# Patient Record
Sex: FEMALE | Race: WHITE | Employment: OTHER | URBAN - NONMETROPOLITAN AREA
[De-identification: names, ages, dates, MRNs, and addresses within clinical notes are randomized per-mention and may not be internally consistent; named-entity substitution may affect disease eponyms.]

---

## 2019-12-17 ENCOUNTER — OFFICE VISIT (OUTPATIENT)
Dept: FAMILY MEDICINE CLINIC | Age: 69
End: 2019-12-17
Payer: MEDICARE

## 2019-12-17 VITALS
SYSTOLIC BLOOD PRESSURE: 110 MMHG | DIASTOLIC BLOOD PRESSURE: 66 MMHG | HEART RATE: 62 BPM | WEIGHT: 159 LBS | RESPIRATION RATE: 12 BRPM | HEIGHT: 64 IN | TEMPERATURE: 98 F | BODY MASS INDEX: 27.14 KG/M2

## 2019-12-17 DIAGNOSIS — Z12.11 COLON CANCER SCREENING: ICD-10-CM

## 2019-12-17 DIAGNOSIS — E78.00 PURE HYPERCHOLESTEROLEMIA: ICD-10-CM

## 2019-12-17 DIAGNOSIS — Z12.31 ENCOUNTER FOR SCREENING MAMMOGRAM FOR MALIGNANT NEOPLASM OF BREAST: ICD-10-CM

## 2019-12-17 DIAGNOSIS — I69.30 LATE EFFECT OF CEREBROVASCULAR ACCIDENT (CVA): Primary | ICD-10-CM

## 2019-12-17 PROCEDURE — 99202 OFFICE O/P NEW SF 15 MIN: CPT | Performed by: FAMILY MEDICINE

## 2019-12-17 RX ORDER — VITAMIN B COMPLEX
100 TABLET ORAL DAILY
COMMUNITY

## 2019-12-17 RX ORDER — ECHINACEA 400 MG
1000 CAPSULE ORAL DAILY
COMMUNITY

## 2019-12-17 RX ORDER — NEOMYCIN/POLYMYXIN B/PRAMOXINE 3.5-10K-1
500 CREAM (GRAM) TOPICAL DAILY
COMMUNITY
End: 2020-02-19

## 2019-12-17 ASSESSMENT — PATIENT HEALTH QUESTIONNAIRE - PHQ9
2. FEELING DOWN, DEPRESSED OR HOPELESS: 0
SUM OF ALL RESPONSES TO PHQ QUESTIONS 1-9: 0
SUM OF ALL RESPONSES TO PHQ QUESTIONS 1-9: 0
SUM OF ALL RESPONSES TO PHQ9 QUESTIONS 1 & 2: 0
1. LITTLE INTEREST OR PLEASURE IN DOING THINGS: 0

## 2020-02-17 ENCOUNTER — TELEPHONE (OUTPATIENT)
Dept: FAMILY MEDICINE CLINIC | Age: 70
End: 2020-02-17

## 2020-02-19 ENCOUNTER — APPOINTMENT (OUTPATIENT)
Dept: CT IMAGING | Age: 70
DRG: 065 | End: 2020-02-19
Payer: MEDICARE

## 2020-02-19 ENCOUNTER — HOSPITAL ENCOUNTER (INPATIENT)
Age: 70
LOS: 5 days | Discharge: HOME HEALTH | DRG: 065 | End: 2020-02-24
Attending: EMERGENCY MEDICINE | Admitting: INTERNAL MEDICINE
Payer: MEDICARE

## 2020-02-19 ENCOUNTER — APPOINTMENT (OUTPATIENT)
Dept: GENERAL RADIOLOGY | Age: 70
DRG: 065 | End: 2020-02-19
Payer: MEDICARE

## 2020-02-19 ENCOUNTER — APPOINTMENT (OUTPATIENT)
Dept: MRI IMAGING | Age: 70
DRG: 065 | End: 2020-02-19
Payer: MEDICARE

## 2020-02-19 PROBLEM — I10 ESSENTIAL HYPERTENSION: Status: ACTIVE | Noted: 2020-02-19

## 2020-02-19 PROBLEM — R47.01 APHASIA: Status: ACTIVE | Noted: 2020-02-19

## 2020-02-19 LAB
ALBUMIN SERPL-MCNC: 4.2 G/DL (ref 3.5–5.1)
ALP BLD-CCNC: 72 U/L (ref 38–126)
ALT SERPL-CCNC: 8 U/L (ref 11–66)
ANION GAP SERPL CALCULATED.3IONS-SCNC: 16 MEQ/L (ref 8–16)
APTT: 38.7 SECONDS (ref 22–38)
AST SERPL-CCNC: 14 U/L (ref 5–40)
AVERAGE GLUCOSE: 90 MG/DL (ref 70–126)
BASOPHILS # BLD: 0.7 %
BASOPHILS ABSOLUTE: 0 THOU/MM3 (ref 0–0.1)
BILIRUB SERPL-MCNC: 0.5 MG/DL (ref 0.3–1.2)
BILIRUBIN URINE: NEGATIVE
BLOOD, URINE: NEGATIVE
BUN BLDV-MCNC: 11 MG/DL (ref 7–22)
CALCIUM SERPL-MCNC: 9.1 MG/DL (ref 8.5–10.5)
CHARACTER, URINE: CLEAR
CHLORIDE BLD-SCNC: 105 MEQ/L (ref 98–111)
CHOLESTEROL, TOTAL: 179 MG/DL (ref 100–199)
CO2: 23 MEQ/L (ref 23–33)
COLOR: YELLOW
CREAT SERPL-MCNC: 0.9 MG/DL (ref 0.4–1.2)
EKG ATRIAL RATE: 51 BPM
EKG P AXIS: 74 DEGREES
EKG P-R INTERVAL: 172 MS
EKG Q-T INTERVAL: 432 MS
EKG QRS DURATION: 94 MS
EKG QTC CALCULATION (BAZETT): 398 MS
EKG R AXIS: -16 DEGREES
EKG T AXIS: -5 DEGREES
EKG VENTRICULAR RATE: 51 BPM
EOSINOPHIL # BLD: 0.5 %
EOSINOPHILS ABSOLUTE: 0 THOU/MM3 (ref 0–0.4)
ERYTHROCYTE [DISTWIDTH] IN BLOOD BY AUTOMATED COUNT: 14.8 % (ref 11.5–14.5)
ERYTHROCYTE [DISTWIDTH] IN BLOOD BY AUTOMATED COUNT: 44.8 FL (ref 35–45)
ETHYL ALCOHOL, SERUM: < 0.01 %
FOLATE: 13.9 NG/ML (ref 4.8–24.2)
GFR SERPL CREATININE-BSD FRML MDRD: 62 ML/MIN/1.73M2
GLUCOSE BLD-MCNC: 100 MG/DL (ref 70–108)
GLUCOSE BLD-MCNC: 88 MG/DL (ref 70–108)
GLUCOSE, URINE: NEGATIVE MG/DL
HBA1C MFR BLD: 5 % (ref 4.4–6.4)
HCT VFR BLD CALC: 38.8 % (ref 37–47)
HDLC SERPL-MCNC: 44 MG/DL
HEMOGLOBIN: 12.6 GM/DL (ref 12–16)
IMMATURE GRANS (ABS): 0.01 THOU/MM3 (ref 0–0.07)
IMMATURE GRANULOCYTES: 0.2 %
INR BLD: 1.09 (ref 0.85–1.13)
KETONES, URINE: NEGATIVE
LDL CHOLESTEROL CALCULATED: 105 MG/DL
LEUKOCYTES, UA: ABNORMAL
LYMPHOCYTES # BLD: 21.9 %
LYMPHOCYTES ABSOLUTE: 1 THOU/MM3 (ref 1–4.8)
MCH RBC QN AUTO: 27.3 PG (ref 26–33)
MCHC RBC AUTO-ENTMCNC: 32.5 GM/DL (ref 32.2–35.5)
MCV RBC AUTO: 84.2 FL (ref 81–99)
MONOCYTES # BLD: 7.2 %
MONOCYTES ABSOLUTE: 0.3 THOU/MM3 (ref 0.4–1.3)
NITRATE, UA: NEGATIVE
NUCLEATED RED BLOOD CELLS: 0 /100 WBC
OSMOLALITY CALCULATION: 286.3 MOSMOL/KG (ref 275–300)
PH UA: 5 (ref 5–9)
PLATELET # BLD: 137 THOU/MM3 (ref 130–400)
PMV BLD AUTO: 11.4 FL (ref 9.4–12.4)
POTASSIUM REFLEX MAGNESIUM: 4.1 MEQ/L (ref 3.5–5.2)
PROTEIN UA: NEGATIVE MG/DL
RBC # BLD: 4.61 MILL/MM3 (ref 4.2–5.4)
REFLEX TO URINE C & S: ABNORMAL
SEG NEUTROPHILS: 69.5 %
SEGMENTED NEUTROPHILS ABSOLUTE COUNT: 3.1 THOU/MM3 (ref 1.8–7.7)
SODIUM BLD-SCNC: 144 MEQ/L (ref 135–145)
SPECIFIC GRAVITY UA: 1.01 (ref 1–1.03)
T4 FREE: 1.11 NG/DL (ref 0.93–1.76)
TOTAL PROTEIN: 6.9 G/DL (ref 6.1–8)
TRIGL SERPL-MCNC: 149 MG/DL (ref 0–199)
TROPONIN T: < 0.01 NG/ML
TSH SERPL DL<=0.05 MIU/L-ACNC: 6.66 UIU/ML (ref 0.4–4.2)
UROBILINOGEN, URINE: 0.2 EU/DL (ref 0–1)
VITAMIN B-12: 180 PG/ML (ref 211–911)
WBC # BLD: 4.4 THOU/MM3 (ref 4.8–10.8)

## 2020-02-19 PROCEDURE — 83036 HEMOGLOBIN GLYCOSYLATED A1C: CPT

## 2020-02-19 PROCEDURE — 99223 1ST HOSP IP/OBS HIGH 75: CPT | Performed by: INTERNAL MEDICINE

## 2020-02-19 PROCEDURE — 2580000003 HC RX 258: Performed by: INTERNAL MEDICINE

## 2020-02-19 PROCEDURE — 99205 OFFICE O/P NEW HI 60 MIN: CPT

## 2020-02-19 PROCEDURE — 70553 MRI BRAIN STEM W/O & W/DYE: CPT

## 2020-02-19 PROCEDURE — 84439 ASSAY OF FREE THYROXINE: CPT

## 2020-02-19 PROCEDURE — A9579 GAD-BASE MR CONTRAST NOS,1ML: HCPCS | Performed by: INTERNAL MEDICINE

## 2020-02-19 PROCEDURE — 70450 CT HEAD/BRAIN W/O DYE: CPT

## 2020-02-19 PROCEDURE — 84443 ASSAY THYROID STIM HORMONE: CPT

## 2020-02-19 PROCEDURE — 81003 URINALYSIS AUTO W/O SCOPE: CPT

## 2020-02-19 PROCEDURE — 36415 COLL VENOUS BLD VENIPUNCTURE: CPT

## 2020-02-19 PROCEDURE — 99284 EMERGENCY DEPT VISIT MOD MDM: CPT

## 2020-02-19 PROCEDURE — 80053 COMPREHEN METABOLIC PANEL: CPT

## 2020-02-19 PROCEDURE — 82746 ASSAY OF FOLIC ACID SERUM: CPT

## 2020-02-19 PROCEDURE — G0480 DRUG TEST DEF 1-7 CLASSES: HCPCS

## 2020-02-19 PROCEDURE — 2709999900 HC NON-CHARGEABLE SUPPLY

## 2020-02-19 PROCEDURE — 85610 PROTHROMBIN TIME: CPT

## 2020-02-19 PROCEDURE — 82948 REAGENT STRIP/BLOOD GLUCOSE: CPT

## 2020-02-19 PROCEDURE — 84484 ASSAY OF TROPONIN QUANT: CPT

## 2020-02-19 PROCEDURE — 82607 VITAMIN B-12: CPT

## 2020-02-19 PROCEDURE — 99222 1ST HOSP IP/OBS MODERATE 55: CPT | Performed by: PSYCHIATRY & NEUROLOGY

## 2020-02-19 PROCEDURE — 6370000000 HC RX 637 (ALT 250 FOR IP): Performed by: INTERNAL MEDICINE

## 2020-02-19 PROCEDURE — 2580000003 HC RX 258: Performed by: EMERGENCY MEDICINE

## 2020-02-19 PROCEDURE — 2060000000 HC ICU INTERMEDIATE R&B

## 2020-02-19 PROCEDURE — 85025 COMPLETE CBC W/AUTO DIFF WBC: CPT

## 2020-02-19 PROCEDURE — 6370000000 HC RX 637 (ALT 250 FOR IP): Performed by: EMERGENCY MEDICINE

## 2020-02-19 PROCEDURE — 85730 THROMBOPLASTIN TIME PARTIAL: CPT

## 2020-02-19 PROCEDURE — 80061 LIPID PANEL: CPT

## 2020-02-19 PROCEDURE — 6360000004 HC RX CONTRAST MEDICATION: Performed by: INTERNAL MEDICINE

## 2020-02-19 PROCEDURE — 93005 ELECTROCARDIOGRAM TRACING: CPT | Performed by: EMERGENCY MEDICINE

## 2020-02-19 PROCEDURE — 70496 CT ANGIOGRAPHY HEAD: CPT

## 2020-02-19 PROCEDURE — 70498 CT ANGIOGRAPHY NECK: CPT

## 2020-02-19 PROCEDURE — 71045 X-RAY EXAM CHEST 1 VIEW: CPT

## 2020-02-19 RX ORDER — ACETAMINOPHEN 325 MG/1
650 TABLET ORAL EVERY 6 HOURS PRN
Status: DISCONTINUED | OUTPATIENT
Start: 2020-02-19 | End: 2020-02-24 | Stop reason: HOSPADM

## 2020-02-19 RX ORDER — CLOPIDOGREL BISULFATE 75 MG/1
75 TABLET ORAL DAILY
Status: DISCONTINUED | OUTPATIENT
Start: 2020-02-19 | End: 2020-02-24 | Stop reason: HOSPADM

## 2020-02-19 RX ORDER — ASPIRIN 81 MG/1
81 TABLET, CHEWABLE ORAL DAILY
Status: DISCONTINUED | OUTPATIENT
Start: 2020-02-19 | End: 2020-02-24 | Stop reason: HOSPADM

## 2020-02-19 RX ORDER — ATORVASTATIN CALCIUM 40 MG/1
40 TABLET, FILM COATED ORAL NIGHTLY
Status: DISCONTINUED | OUTPATIENT
Start: 2020-02-19 | End: 2020-02-24 | Stop reason: HOSPADM

## 2020-02-19 RX ORDER — ASPIRIN 81 MG/1
324 TABLET, CHEWABLE ORAL ONCE
Status: COMPLETED | OUTPATIENT
Start: 2020-02-19 | End: 2020-02-19

## 2020-02-19 RX ORDER — ACETAMINOPHEN 650 MG/1
650 SUPPOSITORY RECTAL EVERY 6 HOURS PRN
Status: DISCONTINUED | OUTPATIENT
Start: 2020-02-19 | End: 2020-02-24 | Stop reason: HOSPADM

## 2020-02-19 RX ORDER — SODIUM CHLORIDE 0.9 % (FLUSH) 0.9 %
10 SYRINGE (ML) INJECTION PRN
Status: DISCONTINUED | OUTPATIENT
Start: 2020-02-19 | End: 2020-02-24 | Stop reason: HOSPADM

## 2020-02-19 RX ORDER — DEXTROSE AND SODIUM CHLORIDE 5; .9 G/100ML; G/100ML
INJECTION, SOLUTION INTRAVENOUS CONTINUOUS
Status: DISCONTINUED | OUTPATIENT
Start: 2020-02-19 | End: 2020-02-19

## 2020-02-19 RX ORDER — SODIUM CHLORIDE 9 MG/ML
1000 INJECTION, SOLUTION INTRAVENOUS CONTINUOUS
Status: DISCONTINUED | OUTPATIENT
Start: 2020-02-19 | End: 2020-02-19

## 2020-02-19 RX ORDER — AMLODIPINE BESYLATE 5 MG/1
5 TABLET ORAL DAILY
Status: DISCONTINUED | OUTPATIENT
Start: 2020-02-19 | End: 2020-02-21

## 2020-02-19 RX ORDER — SODIUM CHLORIDE 0.9 % (FLUSH) 0.9 %
10 SYRINGE (ML) INJECTION EVERY 12 HOURS SCHEDULED
Status: DISCONTINUED | OUTPATIENT
Start: 2020-02-19 | End: 2020-02-24 | Stop reason: HOSPADM

## 2020-02-19 RX ORDER — ONDANSETRON 2 MG/ML
4 INJECTION INTRAMUSCULAR; INTRAVENOUS EVERY 6 HOURS PRN
Status: DISCONTINUED | OUTPATIENT
Start: 2020-02-19 | End: 2020-02-24 | Stop reason: HOSPADM

## 2020-02-19 RX ORDER — POLYETHYLENE GLYCOL 3350 17 G/17G
17 POWDER, FOR SOLUTION ORAL DAILY PRN
Status: DISCONTINUED | OUTPATIENT
Start: 2020-02-19 | End: 2020-02-24 | Stop reason: HOSPADM

## 2020-02-19 RX ORDER — PROMETHAZINE HYDROCHLORIDE 25 MG/1
12.5 TABLET ORAL EVERY 6 HOURS PRN
Status: DISCONTINUED | OUTPATIENT
Start: 2020-02-19 | End: 2020-02-24 | Stop reason: HOSPADM

## 2020-02-19 RX ADMIN — IOPAMIDOL 80 ML: 755 INJECTION, SOLUTION INTRAVENOUS at 17:33

## 2020-02-19 RX ADMIN — AMLODIPINE BESYLATE 5 MG: 5 TABLET ORAL at 23:18

## 2020-02-19 RX ADMIN — SODIUM CHLORIDE 1000 ML: 9 INJECTION, SOLUTION INTRAVENOUS at 14:38

## 2020-02-19 RX ADMIN — ASPIRIN 81 MG 324 MG: 81 TABLET ORAL at 14:39

## 2020-02-19 RX ADMIN — GADOTERIDOL 10 ML: 279.3 INJECTION, SOLUTION INTRAVENOUS at 21:42

## 2020-02-19 RX ADMIN — CLOPIDOGREL 75 MG: 75 TABLET, FILM COATED ORAL at 20:19

## 2020-02-19 RX ADMIN — Medication 10 ML: at 20:17

## 2020-02-19 RX ADMIN — ATORVASTATIN CALCIUM 40 MG: 40 TABLET, FILM COATED ORAL at 23:18

## 2020-02-19 RX ADMIN — ASPIRIN 81 MG 81 MG: 81 TABLET ORAL at 20:19

## 2020-02-19 ASSESSMENT — ENCOUNTER SYMPTOMS
STRIDOR: 0
BACK PAIN: 0
NAUSEA: 0
DIARRHEA: 0
ALLERGIC/IMMUNOLOGIC NEGATIVE: 1
EYE PAIN: 0
VOMITING: 0
COLOR CHANGE: 0
EYE DISCHARGE: 0
ABDOMINAL PAIN: 0
PHOTOPHOBIA: 0
WHEEZING: 0
BLOOD IN STOOL: 0
SINUS PRESSURE: 0
VOICE CHANGE: 0
EYE ITCHING: 0
SHORTNESS OF BREATH: 0
RHINORRHEA: 0
SORE THROAT: 0
CONSTIPATION: 0
CHEST TIGHTNESS: 0
COUGH: 0
EYE REDNESS: 0
TROUBLE SWALLOWING: 0

## 2020-02-19 ASSESSMENT — PAIN SCALES - GENERAL: PAINLEVEL_OUTOF10: 0

## 2020-02-19 NOTE — ED TRIAGE NOTES
Pt presents to [facility] via EMS from urgent care c/o altered mental status and expressive aphasia since Monday. Apparently on Monday pt was going a short distance from her house and got lost which was concerning for family. Pt noted to answer some questions but has trouble with dates. Per EMS .  at bedside. EKG completed and pt placed on monitor. Dr. Kusum Puckett updated on pt situation.

## 2020-02-19 NOTE — ED NOTES
Patient resting quietly in cot showing no signs of distress at this time. Denies any pain. Family at bedside. Updated on POC. Will continue to monitor.      Guillermina Castaneda RN  02/19/20 1770

## 2020-02-19 NOTE — ED PROVIDER NOTES
midline. No pharyngeal swelling, oropharyngeal exudate, posterior oropharyngeal erythema or uvula swelling. Tonsils: No tonsillar exudate or tonsillar abscesses. Swellin+ on the right. 2+ on the left. Eyes:      General: Lids are normal. Gaze aligned appropriately. Right eye: No discharge. Left eye: No discharge. Extraocular Movements: Extraocular movements intact. Right eye: No nystagmus. Left eye: No nystagmus. Conjunctiva/sclera: Conjunctivae normal.      Right eye: Right conjunctiva is not injected. Left eye: Left conjunctiva is not injected. Pupils: Pupils are equal, round, and reactive to light. Pupils are equal.      Right eye: Pupil is round, reactive and not sluggish. Left eye: Pupil is round, reactive and not sluggish. Neck:      Musculoskeletal: Normal range of motion. Cardiovascular:      Rate and Rhythm: Normal rate and regular rhythm. Pulses: Normal pulses. Radial pulses are 2+ on the right side and 2+ on the left side. Pulmonary:      Effort: Pulmonary effort is normal. No respiratory distress. Breath sounds: Normal breath sounds and air entry. No stridor, decreased air movement or transmitted upper airway sounds. No decreased breath sounds, wheezing, rhonchi or rales. Musculoskeletal:      Right knee: She exhibits normal range of motion. Left knee: She exhibits normal range of motion. Skin:     General: Skin is warm and dry. Capillary Refill: Capillary refill takes less than 2 seconds. Coloration: Skin is not pale. Findings: No bruising or rash. Comments: No obvious signs of infection or trauma. Neurological:      Mental Status: She is alert and oriented to person, place, and time. GCS: GCS eye subscore is 4. GCS verbal subscore is 4. GCS motor subscore is 6. Cranial Nerves: Facial asymmetry (slight right facial droop) present. Sensory: Sensation is intact.  No sensory deficit. Motor: Motor function is intact. Coordination: Coordination normal.      Gait: Gait normal.      Comments: Equal and strong hand grasp, pedal push and pull  No ataxia , had pt walk down hallway, had standby assist.  Pt turned corner and needed redirected to where she was going   Psychiatric:         Behavior: Behavior normal. Behavior is cooperative. DIAGNOSTIC RESULTS   Labs:  Results for orders placed or performed during the hospital encounter of 02/19/20   UA without Microscopic Reflex C&S   Result Value Ref Range    Glucose, Urine Negative NEGATIVE mg/dl    Bilirubin Urine Negative NEGATIVE    Ketones, Urine Negative NEGATIVE    Specific Gravity, UA 1.010 1.002 - 1.03    Blood, Urine Negative NEGATIVE    pH, UA 5.00 5.0 - 9.0    Protein, UA Negative NEGATIVE mg/dl    Urobilinogen, Urine 0.20 0.0 - 1.0 eu/dl    Nitrate, UA Negative NEGATIVE    LEUKOCYTES, UA Trace (A) NEGATIVE    Color, UA Yellow STRAW-YELL    Character, Urine Clear CLEAR-SL C    REFLEX TO URINE C & S NOT INDICATED    POCT glucose   Result Value Ref Range    POC Glucose 88 70 - 108 mg/dl       IMAGING:  No orders to display     URGENT CARE COURSE:     Vitals:    02/19/20 1311 02/19/20 1339   BP:  (!) 205/90   Pulse: 60    Resp: 16    Temp: 98.1 °F (36.7 °C)    TempSrc: Oral    SpO2: 99%    Weight: 159 lb 12.8 oz (72.5 kg)    Height: 5' 3.5\" (1.613 m)        Medications   dextrose 5 % and 0.9 % sodium chloride infusion (has no administration in time range)     PROCEDURES:  FINALIMPRESSION      1. Altered mental status, unspecified altered mental status type    2. Expressive aphasia    3. Late effect of cerebrovascular accident (CVA)    4.  Ascending aortic aneurysm (HCC)    5. Elevated blood pressure reading        DISPOSITION/PLAN   DISPOSITION Decision To Transfer 02/19/2020 02:07:11 PM    Send ua for culture, trace leuks and change in mental status  Left via [facility], report given, nursing unsuccessful with IV, EMT will

## 2020-02-19 NOTE — H&P
History & Physical        Patient:  Edward Lopez  YOB: 1950    MRN: [id number]     Acct: [de-identified]    PCP: Sravanthi Ladd DO    Date of Admission: 2/19/2020    Date of Service: Pt seen/examined on 2/19/2020   and Admitted to Inpatient with expected LOS greater than two midnights due to medical therapy. Chief Complaint:  Confusion, difficulty speaking       History Of Present Illness:     81 y.o. female who presented to [address] [place] [address] with confusion and difficulty speaking. Pt has a pmh of prior CVA in [address], since then has never followed up with a doctor and has not been taking any medications. 2 days ago pt drove to get [employer], however couldn't remember how to get home shortly after. Owner of [employer] place had to call her  to come pick her up. Pt has also been having episodes of difficulty speaking over the past few days. Denies any facial droop, extremity weakness or numbness. Pt was seen by a family physician on 11/2019 of which she refused to take an medications. Pt takes Flax seed oil at home only. In the ED, /90. Troponin non elevated. No ECG changes of ischemia. CT head showing moderate size area of hypodensity left temporal lobe concerning for acute/subacute infarct. Will admit for CVA work-up. Past Medical History:          Diagnosis Date    Stroke [facility]) 11/1/14    Unspecified cerebral artery occlusion with cerebral infarction        Past Surgical History:      No past surgical history on file. Medications Prior to Admission:      Prior to Admission medications    Medication Sig Start Date End Date Taking?  Authorizing Provider   Omega-3 Krill Oil 500 MG CAPS Take 500 mg by mouth daily    Historical Provider, MD   Coenzyme Q10 (COQ10) 100 MG CAPS Take 100 mg by mouth daily    Historical Provider, MD   Flaxseed, Linseed, (FLAXSEED OIL) 1000 MG CAPS Take 1,000 mg by mouth daily    Historical Provider, MD Mack N Juan Diego MENDOSA ADVANCED PO) Take 1,200 mg by mouth daily    Historical Provider, MD   aspirin 81 MG tablet Take 81 mg by mouth daily    Historical Provider, MD   Handicap Placard MISC by Does not apply route Expires 12/17/23 12/17/19   Baljeet Brooks DO   acetaminophen 650 MG TABS Take 650 mg by mouth every 4 hours as needed. 11/10/14   Lucy Anaya MD   folic acid (FOLVITE) 1 MG tablet Take 1 tablet by mouth daily. Patient taking differently: Take 800 mcg by mouth daily  11/10/14   Lucy Anaya MD   Multiple Vitamins-Minerals (THERAPEUTIC MULTIVITAMIN-MINERALS) tablet Take 1 tablet by mouth daily. 11/10/14   Lucy Anaya MD   vitamin B-1 100 MG tablet Take 1 tablet by mouth daily. Patient taking differently: Take 250 mg by mouth daily  11/10/14   Lucy Anaya MD       Allergies:  Patient has no known allergies.     Social History:     Social History     Socioeconomic History    Marital status:      Spouse name: Jordon Camacho Number of children: 1    Years of education: Not on file    Highest education level: Not on file   Occupational History    Not on file   Social Needs    Financial resource strain: Not on file    Food insecurity:     Worry: Not on file     Inability: Not on file   [employer] needs:     Medical: Not on file     Non-medical: Not on file   Tobacco Use    Smoking status: Never Smoker    Smokeless tobacco: Never Used   Substance and Sexual Activity    Alcohol use: Yes     Comment: 4 Bud lights 5 days a week    Drug use: No    Sexual activity: Not on file   Lifestyle    Physical activity:     Days per week: Not on file     Minutes per session: Not on file    Stress: Not on file   Relationships    Social connections:     Talks on phone: Not on file     Gets together: Not on file     Attends [employer] service: Not on file     Active member of club or organization: Not on file     Attends meetings of clubs or organizations: Not on file     Relationship status: Not on file    Intimate

## 2020-02-19 NOTE — ED NOTES
Patient resting quietly in cot showing no signs of distress at this time. Denies any pain. Updated on POC. Will continue to monitor.       Akosua Rhoades, JASON  02/19/20 6547

## 2020-02-19 NOTE — ED NOTES
ED to inpatient nurses report    Chief Complaint   Patient presents with    Other     occasionaly aphasia      Present to ED from home  LOC: alert and orientated to name, place, date  Vital signs   Vitals:    02/19/20 1410 02/19/20 1440 02/19/20 1532 02/19/20 1656   BP: (!) 193/72 (!) 196/74 (!) 181/71 (!) 173/74   Pulse: 53 55 52 55   Resp: 16 16 16 16   Temp: 98.3 °F (36.8 °C)      TempSrc:       SpO2: 100% 99% 100% 100%   Weight:       Height:          Oxygen Baseline roomair    Current needs required none at this time   Bipap/Cpap No  LDAs:   Peripheral IV 02/19/20 Left Antecubital (Active)   Site Assessment Clean;Dry; Intact 2/19/2020  4:56 PM   Line Status Normal saline locked 2/19/2020  4:56 PM   Dressing Status Clean;Dry; Intact 2/19/2020  4:56 PM     Mobility: Requires assistance * 1  Pending ED orders: none at this time  Present condition: stable    Electronically signed by Derrell Thomason RN on 2/19/2020 at 5:38 PM       Derrell Thomason RN  02/19/20 5032

## 2020-02-19 NOTE — ED TRIAGE NOTES
Patient to room with family member. He states patient got lost Monday abut 1900 and has not been same since. Patient ambulated to room with smooth steady gait. Alert to person, unable to answer date, day of week or name of president. Chem assessed at 80. Vitals assessed. Patient able to smile with slight drop on right mouth. Hand  equal. Patient denies dizziness, headache, changes in vision, fall or injury, chest pain or shortness of breath. Patient also states she has had urine frequency and burning after being asked but unable to state back to provider.

## 2020-02-19 NOTE — CONSULTS
smoker--COPD    Stroke Sister 64       Review of Systems:  All systems reviewed and are all negative except what is mentioned in history of present illness. Physical Exam:  BP (!) 173/74   Pulse 55   Temp 98.3 °F (36.8 °C)   Resp 16   Ht 5' 3.5\" (1.613 m)   Wt 159 lb 12.8 oz (72.5 kg)   SpO2 100%   BMI 27.86 kg/m²  I Body mass index is 27.86 kg/m². I   Wt Readings from Last 1 Encounters:   02/19/20 159 lb 12.8 oz (72.5 kg)           General:  pleasant in no acute distress. HEENT: No pallor or icterus. Neck supple. No Carotid bruits. Heart: S1 and S2 normal with regular rhythm. No murmur. GENERAL NEUROLOGIC EXAM     Mental Status: Awake alert and oriented to person place and time. Language is moderately abnormal for comprehension, repetition, naming and fluency. Recent and remote memory were abnormal.  Attention span and concentration were abnormal. Fund of knowledge was abnormal.      Cranial nerves:  Fundi were normal bilaterally. Visual fields right homonymous  hemianopsia. Pupils are equal regular and reactive to light. Extraocular movements are intact. Facial sensation was normal and symmetrical bilaterally. Left facial asymmetry. Hearing to finger rub normal bilaterally. Palate elevates symmetrically. Sternocleidomastoid and trapezius normal. The tongue protrudes midline without atrophy or fasciculations. Motor: Normal tone and bulk with no involuntary movements or pronator drift. Strength 4/5 in right upper and lower extremities. Sensation: Light touch, pin prick, vibration and joint position sense were normal in the upper and lower extremities. Reflexes: 1+ bilaterally symmetrical with down going toes. Coordination: Deferred due to weakness.      Gait: Deferred due to weakness          Labs:    CBC:   Recent Labs     02/19/20  1509   WBC 4.4*   HGB 12.6      MCV 84.2   MCH 27.3   MCHC 32.5     CMP:  Recent Labs     02/19/20  1509      K 4.1      CO2 23   BUN 11   CREATININE 0.9   LABGLOM 62*   GLUCOSE 100   CALCIUM 9.1     Liver:   Recent Labs     02/19/20  1509   AST 14   ALT 8*   ALKPHOS 72   PROT 6.9   LABALBU 4.2   BILITOT 0.5     Stroke Labs: No results for input(s): [id number], TSH, LDLCALC, LABA1C in the last 72 hours. Imaging:  No results found for this or any previous visit. No results found for this or any previous visit. No results found for this or any previous visit. No results found for this or any previous visit. No results found for this or any previous visit. No results found for this or any previous visit. No results found for this or any previous visit. Results for orders placed during the hospital encounter of 02/19/20   CT Head WO Contrast    Narrative PROCEDURE: CT HEAD WO CONTRAST    CLINICAL INFORMATION: expressive aphasia X 3 days. COMPARISON: MR brain dated 11/5/2014. There are moderate sized area as evidence    TECHNIQUE: Noncontrast 5 mm axial images were obtained through the brain. Sagittal and coronal reconstructions were obtained. All CT scans at this facility use dose modulation, iterative reconstruction, and/or weight-based dosing when appropriate to reduce radiation dose to as low as reasonably achievable. FINDINGS:  There are moderate-sized areas of encephalomalacia in the left parietal lobe and left occipital lobe as well as the left frontal lobe. Left frontal and parietal areas of encephalomalacia are stable compared to 2014. The area of occipital lobe   encephalomalacia has occurred in the interval since 2014. In addition, there is a moderate-sized area of hypodensity in the posterior left temporal lobe extending into the occipital lobe with mild mass effect on the occipital horn of the left lateral   ventricle. No intracranial hemorrhage is present. No midline shift is present. The calvarium appears intact. Orbits are unremarkable. Visualized paranasal sinuses are clear. Mastoid air cells are clear. Impression    1. Moderate-sized area of hypodensity in the posterior left temporal lobe extending into the occipital lobe is concerning for acute/early subacute infarct given the patient's symptoms. A neoplasm is considered less likely. Recommend MRI of the brain with   contrast for further evaluation. 2. Focal areas of encephalomalacia in the left frontal lobe, parietal lobe and occipital lobe as evidence for old infarct. Some of these are stable compared to 2014. **This report has been created using voice recognition software. It may contain minor errors which are inherent in voice recognition technology. **    Final report electronically signed by Dr. Brandyn Orellana MD on 2/19/2020 3:13 PM       We reviewed the patient records and available information in the EHR     Principal Problem:    Acute CVA (cerebrovascular accident) [facility])  Active Problems:    Essential hypertension    Aphasia  Resolved Problems:    * No resolved hospital problems. *      ASSESSMENT/PLAN: This is a 81 y.o. female who  has a past medical history of Stroke ([address] and Unspecified cerebral artery occlusion with cerebral infarction. She presents with right-sided weakness and loss of vision in the right vision field since Sunday. Patient had previous CVA but noncompliant on medications. CT head shows evolving subacute left temporal lobe ischemic CVA. CTA head and neck shows left ICA around 50% stenosis with unstable plaque. Most importantly left MCA M1 segment occlusion versus near occlusion. When compared to previous CT angiogram in 2014 M1 occlusion/critical stenosis is new. 1.  Subacute left MCA ischemic CVA secondary to MCA occlusion/critical stenosis from noncompliance of medications and risk factors:    -Permissive hypertension keep the blood pressure between 140-180 for 48 hours. - Stroke labs; B12, TSH, lipid and A1c.  - urinalysis.   - STAT CTA HEAD AND NECK reviewed  - MRI brain with out contrast to rule out

## 2020-02-19 NOTE — ED PROVIDER NOTES
postnasal drip, sinus pressure, sore throat, trouble swallowing and voice change. Eyes: Negative for photophobia, pain and discharge. Respiratory: Negative for cough, chest tightness, shortness of breath and wheezing. Cardiovascular: Negative for chest pain and palpitations. Gastrointestinal: Negative for abdominal pain, blood in stool, constipation, diarrhea, nausea and vomiting. Genitourinary: Positive for frequency. Negative for difficulty urinating, dysuria, flank pain, hematuria and urgency. Musculoskeletal: Negative for arthralgias and myalgias. Skin: Negative for color change, pallor and rash. Allergic/Immunologic: Negative for immunocompromised state. Neurological: Positive for speech difficulty (worsening expressive aphasia) and numbness (left leg). Negative for dizziness, syncope, weakness, light-headedness and headaches. Hematological: Negative for adenopathy. Psychiatric/Behavioral: Positive for confusion. All other systems reviewed and are negative. PAST MEDICAL HISTORY    has a past medical history of Stroke [facility]) and Unspecified cerebral artery occlusion with cerebral infarction. SURGICAL HISTORY      has no past surgical history on file. CURRENT MEDICATIONS       Current Discharge Medication List      CONTINUE these medications which have NOT CHANGED    Details   Coenzyme Q10 (COQ10) 100 MG CAPS Take 100 mg by mouth daily      Flaxseed, Linseed, (FLAXSEED OIL) 1000 MG CAPS Take 1,000 mg by mouth daily      aspirin 81 MG tablet Take 81 mg by mouth daily      Multiple Vitamins-Minerals (THERAPEUTIC MULTIVITAMIN-MINERALS) tablet Take 1 tablet by mouth daily. Qty: 90 tablet, Refills: 1      vitamin B-1 100 MG tablet Take 1 tablet by mouth daily.   Qty: 30 tablet, Refills: 3      Misc Natural Products (TART CHERRY ADVANCED PO) Take 1,200 mg by mouth daily      Handicap Afshanard MISC by Does not apply route Expires 12/17/23  Qty: 1 each, Refills: 0      acetaminophen 650 MG TABS Take 650 mg by mouth every 4 hours as needed. Qty: 120 tablet, Refills: 3             ALLERGIES     has No Known Allergies. FAMILY HISTORY     She indicated that her mother is . She indicated that her father is . She indicated that her sister is alive. family history includes Other in her father and mother; Stroke (age of onset: 62) in her sister. SOCIAL HISTORY      reports that she has never smoked. She has never used smokeless tobacco. She reports current alcohol use. She reports that she does not use drugs. PHYSICAL EXAM     INITIAL VITALS:  height is 5' 2.99\" (1.6 m) and weight is 148 lb 9.4 oz (67.4 kg). Her oral temperature is 98.2 °F (36.8 °C). Her blood pressure is 160/80 (abnormal) and her pulse is 56. Her respiration is 16 and oxygen saturation is 100%. CONSTITUTIONAL: [Awake, alert, non toxic, well developed, well nourished, no acute distress]  HEAD: [Normocephalic, atraumatic]  EYES: [Pupils equal, round & reactive to light, extraocular movements intact, no nystagmus, clear conjunctiva, non-icteric sclera]  ENT: [External ear canal clear without evidence of cerumen impaction or foreign body, TM's clear without erythema or bulging. Nares patent without drainage, septum appears midline. Moist mucus membranes, oropharynx clear without exudate, erythema, or mass. Uvula midline]  NECK: [Nontender and supple. No meningismus, no appreciated lymphadenopathy. Intact full range of motion. C-spine midline without vertebral tenderness. Trachea midline.]  CHEST: [Inspection normal, no lesions, equal rise. No crepitus or tenderness upon palpation.]  CARDIOVASCULAR: [Regular rate, rhythm, normal S1 and S2. No appreciated murmurs, rubs, or gallops. No pulse deficits appreciated. Intact distal perfusion. JVD not appreciated.]  PULMONARY: [Respiratory distress absent. Respiratory effort normal. Breath sounds clear to auscultation without rhonchi, rales, or wheezing.  No accessory muscle use. No stridor]  ABDOMEN: [Inspection normal, without surgical scars. Soft, non-tender, non-distended, with normoactive bowel sounds. No palpable masses, rebound, or guarding]  BACK: [Intact ROM. No midline vertebral tenderness, step off, or crepitus. No CVA tenderness.]  MUSCULOSKELETAL: [Extremities nontender to palpation. No gross deformity or evidence of external trauma. Intact range of motion. Sensation intact. No clubbing, cyanosis, or edema.]  SKIN: [Warm, dry. No jaundice, rash, urticaria, or petechiae]  NEUROLOGIC: [Alert and oriented x 3, GCS 15, normal mentation for age. Moves all four extremities. No gross sensory deficit. Cerebellar function grossly normal. When asking who the president was she said, \"cunt\" \"no that's not it\" and \"chump\". For other questions, patient able to respond appropriately, NIH 1.]  PSYCHIATRIC: [Normal mood and affect, thought process is clear and linear]     DIFFERENTIAL DIAGNOSIS:   Differential Dx Lists - I consider the following to be a partial list of the possible etiologies for the patient's symptoms and based on my clinical findings as well and are part of my medical decision making:    Mental Status Changes: SDH, SAH, brain CA, infection, sepsis, UTI, pneumonia, hypoxia, hypoglycemia, medication, overdose, meningitis, TIA, CVA, UTI, hyperammonemia, and others    DIAGNOSTIC RESULTS     EKG: All EKG's are interpreted by the Emergency Department Physician who either signs or Co-signsthis chart in the absence of a cardiologist.    Shade Riser. Rate: 51 bpm  MI interval: 172 ms  QRS duration: 94 ms  QTc: 398 ms  P-R-T axes: 74, -16, -5  Sinus bradycardia   No STEMI    RADIOLOGY: non-plain film images(s) such as CT,Ultrasound and MRI are read by the radiologist.    CTA HEAD W WO CONTRAST         CTA NECK W WO CONTRAST         CT Head WO Contrast   Final Result       1.  Moderate-sized area of hypodensity in the posterior left temporal lobe extending into the B12 & FOLATE - Abnormal; Notable for the following components:    Vitamin B-12 180 (*)     All other components within normal limits   TSH WITH REFLEX - Abnormal; Notable for the following components:    TSH 6.660 (*)     All other components within normal limits   TROPONIN   PROTIME-INR   ETHANOL   ANION GAP   OSMOLALITY   LIPID PANEL   HEMOGLOBIN A1C   URINE DRUG SCREEN   URINE RT REFLEX TO CULTURE   T4, FREE   POCT GLUCOSE       EMERGENCY DEPARTMENT COURSE:   Vitals:    Vitals:    02/19/20 1532 02/19/20 1656 02/19/20 1835 02/19/20 2000   BP: (!) 181/71 (!) 173/74 (!) 186/84 (!) 160/80   Pulse: 52 55 55 56   Resp: 16 16 16 16   Temp:   98 °F (36.7 °C) 98.2 °F (36.8 °C)   TempSrc:   Oral Oral   SpO2: 100% 100% 99% 100%   Weight:   148 lb 9.4 oz (67.4 kg)    Height:   5' 2.99\" (1.6 m)      Based upon the patient's history, physical exam, and ED evaluation, I feel the patient's medical condition warrants admission to the hospital for further evaluation and treatment. I have discussed the patient with hospitalist who has agreed with the treatment plan and agreed to admit. I have transferred care of the patient to Dr. Debbie Donald. I have discussed the clinical presentation, work-up, and ED course thus far. Pending studies or tasks at this time are remaining labs, neuro consult. CRITICAL CARE:   Critical Care  Performed by: Ab Gross MD  Authorized by:  Angle Lima MD     Critical care provider statement:     Critical care time (minutes):  30    Critical care time was exclusive of:  Separately billable procedures and treating other patients    Critical care was necessary to treat or prevent imminent or life-threatening deterioration of the following conditions:  CNS failure or compromise    Critical care was time spent personally by me on the following activities:  Development of treatment plan with patient or surrogate, discussions with consultants, evaluation of patient's response to treatment, obtaining history from patient or surrogate, ordering and performing treatments and interventions, ordering and review of laboratory studies, ordering and review of radiographic studies, pulse oximetry, re-evaluation of patient's condition and review of old charts  Comments:      Patient presents with symptoms concerning for stroke, out side of tPA window        CONSULTS:  Hospitalist will admit    PROCEDURES:  None    FINAL IMPRESSION      1. Altered mental status, unspecified altered mental status type    2. Expressive aphasia    3. Late effect of cerebrovascular accident (CVA)    4. Ascending aortic aneurysm (HCC)    5. Elevated blood pressure reading          DISPOSITION/PLAN   Admit    PATIENT REFERRED TO:  [facility]  [address] [address]  Go today  ems    John Del Rosario, DO  [address]  [phone]            DISCHARGE MEDICATIONS:  Current Discharge Medication List          (Please note that portions ofthis note were completed with a voice recognition program.  Efforts were made to edit the dictations but occasionally words are mis-transcribed.)    Scribe:  Pamela Kirby 2/19/20 2:32 PM Scribing for and in the presence of @Ann Falcon MD@. Signed by: Alice Quinn, 02/19/20 8:20 PM    Provider:  I personally performed the services described in the documentation, reviewed and edited the documentation which was dictated to the scribe in my presence, and it accurately records my words and actions.     Cassandra Dyson MD 2/19/20 8:20 PM                  Cassandra Dyson MD  02/19/20 2022

## 2020-02-20 LAB
BILIRUBIN URINE: NEGATIVE
BLOOD, URINE: NEGATIVE
CHARACTER, URINE: CLEAR
COLOR: YELLOW
GLUCOSE URINE: NEGATIVE MG/DL
KETONES, URINE: NEGATIVE
LEUKOCYTE ESTERASE, URINE: NEGATIVE
LV EF: 60 %
LVEF MODALITY: NORMAL
NITRITE, URINE: NEGATIVE
PH UA: 5 (ref 5–9)
PROTEIN UA: NEGATIVE
SPECIFIC GRAVITY, URINE: > 1.03 (ref 1–1.03)
UROBILINOGEN, URINE: 0.2 EU/DL (ref 0–1)

## 2020-02-20 PROCEDURE — 97162 PT EVAL MOD COMPLEX 30 MIN: CPT

## 2020-02-20 PROCEDURE — 6360000002 HC RX W HCPCS: Performed by: PSYCHIATRY & NEUROLOGY

## 2020-02-20 PROCEDURE — 2580000003 HC RX 258: Performed by: INTERNAL MEDICINE

## 2020-02-20 PROCEDURE — 81003 URINALYSIS AUTO W/O SCOPE: CPT

## 2020-02-20 PROCEDURE — 93306 TTE W/DOPPLER COMPLETE: CPT

## 2020-02-20 PROCEDURE — 97530 THERAPEUTIC ACTIVITIES: CPT

## 2020-02-20 PROCEDURE — 97535 SELF CARE MNGMENT TRAINING: CPT

## 2020-02-20 PROCEDURE — 6370000000 HC RX 637 (ALT 250 FOR IP): Performed by: INTERNAL MEDICINE

## 2020-02-20 PROCEDURE — 97166 OT EVAL MOD COMPLEX 45 MIN: CPT

## 2020-02-20 PROCEDURE — 93010 ELECTROCARDIOGRAM REPORT: CPT | Performed by: INTERNAL MEDICINE

## 2020-02-20 PROCEDURE — 2709999900 HC NON-CHARGEABLE SUPPLY

## 2020-02-20 PROCEDURE — 6360000002 HC RX W HCPCS: Performed by: INTERNAL MEDICINE

## 2020-02-20 PROCEDURE — 92610 EVALUATE SWALLOWING FUNCTION: CPT

## 2020-02-20 PROCEDURE — 97116 GAIT TRAINING THERAPY: CPT

## 2020-02-20 PROCEDURE — 94760 N-INVAS EAR/PLS OXIMETRY 1: CPT

## 2020-02-20 PROCEDURE — 2060000000 HC ICU INTERMEDIATE R&B

## 2020-02-20 PROCEDURE — 92523 SPEECH SOUND LANG COMPREHEN: CPT

## 2020-02-20 PROCEDURE — 99232 SBSQ HOSP IP/OBS MODERATE 35: CPT | Performed by: PSYCHIATRY & NEUROLOGY

## 2020-02-20 PROCEDURE — 80305 DRUG TEST PRSMV DIR OPT OBS: CPT

## 2020-02-20 PROCEDURE — 99232 SBSQ HOSP IP/OBS MODERATE 35: CPT | Performed by: PHYSICIAN ASSISTANT

## 2020-02-20 RX ORDER — LANOLIN ALCOHOL/MO/W.PET/CERES
1000 CREAM (GRAM) TOPICAL DAILY
Status: DISCONTINUED | OUTPATIENT
Start: 2020-02-23 | End: 2020-02-21

## 2020-02-20 RX ORDER — CYANOCOBALAMIN 1000 UG/ML
1000 INJECTION INTRAMUSCULAR; SUBCUTANEOUS DAILY
Status: COMPLETED | OUTPATIENT
Start: 2020-02-20 | End: 2020-02-22

## 2020-02-20 RX ADMIN — ASPIRIN 81 MG 81 MG: 81 TABLET ORAL at 09:16

## 2020-02-20 RX ADMIN — CYANOCOBALAMIN 1000 MCG: 1000 INJECTION, SOLUTION INTRAMUSCULAR at 14:00

## 2020-02-20 RX ADMIN — CLOPIDOGREL 75 MG: 75 TABLET, FILM COATED ORAL at 09:16

## 2020-02-20 RX ADMIN — ENOXAPARIN SODIUM 40 MG: 40 INJECTION SUBCUTANEOUS at 09:16

## 2020-02-20 RX ADMIN — Medication 10 ML: at 09:17

## 2020-02-20 RX ADMIN — ATORVASTATIN CALCIUM 40 MG: 40 TABLET, FILM COATED ORAL at 20:23

## 2020-02-20 RX ADMIN — AMLODIPINE BESYLATE 5 MG: 5 TABLET ORAL at 09:16

## 2020-02-20 RX ADMIN — Medication 10 ML: at 20:23

## 2020-02-20 ASSESSMENT — PAIN SCALES - GENERAL
PAINLEVEL_OUTOF10: 0

## 2020-02-20 NOTE — PROGRESS NOTES
Hospitalist Progress Note      Patient:  Gildardo Mccann    Unit/Bed:4B-04/004-A  YOB: 1950  MRN: [id number]   Acct: [de-identified]   PCP: Samantha Aparicio DO  Date of Admission: 2/19/2020    Assessment/Plan:    1. Acute Ischemic CVA: Improving Left MCA M1 segment occlusion noted on CTA head. History of CVA in 2014 but not taking any medications outpatient. Confusion and expressive aphasia improving. Neurology evaluated and CVA workup initiated. PT/OT/ST evaluations and treatment. Echocardiogram shows no shunting and normal LVEF. Lipid panel and Hgb A1c normal. ASA/Plavix/Statin initiated 2/19/20. Lovenox daily. 2. HTN Urgency: /90 on admission, likely secondary to CVA. Amlodipine 5mg daily initiated with instruction to allow -180 for 24 hours. Sodium restricted diet. 3. Encephalopathy secondary to CVA: resolved. Patient was notably confused per family. She had difficulty finding her way around common places. This has resolved since admission. 4. B12 Deficiency: Without anemia. Vitamin B-12 daily ordered. Chief Complaint: Confusion    Initial H and P:-    81 y.o. female who presented to [facility] with confusion and difficulty speaking. Pt has a pmh of prior CVA in 20104, since then has never followed up with a doctor and has not been taking any medications. 2 days ago pt drove to get [employer], however couldn't remember how to get home shortly after. 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 Pt has also been having episodes of difficulty speaking over the past few days. Denies any facial droop, extremity weakness or numbness. Pt was seen by a family physician on 11/2019 of which she refused to take an medications. Pt takes Flax seed oil at home only.      In the ED, /90. Troponin non elevated. No ECG changes of ischemia.    CT head showing moderate size area of hypodensity left temporal lobe concerning for acute/subacute infarct.      Will admit for CVA work-up. Subjective (past 24 hours):   Patient states that she is doing well. She denies any current complaints. Patient states that she hs chronic mild right sided weakness s/p CVA in 2014 and also reports some residual right sided weakness. She states that she feels much better compared to the day of admission. Family reports she is able to ambulate individually and her speech has significantly improved. Past medical history, family history, social history and allergies reviewed again and is unchanged since admission. ROS (12 point review of systems completed. Pertinent positives noted. Otherwise ROS is negative)     Medications:  Reviewed    Infusion Medications   Scheduled Medications    cyanocobalamin  1,000 mcg Intramuscular Daily    [START ON 2/23/2020] vitamin B-12  1,000 mcg Oral Daily    aspirin  81 mg Oral Daily    atorvastatin  40 mg Oral Nightly    clopidogrel  75 mg Oral Daily    sodium chloride flush  10 mL Intravenous 2 times per day    enoxaparin  40 mg Subcutaneous Q24H    amLODIPine  5 mg Oral Daily     PRN Meds: sodium chloride flush, acetaminophen, acetaminophen, polyethylene glycol, promethazine, ondansetron      Intake/Output Summary (Last 24 hours) at 2/20/2020 1507  Last data filed at 2/20/2020 1444  Gross per 24 hour   Intake 1010 ml   Output 600 ml   Net 410 ml       Diet:  DIET CARDIAC; Exam:  BP (!) 145/53   Pulse 64   Temp 99 °F (37.2 °C) (Oral)   Resp 14   Ht 5' 2.99\" (1.6 m)   Wt 150 lb 12.7 oz (68.4 kg)   SpO2 99%   BMI 26.72 kg/m²   General appearance: No apparent distress, appears stated age and cooperative. HEENT: Pupils equal, round, and reactive to light. Conjunctivae/corneas clear. Neck: Supple, with full range of motion. No jugular venous distention. Trachea midline. Respiratory:  Normal respiratory effort.  Clear to auscultation, bilaterally without Rales/Wheezes/Rhonchi. Cardiovascular: Regular rate and rhythm with normal S1/S2 without murmurs, rubs or gallops. Abdomen: Soft, non-tender, non-distended with normal bowel sounds. Musculoskeletal: passive and active ROM x 4 extremities. Skin: Skin color, texture, turgor normal.  No rashes or lesions. Neurologic:  Neurovascularly intact without any focal sensory/motor deficits. Cranial nerves: II-XII intact, grossly non-focal.  Psychiatric: Alert and oriented, thought content appropriate, normal insight  Capillary Refill: Brisk,< 3 seconds   Peripheral Pulses: +2 palpable, equal bilaterally     Labs:   Recent Labs     02/19/20  1509   WBC 4.4*   HGB 12.6   HCT 38.8        Recent Labs     02/19/20  1509      K 4.1      CO2 23   BUN 11   CREATININE 0.9   CALCIUM 9.1     Recent Labs     02/19/20  1509   AST 14   ALT 8*   BILITOT 0.5   ALKPHOS 72     Recent Labs     02/19/20  1509   INR 1.09     No results for input(s): Tristian Segura in the last 72 hours. Microbiology:    Blood culture #1: No results found for: BC    Blood culture #2:No results found for: Alexander Saint    Organism:No results found for: ORG    No results found for: LABGRAM    MRSA culture only:No results found for: [facility]    Urine culture: No results found for: LABURIN    Respiratory culture: No results found for: CULTRESP    Aerobic and Anaerobic :  No results found for: LABAERO  No results found for: LABANAE    Urinalysis:      Lab Results   Component Value Date    NITRU NEGATIVE 02/20/2020    BLOODU NEGATIVE 02/20/2020    SPECGRAV 1.010 02/19/2020    GLUCOSEU NEGATIVE 02/20/2020       Radiology:  MRI BRAIN W WO CONTRAST   Final Result       1. Moderate to large acute infarct in the left posterior cerebral artery territory with smaller adjacent acute infarcts in the same territory.    2. These are superimposed on areas of encephalomalacia from prior infarcts in the bilateral posterior cerebral artery territories and in the flow peripherally from    collateral vessels. 3. Mural plaque at the bilateral carotid bulbs with 26% stenosis on the left and no hemodynamically significant stenosis on the right by NASCET criteria. 4. Ascending aortic aneurysm measuring up to 4.4 cm in greatest diameter. **This report has been created using voice recognition software. It may contain minor errors which are inherent in voice recognition technology. **      Final report electronically signed by Dr. Emelina Mckeon MD on 2/20/2020 8:40 AM      CT Head WO Contrast   Final Result       1. Moderate-sized area of hypodensity in the posterior left temporal lobe extending into the occipital lobe is concerning for acute/early subacute infarct given the patient's symptoms. A neoplasm is considered less likely. Recommend MRI of the brain with    contrast for further evaluation. 2. Focal areas of encephalomalacia in the left frontal lobe, parietal lobe and occipital lobe as evidence for old infarct. Some of these are stable compared to 2014. **This report has been created using voice recognition software. It may contain minor errors which are inherent in voice recognition technology. **      Final report electronically signed by Dr. Emelina Mckeon MD on 2/19/2020 3:13 PM      XR CHEST PORTABLE   Final Result   No definite infiltrate. **This report has been created using voice recognition software. It may contain minor errors which are inherent in voice recognition technology. **      Final report electronically signed by Dr. Krishna Reid on 2/19/2020 3:18 PM        Cta Head W Wo Contrast    Result Date: 2/20/2020  PROCEDURE: CTA HEAD W WO CONTRAST, CTA NECK W WO CONTRAST CLINICAL INFORMATION: cva . Expressive aphasia for 3 days. COMPARISON: CT head from the same date.  TECHNIQUE: Helical CT from the aortic arch through the head in arterial phase during intravenous administration of 80 mL Isovue-370 injected in left [facility] with multiplanar reformatted images to include volumetric maximum intensity projection sequences. FINDINGS: CTA HEAD: There are mural calcifications in the cavernous and clinoid segments of the internal carotid arteries with areas of mild stenosis. No aneurysm is identified in the intracranial segments of the internal carotid arteries. There is severe stenosis at the proximal M1 segment of left middle cerebral artery with minimal flow distally. There is long segment of mild to moderate stenosis throughout the M1 segment of the right middle cerebral artery. There is distal flow in the periphery on the right. The bilateral anterior cerebral arteries are patent without focal abnormality identified. The basilar artery is patent without focal stenosis or visualized aneurysm. There is complete occlusion at the P1 segment of the left posterior cerebral artery. There is also severe stenosis at the P1 segment of the right posterior cerebral artery with complete occlusion at the P2 segment. There is distal flow in the occipital lobes from collateral vessels. Dural venous sinuses appear patent without focal filling defect. No focal areas of abnormal parenchymal enhancement are identified. CTA NECK: There is a typical 3 vessel arch. The brachiocephalic and left subclavian arteries are patent without flow-limiting stenosis. The common carotid arteries are patent without focal stenosis. There is mild mural calcification at the right carotid bifurcation without hemodynamically significant stenosis by NASCET criteria. On the left side, there is calcified and noncalcified mural plaque at the left carotid bulb and proximal internal carotid artery with narrowest luminal diameter measuring 2.9 mm  and a point more distal, where the walls are parallel, measuring 3.9 mm. Cervical segments of the internal carotid arteries are otherwise patent without focal stenosis. The vertebral arteries are codominant.  There is mild mural calcification at the V4 segments of the vertebral arteries with mild stenosis. There are otherwise patent throughout their course without other focal area of stenosis. There is streak artifact from dental amalgam which partially obscures oral and perioral soft tissues. Vocal cords are closely apposed limiting evaluation of this area. Given these caveats, mucosal surfaces of the aerodigestive tract are symmetric without  focal nodular thickening or visualized mass. No cervical lymphadenopathy is identified. The parotid and submandibular glands are unremarkable. The thyroid gland is lobular in appearance and heterogeneous in density. Visualized portions of lungs are clear. The ascending aorta is enlarged measuring up to 4.4 cm in greatest diameter at the level of the main pulmonary artery. There are moderate degenerative changes of the cervical spine. 1. Severe stenosis at the proximal M1 segment of left middle cerebral artery with minimal flow in the distal segments of the left middle cerebral artery. 2. Complete stenosis at the P1 segment of the left posterior cerebral artery and severe stenosis at the P1 segment of the right posterior cerebral artery with complete occlusion at the P2 segment. There does appear to be some flow peripherally from collateral vessels. 3. Mural plaque at the bilateral carotid bulbs with 26% stenosis on the left and no hemodynamically significant stenosis on the right by NASCET criteria. 4. Ascending aortic aneurysm measuring up to 4.4 cm in greatest diameter. **This report has been created using voice recognition software. It may contain minor errors which are inherent in voice recognition technology. ** Final report electronically signed by Dr. Mila Arana MD on 2/20/2020 8:40 AM    Ct Head Wo Contrast    Result Date: 2/19/2020  PROCEDURE: CT HEAD WO CONTRAST CLINICAL INFORMATION: expressive aphasia X 3 days. COMPARISON: MR brain dated 11/5/2014.  There are moderate sized area as evidence TECHNIQUE: Noncontrast 5 mm axial images were obtained through the brain. Sagittal and coronal reconstructions were obtained. All CT scans at this facility use dose modulation, iterative reconstruction, and/or weight-based dosing when appropriate to reduce radiation dose to as low as reasonably achievable. FINDINGS: There are moderate-sized areas of encephalomalacia in the left parietal lobe and left occipital lobe as well as the left frontal lobe. Left frontal and parietal areas of encephalomalacia are stable compared to 2014. The area of occipital lobe encephalomalacia has occurred in the interval since 2014. In addition, there is a moderate-sized area of hypodensity in the posterior left temporal lobe extending into the occipital lobe with mild mass effect on the occipital horn of the left lateral ventricle. No intracranial hemorrhage is present. No midline shift is present. The calvarium appears intact. Orbits are unremarkable. Visualized paranasal sinuses are clear. Mastoid air cells are clear. 1. Moderate-sized area of hypodensity in the posterior left temporal lobe extending into the occipital lobe is concerning for acute/early subacute infarct given the patient's symptoms. A neoplasm is considered less likely. Recommend MRI of the brain with  contrast for further evaluation. 2. Focal areas of encephalomalacia in the left frontal lobe, parietal lobe and occipital lobe as evidence for old infarct. Some of these are stable compared to 2014. **This report has been created using voice recognition software. It may contain minor errors which are inherent in voice recognition technology. ** Final report electronically signed by Dr. Marcie Torres MD on 2/19/2020 3:13 PM    Cta Neck W Wo Contrast    Result Date: 2/20/2020  PROCEDURE: CTA HEAD W WO CONTRAST, CTA NECK W WO CONTRAST CLINICAL INFORMATION: cva . Expressive aphasia for 3 days. COMPARISON: CT head from the same date.  TECHNIQUE: internal carotid arteries are otherwise patent without focal stenosis. The vertebral arteries are codominant. There is mild mural calcification at the V4 segments of the vertebral arteries with mild stenosis. There are otherwise patent throughout their course without other focal area of stenosis. There is streak artifact from dental amalgam which partially obscures oral and perioral soft tissues. Vocal cords are closely apposed limiting evaluation of this area. Given these caveats, mucosal surfaces of the aerodigestive tract are symmetric without  focal nodular thickening or visualized mass. No cervical lymphadenopathy is identified. The parotid and submandibular glands are unremarkable. The thyroid gland is lobular in appearance and heterogeneous in density. Visualized portions of lungs are clear. The ascending aorta is enlarged measuring up to 4.4 cm in greatest diameter at the level of the main pulmonary artery. There are moderate degenerative changes of the cervical spine. 1. Severe stenosis at the proximal M1 segment of left middle cerebral artery with minimal flow in the distal segments of the left middle cerebral artery. 2. Complete stenosis at the P1 segment of the left posterior cerebral artery and severe stenosis at the P1 segment of the right posterior cerebral artery with complete occlusion at the P2 segment. There does appear to be some flow peripherally from collateral vessels. 3. Mural plaque at the bilateral carotid bulbs with 26% stenosis on the left and no hemodynamically significant stenosis on the right by NASCET criteria. 4. Ascending aortic aneurysm measuring up to 4.4 cm in greatest diameter. **This report has been created using voice recognition software. It may contain minor errors which are inherent in voice recognition technology. ** Final report electronically signed by Dr. Karen Samano MD on 2/20/2020 8:40 AM    Xr Chest Portable    Result Date: 2/19/2020  PROCEDURE: XR CHEST

## 2020-02-20 NOTE — PROGRESS NOTES
1/3    Expressive:  Automatic Speech: 1/2 indep, 1/2 initation cue  Sentence Completion (open-ended): 2/3  Confrontational Namin/3  Conversational Speech: [facility] with noted semantic paraphasias     COGNITION:  [place] Cognitive Assessment (MOCA) version 7.2a completed. Pt scored 3/30. Normal is greater than or equal to 26/30. Orientation: 2/6  Immediate Recall: 2/5  Short-Term Recall: 0/5  Divergent Namin members/60 seconds (WFL = 11 members/60 seconds)  Problem Solvin2  Reasonin/3  Sequencing: Max cues for adequate description, prevalence of semantic paraphasias   Thought Organization: Impaired  Insight: WFL  Attention: 0/3  Math Computation: 0/3  Executive Functionin/5     RECOMMENDATIONS/ASSESSMENT:  DIAGNOSTIC IMPRESSIONS:  Pt receptive language skills mildly impaired characterized by difficulty with ID of objects from FO1 and difficulty following 2-step commands. Pt presents with severe deficits in expressive language characterized by frequent semantic paraphasias, poor automatic speech, impaired confrontational naming, and decreased accuracy for sentence completion tasks. Pt also requires increased time for communication d/t poor word recall abilities. Pt presents with cognitive deficits that are further exacerbated by language difficulties. Severely impaired short-term recall, executive functioning, math computation, attention, divergent naming, thought organization, and sequencing. Significant deficits with orientation, immediate recall, problem solving, and reasoning. Pt presents with no dysphonia or dysarthria. Pt tolerates current diet of regular solids and thin liquids with good success. ST recommended to continue to monitor diet, to improve expressive and receptive language skills to proximity of prior function, and to further assess cognition as language skills improve.    Rehabilitation Potential: Fair      EDUCATION:  Learner: Patient  Education:  Reviewed results and

## 2020-02-20 NOTE — PROGRESS NOTES
Faisal Beard 62  INPATIENT OCCUPATIONAL THERAPY  [facility] CVICU 4B  EVALUATION    Time:   Time In:   Time Out: 915  Timed Code Treatment Minutes: 23 Minutes  Minutes: 37          Date: 2020  Patient Name: Rosy Kessler,   Gender: female      MRN: [id number]  : 1950  (81 y.o.)  Referring Practitioner: Bernie Dinero MD  Diagnosis: acute CVA  Additional Pertinent Hx: Per H&P: 81 y.o. female who presented to [facility] with confusion and difficulty speaking. Pt has a pmh of prior CVA in , since then has never followed up with a doctor and has not been taking any medications. 2 days ago pt drove to get [employer], however couldn't remember how to get home shortly after. Owner of [employer] place had to call her  to come pick her up. Pt has also been having episodes of difficulty speaking over the past few days. CT head showing moderate size area of hypodensity left temporal lobe concerning for acute/subacute infarct. Restrictions/Precautions:  Restrictions/Precautions: General Precautions, Fall Risk    Subjective  Chart Reviewed: Yes, Orders, Progress Notes, History and Physical  Patient assessed for rehabilitation services?: Yes  Family / Caregiver Present: Yes()    Subjective: Pt seated in bed upon arrival, just had echo completed. Pt agreeable to OT session. Pt noted to have some expressive aphasia/word finding difficulties, question receptive aphasia as well.      Pain:  Pain Assessment  Patient Currently in Pain: Denies    Social/Functional History:  Lives With: Spouse(works during day-part time)  Type of Home: House  Home Layout: One level  Home Equipment: (none- reported could obtain cane/walker if needed)   Bathroom Shower/Tub: Tub/Shower unit  Bathroom Toilet: Standard  Bathroom Equipment: (none)       ADL Assistance: Independent  Homemaking Assistance: Independent  Ambulation Assistance: Independent  Transfer Assistance: Independent    Active :

## 2020-02-20 NOTE — PROGRESS NOTES
[facility]  [facility]ER[facility]  [facility] CVICU 4B  Speech - Language - Cognitive Evaluation - Bedside Evaluation    SLP Individual Minutes  Time In: 7045  Time Out: 1400  Minutes: 43  Timed Code Treatment Minutes: 0 Minutes   BSE-15 minutes  Cognitive Evaluation-28 minutes    Date: 2020  Patient Name: Laurie Molina      CSN: [id number]   : 1950  (81 y.o.)  Gender: female   Referring Physician:  Lisa Johnson MD  Diagnosis: Acute CVA  Secondary Diagnosis: Expressive Language deficits, Cognitive deficits, Dysphagia   Precautions: Fall risk, aspiration precautions  History of Present Illness/Injury: Pt admitted to [facility] with \"confusion and difficulty speaking\". Per chart review \"Pt has a pmh of prior CVA in . Episodes of difficulty speaking recently. No facial droop, extremity weakness, or numbness. Refuses medications. CT head showing moderate size area of hypodensity left temporal lobe concerning for acute/subacute infarct\". Recommended ST consult d/t evidence of stroke-like symptoms. ST evaluation of swallow and cognitive functioning to determine goals and POC. Past Medical History:   Diagnosis Date    Stroke [facility]) 14    Unspecified cerebral artery occlusion with cerebral infarction        Pain: No pain reported. Subjective:  Pt seen bedside with lunch. Alert and agreeable to ST evaluation. Pleasant affect. Became frustrated at end of evaluation evidenced by heightened emotional state d/t difficulty with language ability, ST counseled pt of POC and etiology of deficits. SOCIAL HISTORY: Pt former cook, currently retired. Lives with , has three daughters who live nearby. Language difficulties present prior to most recent stroke. Pt independently managed finances, medications, and was able to drive. Pt denies hearing aids and dentures, but confirms glasses.     Type of Home: House  Home Layout: Two level, Able to Live on Main level with bedroom/bathroom  Home Access: 1/3    Expressive:  Automatic Speech: 1/2 indep, 1/2 initation cue  Sentence Completion (open-ended): 2/3  Confrontational Namin/3  Conversational Speech: WFL with noted semantic paraphasias     COGNITION:  Margarito Cognitive Assessment (MOCA) version 7.2a completed. Pt scored 3/30. Normal is greater than or equal to 26/30. Orientation: 2/6  Immediate Recall: 2/5  Short-Term Recall: 0/5  Divergent Namin members/60 seconds (WFL = 11 members/60 seconds)  Problem Solvin2  Reasonin/3  Sequencing: Max cues for adequate description, prevalence of semantic paraphasias   Thought Organization: Impaired  Insight: WFL  Attention: 0/3  Math Computation: 0/3  Executive Functionin/5     RECOMMENDATIONS/ASSESSMENT:  DIAGNOSTIC IMPRESSIONS:  Pt presents with moderately-severe cognitive linguistic impairment characterized by the findings above. Pt receptive language skills mildly impaired characterized by difficulty with ID of objects from [facility] ([facility]) and difficulty following 2-step commands. Pt presents with severe deficits in expressive language characterized by frequent semantic paraphasias, poor automatic speech, impaired confrontational naming, and decreased accuracy for sentence completion tasks. Pt also requires increased time for communication d/t poor word recall abilities. Pt presents with cognitive deficits that are further exacerbated by language difficulties. Severely impaired short-term recall, executive functioning, math computation, attention, divergent naming, thought organization, and sequencing. Significant deficits with orientation, immediate recall, problem solving, and reasoning. Pt presents with no dysphonia or dysarthria. ST recommended to continue to monitor diet, to improve expressive and receptive language skills to proximity of prior function, and to further assess cognition as language skills improve.    Rehabilitation Potential: Fair      EDUCATION:  Learner: Patient  Education: Reviewed results and recommendations of this evaluation, Reviewed diet and strategies and Reviewed ST goals and Plan of Care  Evaluation of Education: Verbalizes understanding, Demonstrates with assistance and Needs further instruction    PLAN:  Skilled SLP intervention on acute care 3-5 x per week or until goals met and/or pt plateaus in function. Specific interventions for next session may include: Receptive and Expressive language, Dysphagia treatment, Cognitive treatment. PATIENT GOAL:    Return to prior level of function. SHORT TERM GOALS:  Short-term Goals  Timeframe for Short-term Goals: 2 weeks  Goal 1: Patient will consume current diet of regular solids and thin liquids with no overt s/s of aspiration to meet nutrional/hyrdational needs. Goal 2: Pt will complete basic expressive langauge tasks (confrontational naming, automatic speech tasks, sentence completion) with 70% accuracy and mod cues to improve ability to communicate basic wants and needs with family and hospital staff  Goal 3: Pt will complete functional receptive language tasks (2-3 step commands, picture ID FO2, reading comprehension) with 75% accuracy and min cues to improve ability to manage and understand prescriptions, finanances, and tax documentations  Goal 4: Pt will complete basic orientational tasks using gestures (pointing to response from FO2/4) or writing with 80% accuracy and min cues to awareness into current environments. Goal 5: Continue to monitor cognition and determine more appropriate goals as language ability improves. LONG TERM GOALS:  LTGs not established d/t short JONAH Avendano., Speech-Intern  Abdiel Rainey M.S. [place] [id number]

## 2020-02-20 NOTE — PROGRESS NOTES
[address] [place] [address]  INPATIENT PHYSICAL THERAPY  EVALUATION  [facility] CVICU 4B - 4B-04/004-A    Time In: 7439  Time Out: 0176  Timed Code Treatment Minutes: 8 Minutes  Minutes: 20          Date: 2020  Patient Name: Rosita Gibbs,  Gender:  female        MRN: [id number]  : 1950  (81 y.o.)      Referring Practitioner: Dr. Ivy Leavitt  Diagnosis: acute CVA  Additional Pertinent Hx:  Per H&P: 81 y.o. female who presented to [address] [place] [address] with confusion and difficulty speaking. Pt has a pmh of prior CVA in , since then has never followed up with a doctor and has not been taking any medications. 2 days ago pt drove to get [employer], however couldn't remember how to get home shortly after. Owner of [employer] place had to call her  to come pick her up. Pt has also been having episodes of difficulty speaking over the past few days. CT head showing moderate size area of hypodensity left temporal lobe concerning for acute/subacute infarct. Restrictions/Precautions:  Restrictions/Precautions: General Precautions, Fall Risk    Subjective:  Chart Reviewed: Yes  Patient assessed for rehabilitation services?: Yes  Family / Caregiver Present: Yes  Subjective: pleasant and cooperative, cues for direction with amb, spouse stated pt's gait appears close to baseline-more worried about her aphasia    General:                      Pain:  Denies.   Pain Assessment  Pain Level: 0       Social/Functional History:    Lives With: Spouse(works during day-part time)  Type of Home: House  Home Layout: Two level, Able to Live on Main level with bedroom/bathroom  Home Access: Stairs to enter with rails  Entrance Stairs - Number of Steps: 4  Home Equipment: (none- reported could obtain cane/walker if needed)     Bathroom Shower/Tub: Tub/Shower unit  Bathroom Toilet: Standard  Bathroom Equipment: (none)       ADL Assistance: Independent  Homemaking Assistance: Independent  Ambulation Assistance: Independent  Transfer Assistance: Independent    Active : Yes     Additional Comments:  reported pt did have some residual expressive aphasia since prior CVA.  reported h/o RUE weakness initially after CVA although has resolved. OBJECTIVE:  Range of Motion:  Bilateral Lower Extremity: WFL    Strength:  Bilateral Lower Extremity: WFL    Balance:  Static Sitting Balance:  Modified Independent  Dynamic Sitting Balance: Modified Independent  Static Standing Balance: Supervision  Dynamic Standing Balance: Stand By Assistance    Bed Mobility:  Supine to Sit: Modified Independent  Scooting: Modified Independent    Transfers:  Sit to Stand: Stand By Assistance  Stand to Sit:Stand By Assistance    Ambulation:  Stand By Assistance  Distance: 5'x1, 120'x2  Surface: Level Tile  Device:No Device  Gait Deviations:  Steady, no LOB, min limp with hx of foot pain-spouse and pt state is pt's baseline, cues for path finding and for visual scanning    Negotiated 3 steps without HR, reciprocal pattern, S, no LOB        Functional Outcome Measures: Completed  Balance Score: 16  Gait Score: 12  Tinetti Total Score: 28  AM-PAC Inpatient Mobility without Stair Climbing Raw Score : 17  AM-PAC Inpatient without Stair Climbing T-Scale Score : 48.47    ASSESSMENT:  Activity Tolerance:  Patient tolerance of  treatment: good. Treatment Initiated: Treatment and education initiated within context of evaluation. Evaluation time included review of current medical information, gathering information related to past medical, social and functional history, completion of standardized testing, formal and informal observation of tasks, assessment of data and development of plan of care and goals. Treatment time included skilled education and facilitation of tasks to increase safety and independence with functional mobility for improved independence and quality of life. Assessment:   Body structures, Functions, Activity limitations: Decreased functional mobility , Decreased cognition  Assessment: pt tolerated fair, generalized weakness, dec balance, aphasia and difficulty with path finding, recommend cont PT to inc pt I with functional mobility  Prognosis: Excellent    REQUIRES PT FOLLOW UP: Yes    Discharge Recommendations:  Discharge Recommendations: Continue to assess pending progress    Patient Education:  PT Education: Goals, Plan of Care    Equipment Recommendations:  Equipment Needed: No    Plan:  Times per week: 3-5X N  Times per day: Daily  Specific instructions for Next Treatment: amb, path finding, steps    Goals:  Patient goals : return home  Short term goals  Time Frame for Short term goals: by discharge  Short term goal 1: pt MOD I with amb without AD and able to perform path finding back to room  Short term goal 2: negotiate 4 steps HR and S to enter home safely  Long term goals  Time Frame for Long term goals : no LTGs set secondary to short ELOS    Following session, patient left in safe position with all fall risk precautions in place.

## 2020-02-20 NOTE — PROGRESS NOTES
Stroke Folder given.    What is Stroke/CVA  Signs and Symptoms of stroke (BEFAST)  Treatments for Stroke  Personal Risk Factors for Stroke discussed  Education--Call [phone]

## 2020-02-20 NOTE — CARE COORDINATION
20, 8:27 AM  DISCHARGE PLANNING EVALUATION:    Ayo Galloway       Admitted from: ED [address] [facility] day: 1   Location: [address] [place] [address]A Reason for admit: Acute CVA (cerebrovascular accident) [facility]) [I63.9] Status: IP  Admit order signed?: yes  PMH:  has a past medical history of Stroke ([address] and Unspecified cerebral artery occlusion with cerebral infarction. Procedure: none  Pertinent abnormal Imagin/19 CT Head: Moderate-sized area of hypodensity in the posterior left temporal lobe extending into the occipital lobe is concerning for acute/early subacute infarct given the patient's symptoms. A neoplasm is considered less likely; Focal areas of encephalomalacia in the left frontal lobe, parietal lobe and occipital lobe as evidence for old infarct. Some of these are stable compared to  CTA Head/Neck: Severe stenosis at the proximal M1 segment of left middle cerebral artery with minimal flow in the distal segments of the left middle cerebral artery; Complete stenosis at the P1 segment of the left posterior cerebral artery and severe stenosis at the P1 segment of the right posterior cerebral artery with complete occlusion at the P2 segment. There does appear to be some flow peripherally from collateral vessels; Mural plaque at the bilateral carotid bulbs with 26% stenosis on the left and no hemodynamically significant stenosis on the right by NASCET criteria; Ascending aortic aneurysm measuring up to 4.4 cm in greatest diameter   MRI Brain: Moderate to large acute infarct in the left posterior cerebral artery territory with smaller adjacent acute infarcts in the same territory;  These are superimposed on areas of encephalomalacia from prior infarcts in the bilateral posterior cerebral artery territories and in the left middle cerebral artery territory with the posterior cerebral artery territory chronic infarcts having occurred in the interval since ; Mild chronic microvascular angiopathy; Markedly attenuated flow voids in the left middle cerebral artery and left posterior cerebral artery  2/19 CXR: No definite infiltrate  Medications:  Scheduled Meds:   aspirin  81 mg Oral Daily    atorvastatin  40 mg Oral Nightly    clopidogrel  75 mg Oral Daily    sodium chloride flush  10 mL Intravenous 2 times per day    enoxaparin  40 mg Subcutaneous Q24H    amLODIPine  5 mg Oral Daily     Continuous Infusions:   Pertinent Info/Orders/Treatment Plan: Presented with c/o confusion and difficulty speaking. Hx of CVA in 2014; never followed up with physician and has not been taking meds. 2 days PTA patient drove to get pizza and could not remember how to get home. Episodes of difficulty speaking for past few days. Saw PCP on 11/2019 and refused to take any medications. BP in ED was 205/90. Neurology consulted for aphasia. Echo ordered. Afebrile. SB. On room air. Oriented to person and place. Follows commands with delayed responses. SLP/PT/OT. Up with assist. Norvasc, asa, lipitor, plavix, lovenox. Trop neg. TSH 6.66. WBC 4.4. Diet: DIET CARDIAC;   Smoking status:  reports that she has never smoked. She has never used smokeless tobacco.   PCP: Ursula Hammond DO  Readmission 30 days or less: no  Readmission Risk Score: 8%    Discharge Planning Evaluation  Current Residence:  Private Residence  Living Arrangements:  Spouse/Significant Other   Support Systems:  Spouse/Significant Other  Current Services PTA:     Potential Assistance Needed:  N/A  Potential Assistance Purchasing Medications:  No  Does patient want to participate in local refill/ meds to beds program?  No  Type of Home Care Services:  None  Patient expects to be discharged to:  home with   Expected Discharge date:  02/22/20  Follow Up Appointment: Best Day/ Time: Tuesday PM    Patient Goals/Plan/Treatment Preferences: Spoke with Mila's , Lukas Olmedo; states she did not use any DME or have any HH services PTA.  He states she will not be driving any more until she gets her cataracts fixed; he will take her to appointments and where she needs to go. She cares for herself independently and has a PCP. [facility] states after the stroke they did not prescribe her any medications. He states she just saw her PCP towards the end of the year and he told her to take aspirin and cholesterol medication. He states she was taking the baby aspirin, but quit taking the cholesterol medication because it made her sick. [facility] states the plan is for Simone to return home at discharge, denies needs, and declines HH. Plan for 30 day event monitor at discharge. Monitor PT/OT evals for possible needs. Transportation/Food Security/Housekeeping Addressed:  No issues identified.     Evaluation: no

## 2020-02-20 NOTE — PROGRESS NOTES
Encounter Date: 2/20/2020    Portions of this notes is copied from previous physician encounters, reviewed and edited appropriately. Kimberly Baugh is a 81 y.o. female stenting with right-sided weakness, right homonymous hemianopia, worsening speech problems. OVERNIGHT: No new changes. Review of systems   No neck stiffness  No photophobia  All systems reviewed and are negative. Current Facility-Administered Medications   Medication Dose Route Frequency Provider Last Rate Last Dose    aspirin chewable tablet 81 mg  81 mg Oral Daily Tata Guadarrama MD   81 mg at 02/19/20 2019    atorvastatin (LIPITOR) tablet 40 mg  40 mg Oral Nightly Tata Guadarrama MD   40 mg at 02/19/20 2318    clopidogrel (PLAVIX) tablet 75 mg  75 mg Oral Daily Tata Guadarrama MD   75 mg at 02/19/20 2019    sodium chloride flush 0.9 % injection 10 mL  10 mL Intravenous 2 times per day Tata Guadarrama MD   10 mL at 02/19/20 2017    sodium chloride flush 0.9 % injection 10 mL  10 mL Intravenous PRN Tata Guadarrama MD        acetaminophen (TYLENOL) tablet 650 mg  650 mg Oral Q6H PRN Tata Guadarrama MD        acetaminophen (TYLENOL) suppository 650 mg  650 mg Rectal Q6H PRN Tata Guadarrama MD        polyethylene glycol (GLYCOLAX) packet 17 g  17 g Oral Daily PRN Tata Guadarrama MD        promethazine (PHENERGAN) tablet 12.5 mg  12.5 mg Oral Q6H PRN Tata Guadarrama MD        ondansetron [facility]) injection 4 mg  4 mg Intravenous Q6H PRN Tata Guadarrama MD        enoxaparin (LOVENOX) injection 40 mg  40 mg Subcutaneous Q24H Tata Guadarrama MD        amLODIPine (NORVASC) tablet 5 mg  5 mg Oral Daily Tata Guadarrama MD   5 mg at 02/19/20 2318     No Known Allergies    PHYSICAL EXAMINATION:    Vitals:   Patient Vitals for the past 4 hrs:   BP Temp Temp src Pulse Resp SpO2   02/20/20 0846 (!) 174/68 97.9 °F (36.6 °C) Oral 52 17 96 %           General:  pleasant in no acute distress.   HEENT: No pallor or Narrative PROCEDURE: CTA HEAD W WO CONTRAST, CTA NECK W WO CONTRAST    CLINICAL INFORMATION: cva . Expressive aphasia for 3 days. COMPARISON: CT head from the same date. TECHNIQUE: Helical CT from the aortic arch through the head in arterial phase during intravenous administration of 80 mL Isovue-370 injected in left AC with multiplanar reformatted images to include volumetric maximum intensity projection sequences. FINDINGS:     CTA HEAD:  There are mural calcifications in the cavernous and clinoid segments of the internal carotid arteries with areas of mild stenosis. No aneurysm is identified in the intracranial segments of the internal carotid arteries. There is severe stenosis at the   proximal M1 segment of left middle cerebral artery with minimal flow distally. There is long segment of mild to moderate stenosis throughout the M1 segment of the right middle cerebral artery. There is distal flow in the periphery on the right. The   bilateral anterior cerebral arteries are patent without focal abnormality identified. The basilar artery is patent without focal stenosis or visualized aneurysm. There is complete occlusion at the P1 segment of the left posterior cerebral artery. There   is also severe stenosis at the P1 segment of the right posterior cerebral artery with complete occlusion at the P2 segment. There is distal flow in the occipital lobes from collateral vessels. Dural venous sinuses appear patent without focal filling   defect. No focal areas of abnormal parenchymal enhancement are identified. CTA NECK:  There is a typical 3 vessel arch. The brachiocephalic and left subclavian arteries are patent without flow-limiting stenosis. The common carotid arteries are patent without focal stenosis. There is mild mural calcification at the right carotid   bifurcation without hemodynamically significant stenosis by NASCET criteria.  On the left side, there is calcified and noncalcified mural plaque at the left carotid bulb and proximal internal carotid artery with narrowest luminal diameter measuring 2.9 mm   and a point more distal, where the walls are parallel, measuring 3.9 mm. Cervical segments of the internal carotid arteries are otherwise patent without focal stenosis. The vertebral arteries are codominant. There is mild mural calcification at the V4   segments of the vertebral arteries with mild stenosis. There are otherwise patent throughout their course without other focal area of stenosis. There is streak artifact from dental amalgam which partially obscures oral and perioral soft tissues. Vocal cords are closely apposed limiting evaluation of this area. Given these caveats, mucosal surfaces of the aerodigestive tract are symmetric without   focal nodular thickening or visualized mass. No cervical lymphadenopathy is identified. The parotid and submandibular glands are unremarkable. The thyroid gland is lobular in appearance and heterogeneous in density. Visualized portions of lungs are   clear. The ascending aorta is enlarged measuring up to 4.4 cm in greatest diameter at the level of the main pulmonary artery. There are moderate degenerative changes of the cervical spine. Impression    1. Severe stenosis at the proximal M1 segment of left middle cerebral artery with minimal flow in the distal segments of the left middle cerebral artery. 2. Complete stenosis at the P1 segment of the left posterior cerebral artery and severe stenosis at the P1 segment of the right posterior cerebral artery with complete occlusion at the P2 segment. There does appear to be some flow peripherally from   collateral vessels. 3. Mural plaque at the bilateral carotid bulbs with 26% stenosis on the left and no hemodynamically significant stenosis on the right by NASCET criteria. 4. Ascending aortic aneurysm measuring up to 4.4 cm in greatest diameter.           **This report has been created using voice recognition software. It may contain minor errors which are inherent in voice recognition technology. **    Final report electronically signed by Dr. Patricia Edmond MD on 2/20/2020 8:40 AM     Results for orders placed during the hospital encounter of 02/19/20   CTA NECK W WO CONTRAST    Narrative PROCEDURE: CTA HEAD W WO CONTRAST, CTA [address] INFORMATION: cva . Expressive aphasia for 3 days. COMPARISON: CT head from the same date. TECHNIQUE: Helical CT from the aortic arch through the head in arterial phase during intravenous administration of 80 mL Isovue-370 injected in left AC with multiplanar reformatted images to include volumetric maximum intensity projection sequences. FINDINGS:     CTA HEAD:  There are mural calcifications in the cavernous and clinoid segments of the internal carotid arteries with areas of mild stenosis. No aneurysm is identified in the intracranial segments of the internal carotid arteries. There is severe stenosis at the   proximal M1 segment of left middle cerebral artery with minimal flow distally. There is long segment of mild to moderate stenosis throughout the M1 segment of the right middle cerebral artery. There is distal flow in the periphery on the right. The   bilateral anterior cerebral arteries are patent without focal abnormality identified. The basilar artery is patent without focal stenosis or visualized aneurysm. There is complete occlusion at the P1 segment of the left posterior cerebral artery. There   is also severe stenosis at the P1 segment of the right posterior cerebral artery with complete occlusion at the P2 segment. There is distal flow in the occipital lobes from collateral vessels. Dural venous sinuses appear patent without focal filling   defect. No focal areas of abnormal parenchymal enhancement are identified. CTA NECK:  There is a typical 3 vessel arch.  The brachiocephalic and left subclavian arteries are patent without flow-limiting stenosis. The common carotid arteries are patent without focal stenosis. There is mild mural calcification at the right carotid   bifurcation without hemodynamically significant stenosis by NASCET criteria. On the left side, there is calcified and noncalcified mural plaque at the left carotid bulb and proximal internal carotid artery with narrowest luminal diameter measuring 2.9 mm   and a point more distal, where the walls are parallel, measuring 3.9 mm. Cervical segments of the internal carotid arteries are otherwise patent without focal stenosis. The vertebral arteries are codominant. There is mild mural calcification at the V4   segments of the vertebral arteries with mild stenosis. There are otherwise patent throughout their course without other focal area of stenosis. There is streak artifact from dental amalgam which partially obscures oral and perioral soft tissues. Vocal cords are closely apposed limiting evaluation of this area. Given these caveats, mucosal surfaces of the aerodigestive tract are symmetric without   focal nodular thickening or visualized mass. No cervical lymphadenopathy is identified. The parotid and submandibular glands are unremarkable. The thyroid gland is lobular in appearance and heterogeneous in density. Visualized portions of lungs are   clear. The ascending aorta is enlarged measuring up to 4.4 cm in greatest diameter at the level of the main pulmonary artery. There are moderate degenerative changes of the cervical spine. Impression    1. Severe stenosis at the proximal M1 segment of left middle cerebral artery with minimal flow in the distal segments of the left middle cerebral artery. 2. Complete stenosis at the P1 segment of the left posterior cerebral artery and severe stenosis at the P1 segment of the right posterior cerebral artery with complete occlusion at the P2 segment. There does appear to be some flow peripherally from   collateral vessels.   3. Mural plaque at the bilateral carotid bulbs with 26% stenosis on the left and no hemodynamically significant stenosis on the right by NASCET criteria. 4. Ascending aortic aneurysm measuring up to 4.4 cm in greatest diameter. **This report has been created using voice recognition software. It may contain minor errors which are inherent in voice recognition technology. **    Final report electronically signed by Dr. Carlos Eduardo Paul MD on 2/20/2020 8:40 AM     No results found for this or any previous visit. Results for orders placed during the hospital encounter of 02/19/20   MRI BRAIN W WO CONTRAST    Narrative ============== PRELIMINARY RESULT ADDENDUM BEGINS =================    ADDENDUM:      Findings discussed with Karena Valdez RN at 10:15 PM EST on 2/19/2020      This document is electronically signed by Erick Watters MD., February 19 2020 10:17:54 PM ET    =============== PRELIMINARY RESULT ADDENDUM ENDS ==================    PROCEDURE: MRI BRAIN W WO CONTRAST      HISTORY: rule out cva      COMPARISON: None available. FINDINGS:      Approximately 6.6 cm AP by 3.6 cm transverse by 1.8 cm CC area of restricted diffusion involving the posterior medial left temporal lobe in keeping with an acute cerebral infarction. Multiple foci of hyperintense flair signal within the left frontal white matter tracts of the centrum semiovale (MCA territory), consistent with chronic sequela of atherosclerotic microvascular ischemic disease. No gross acute infarction, intracranial hemorrhage, mass lesions, midline shift, mass effect or hydrocephalus seen. Diffuse cerebral cortical atrophy. Normal flow void signal is seen in the cerebral vasculature. The calvarium and clivus are intact. The paranasal sinuses and mastoid air cells are clear. No suprasellar mass or optic chiasm compression. No cerebellopontine angle or intraorbital lesion seen.           - Approximately 6.6 cm AP by

## 2020-02-21 PROBLEM — E53.8 B12 DEFICIENCY: Status: ACTIVE | Noted: 2020-02-21

## 2020-02-21 PROBLEM — H53.461 HOMONYMOUS HEMIANOPIA, RIGHT: Status: ACTIVE | Noted: 2020-02-21

## 2020-02-21 LAB
AMPHETAMINE+METHAMPHETAMINE URINE SCREEN: NEGATIVE
BARBITURATE QUANTITATIVE URINE: NEGATIVE
BENZODIAZEPINE QUANTITATIVE URINE: NEGATIVE
CANNABINOID QUANTITATIVE URINE: NEGATIVE
COCAINE METABOLITE QUANTITATIVE URINE: NEGATIVE
OPIATES, URINE: NEGATIVE
OXYCODONE: NEGATIVE
PHENCYCLIDINE QUANTITATIVE URINE: NEGATIVE

## 2020-02-21 PROCEDURE — 6370000000 HC RX 637 (ALT 250 FOR IP): Performed by: INTERNAL MEDICINE

## 2020-02-21 PROCEDURE — 97116 GAIT TRAINING THERAPY: CPT

## 2020-02-21 PROCEDURE — 97110 THERAPEUTIC EXERCISES: CPT

## 2020-02-21 PROCEDURE — 2060000000 HC ICU INTERMEDIATE R&B

## 2020-02-21 PROCEDURE — 99232 SBSQ HOSP IP/OBS MODERATE 35: CPT | Performed by: PSYCHIATRY & NEUROLOGY

## 2020-02-21 PROCEDURE — 97530 THERAPEUTIC ACTIVITIES: CPT

## 2020-02-21 PROCEDURE — 94760 N-INVAS EAR/PLS OXIMETRY 1: CPT

## 2020-02-21 PROCEDURE — 6360000002 HC RX W HCPCS: Performed by: INTERNAL MEDICINE

## 2020-02-21 PROCEDURE — 99233 SBSQ HOSP IP/OBS HIGH 50: CPT | Performed by: INTERNAL MEDICINE

## 2020-02-21 PROCEDURE — 6360000002 HC RX W HCPCS: Performed by: PSYCHIATRY & NEUROLOGY

## 2020-02-21 PROCEDURE — 2580000003 HC RX 258: Performed by: INTERNAL MEDICINE

## 2020-02-21 RX ORDER — LANOLIN ALCOHOL/MO/W.PET/CERES
1000 CREAM (GRAM) TOPICAL DAILY
Status: DISCONTINUED | OUTPATIENT
Start: 2020-02-23 | End: 2020-02-24 | Stop reason: HOSPADM

## 2020-02-21 RX ORDER — AMLODIPINE BESYLATE 10 MG/1
10 TABLET ORAL DAILY
Status: DISCONTINUED | OUTPATIENT
Start: 2020-02-21 | End: 2020-02-24 | Stop reason: HOSPADM

## 2020-02-21 RX ORDER — LANOLIN ALCOHOL/MO/W.PET/CERES
500 CREAM (GRAM) TOPICAL DAILY
Status: DISCONTINUED | OUTPATIENT
Start: 2020-02-23 | End: 2020-02-21

## 2020-02-21 RX ADMIN — ASPIRIN 81 MG 81 MG: 81 TABLET ORAL at 08:36

## 2020-02-21 RX ADMIN — ACETAMINOPHEN 650 MG: 325 TABLET ORAL at 11:51

## 2020-02-21 RX ADMIN — Medication 10 ML: at 20:12

## 2020-02-21 RX ADMIN — AMLODIPINE BESYLATE 10 MG: 10 TABLET ORAL at 08:36

## 2020-02-21 RX ADMIN — ATORVASTATIN CALCIUM 40 MG: 40 TABLET, FILM COATED ORAL at 20:12

## 2020-02-21 RX ADMIN — CLOPIDOGREL 75 MG: 75 TABLET, FILM COATED ORAL at 08:36

## 2020-02-21 RX ADMIN — ENOXAPARIN SODIUM 40 MG: 40 INJECTION SUBCUTANEOUS at 08:36

## 2020-02-21 RX ADMIN — CYANOCOBALAMIN 1000 MCG: 1000 INJECTION, SOLUTION INTRAMUSCULAR at 08:36

## 2020-02-21 RX ADMIN — ACETAMINOPHEN 650 MG: 325 TABLET ORAL at 04:33

## 2020-02-21 RX ADMIN — Medication 10 ML: at 08:37

## 2020-02-21 ASSESSMENT — PAIN DESCRIPTION - DESCRIPTORS
DESCRIPTORS: HEADACHE
DESCRIPTORS: HEADACHE

## 2020-02-21 ASSESSMENT — PAIN SCALES - GENERAL
PAINLEVEL_OUTOF10: 0
PAINLEVEL_OUTOF10: 3
PAINLEVEL_OUTOF10: 5

## 2020-02-21 ASSESSMENT — PAIN DESCRIPTION - FREQUENCY
FREQUENCY: INTERMITTENT
FREQUENCY: INTERMITTENT

## 2020-02-21 ASSESSMENT — PAIN DESCRIPTION - LOCATION
LOCATION: HEAD
LOCATION: HEAD

## 2020-02-21 ASSESSMENT — PAIN DESCRIPTION - PROGRESSION
CLINICAL_PROGRESSION: RESOLVED
CLINICAL_PROGRESSION: GRADUALLY WORSENING

## 2020-02-21 ASSESSMENT — PAIN - FUNCTIONAL ASSESSMENT: PAIN_FUNCTIONAL_ASSESSMENT: PREVENTS OR INTERFERES SOME ACTIVE ACTIVITIES AND ADLS

## 2020-02-21 ASSESSMENT — PAIN DESCRIPTION - PAIN TYPE
TYPE: ACUTE PAIN
TYPE: ACUTE PAIN

## 2020-02-21 ASSESSMENT — PAIN DESCRIPTION - ORIENTATION: ORIENTATION: MID

## 2020-02-21 ASSESSMENT — PAIN DESCRIPTION - ONSET: ONSET: AWAKENED FROM SLEEP

## 2020-02-21 NOTE — PROGRESS NOTES
Mary    OBJECTIVE:    Transfers:  Sit to Stand: Supervision  Stand to Sit:Supervision    Ambulation:  Stand By Assistance  Distance: 250' x 1  Surface: Level Tile  Device:No Device  Gait Deviations:  Decreased Step Length Bilaterally, Decreased Weight Shift Right and Mild Path Deviations      Exercise:  Patient was guided in  1 set(s) 10 reps of exercise to both lower extremities. Ankle pumps, Glut sets, Long arc quads, Hip abduction/adduction, Seated hip flexion, Seated heel/toe raises, Standing heel/toe raises, Standing marches, Standing hip abduction/adduction, Standing hip extension, Standing hamstring curls and Mini squats. Exercises were completed for increased independence with functional mobility. Functional Outcome Measures: Not completed       ASSESSMENT:  Assessment: Patient progressing toward established goals. Activity Tolerance:  Patient tolerance of  treatment: good. Equipment Recommendations:Equipment Needed: No  Discharge Recommendations:  Continue to assess pending progress    Plan: Times per week: 3-5X N  Times per day: Daily  Specific instructions for Next Treatment: amb, path finding, steps    Patient Education  Patient Education: Plan of Care, Transfers, Reviewed Prior Education, Gait, Verbal Exercise Instruction     Goals:  Patient goals : return home  Short term goals  Time Frame for Short term goals: by discharge  Short term goal 1: pt MOD I with amb without AD and able to perform path finding back to room  Short term goal 2: negotiate 4 steps HR and S to enter home safely  Long term goals  Time Frame for Long term goals : no LTGs set secondary to short ELOS    Following session, patient left in safe position with all fall risk precautions in place.

## 2020-02-21 NOTE — PLAN OF CARE
Problem: Falls - Risk of:  Goal: Will remain free from falls  Description  Will remain free from falls  2/21/2020 0055 by Jj Guajardo RN  Outcome: Ongoing  Note:   Call light in reach, bed in lowest position, and bed alarm activated. Education given on use of call light before ambulation and when in need of assistance. Patient expressed understanding. Hourly visual checks performed and charted. Toileting offered to patient. No falls this shift, at any time. Arm band and falling star in place. Will continue to monitor. Problem: Falls - Risk of:  Goal: Absence of physical injury  Description  Absence of physical injury  2/21/2020 0055 by Jj Guajardo RN  Outcome: Ongoing  Note:   Call light in reach, bed in lowest position, and bed alarm activated. Education given on use of call light before ambulation and when in need of assistance. Patient expressed understanding. Hourly visual checks performed and charted. Toileting offered to patient. No falls this shift, at any time. Arm band and falling star in place. Will continue to monitor. Problem: HEMODYNAMIC STATUS  Goal: Patient has stable vital signs and fluid balance  2/21/2020 0055 by Jj Guajardo RN  Outcome: Ongoing  Note:   Vitals:    02/20/20 1803 02/20/20 2008 02/20/20 2226 02/20/20 2331   BP: (!) 152/120 (!) 157/67  (!) 128/56   Pulse: 53 52  58   Resp: 14 16 16 18   Temp:  97.9 °F (36.6 °C)  97.6 °F (36.4 °C)   TempSrc:  Oral  Oral   SpO2:  98% 98% 100%   Weight:       Height:         Vital signs within normal range for patient. Will continue to monitor. Goal is -180. Problem: ACTIVITY INTOLERANCE/IMPAIRED MOBILITY  Goal: Mobility/activity is maintained at optimum level for patient  2/21/2020 0055 by Jj Guajardo RN  Outcome: Ongoing  Note:   Patient is up with standby assist. Patient tolerating well at this time.      Problem: COMMUNICATION IMPAIRMENT  Goal: Ability to express needs and understand communication  2/21/2020 0055 by Trang Goldman RN  Outcome: Ongoing  Note:   Patient presents with expressive aphasia. Patient can still communicate needs and understand. Problem: Neurological  Goal: Maximum potential motor/sensory/cognitive function  Outcome: Ongoing  Note:   Patient has right sided visual loss. Patient also has expressive aphasia. Will continue to monitor. Problem: Pain:  Goal: Patient's pain/discomfort is manageable  Description  Patient's pain/discomfort is manageable  Outcome: Ongoing  Note:   Patient able to use 0-10 pain scale. Denies pain at this time. Patient has a pain goal of \"no pain. \" Agreeable to take PRN pain medications. Problem: Skin Integrity:  Goal: Skin integrity will stabilize  Description  Skin integrity will stabilize  Outcome: Ongoing  Note:   No signs of skin breakdown. Skin warm, dry, and intact. Mucous membranes pink and moist.  Assistance with turns/ambulation provided PRN. Will continue to monitor. Problem: Discharge Planning:  Goal: Patients continuum of care needs are met  Description  Patients continuum of care needs are met  Outcome: Ongoing  Note:   Discharge planning in process and discussed with patient/family. Social work consulted for any additional needs. Care manager aware of discharge needs. Patient is from home with her  and plans to return there upon discharge. Care plan reviewed with patient and family. Patient and family verbalize understanding of the plan of care and contribute to goal setting.

## 2020-02-21 NOTE — PROGRESS NOTES
[address] [place] [address]  [facility]  Occupational Therapy  Daily Note  Time:    Time In: 3194  Time Out: 1104  Timed Code Treatment Minutes: 28 Minutes  Minutes: 32          Date: 2020  Patient Name: Zaire Reagan,   Gender: female      Room: -04/004-A  MRN: [id number]  : 1950  (81 y.o.)  Referring Practitioner: Joan Victor MD  Diagnosis: acute CVA  Additional Pertinent Hx: Per H&P: 81 y.o. female who presented to [address] [place] [address] with confusion and difficulty speaking. Pt has a pmh of prior CVA in , since then has never followed up with a doctor and has not been taking any medications. 2 days ago pt drove to get [employer], however couldn't remember how to get home shortly after. Owner of [employer] place had to call her  to come pick her up. Pt has also been having episodes of difficulty speaking over the past few days. CT head showing moderate size area of hypodensity left temporal lobe concerning for acute/subacute infarct. Restrictions/Precautions:  Restrictions/Precautions: General Precautions, Fall Risk    SUBJECTIVE: cooperative, distractable     PAIN: 0/10: no c/o pain during session    COGNITION: Slow Processing, Impulsive and Receptive Aphasia, R neglect    ADL:   Grooming: Stand By Assistance. stood at sink to brush teeth & brush hair-vcs for locating needed items on R side. BALANCE:  Standing Balance: Stand By Assistance. BED MOBILITY:  Not Tested    TRANSFERS:  Sit to Stand:  Contact Guard Assistance. from recliner, SBA from mat    FUNCTIONAL MOBILITY:  Assistive Device: None  Assist Level:  Stand By Assistance. Distance: To and from therapy gym  Tendency to bump into things on R side, educated Pt on safety & scanning to R side     ADDITIONAL ACTIVITIES:  Completed visual screen on this date demo  a R peripheral visual field cut in R eye; as well as some nystagmus with scanning.   Completed visual tracking task of scanning from 1 side (A) to 2nd side of board (1). Pt required visual demo before understanding directions then completed  X 4 times accurately. Did letter cancellation scanning L to R on large board with good accuracy after visual demo. Therapist to give Pt word find paper to work on for HEP. Pt tearful at one point during session, seemed to have increased insight & understanding into therapist's education on this date. ASSESSMENT:     Activity Tolerance:  Patient tolerance of  treatment: fair. Discharge Recommendations: 24 hour supervision or assist, Patient would benefit from continued therapy after discharge, Continue to assess pending progress  Equipment Recommendations: Other: will continue to monitor pending progress, discharge environment  Plan: Times per week: 5x  Current Treatment Recommendations: Strengthening, Balance Training, Functional Mobility Training, Endurance Training, Cognitive Reorientation, Safety Education & Training, Patient/Caregiver Education & Training, Equipment Evaluation, Education, & procurement, Self-Care / ADL, Home Management Training    Patient Education  Patient Education: see above    Goals  Short term goals  Time Frame for Short term goals: by discharge  Short term goal 1: Pt will safely navigate to/from bathroom and household distances with supervision to increase activity tolerance needed for ADL completion. Short term goal 2: Pt will complete BADL tasks with supervision to increase independence with self care tasks. Short term goal 3: Pt will complete IADL tasks with supervision and minimal cues for sequencing/problem solving to increase ability to complete homemaking tasks safely. Short term goal 4: Pt will tolerate dynamic standing >5 minutes with supervision in prep for showering tasks. Long term goals  Time Frame for Long term goals : not set due to ELOS    Following session, patient left in safe position with all fall risk precautions in place.

## 2020-02-21 NOTE — CARE COORDINATION
2/21/20, 3:22 PM    DISCHARGE ON GOING EVALUATION    Bob Taylor day: 2  Location: -04/004-A Reason for admit: Acute CVA (cerebrovascular accident) [facility]) [I63.9]   Procedure:   2/19 CT Head: Moderate-sized area of hypodensity in the posterior left temporal lobe extending into the occipital lobe is concerning for acute/early subacute infarct given the patient's symptoms. A neoplasm is considered less likely; Focal areas of encephalomalacia in the left frontal lobe, parietal lobe and occipital lobe as evidence for old infarct. Some of these are stable compared to 2014 2/19 CTA Head/Neck: Severe stenosis at the proximal M1 segment of left middle cerebral artery with minimal flow in the distal segments of the left middle cerebral artery; Complete stenosis at the P1 segment of the left posterior cerebral artery and severe stenosis at the P1 segment of the right posterior cerebral artery with complete occlusion at the P2 segment. There does appear to be some flow peripherally from collateral vessels; Mural plaque at the bilateral carotid bulbs with 26% stenosis on the left and no hemodynamically significant stenosis on the right by NASCET criteria; Ascending aortic aneurysm measuring up to 4.4 cm in greatest diameter  2/19 MRI Brain: Moderate to large acute infarct in the left posterior cerebral artery territory with smaller adjacent acute infarcts in the same territory; These are superimposed on areas of encephalomalacia from prior infarcts in the bilateral posterior cerebral artery territories and in the left middle cerebral artery territory with the posterior cerebral artery territory chronic infarcts having occurred in the interval since 2014; Mild chronic microvascular angiopathy; Markedly attenuated flow voids in the left middle cerebral artery and left posterior cerebral artery  2/19 CXR: No definite infiltrate  Treatment Plan of Care: Per Neurology: keep until Monday d/t chance of brain edema.

## 2020-02-21 NOTE — PROGRESS NOTES
Hospitalist Progress Note    Patient:  Gildardo Mccann      Unit/Bed:4B-04/004-A    YOB: 1950    MRN: [id number]       Acct: [de-identified]     PCP: Samantha Aparicio DO    Date of Admission: 2/19/2020    Assessment/Plan:    Acute ischemic CVA - left MCA M1 occlusion with resulting confusion, aphasia, hemianopsia/neglect; neuro on the case. 2/21 - now 48h post acute CVA so working towards BP goal of SBP < 130; on ASA, plavix, statin    Hypertensive urgency, essential HTN - permissive htn in acute post stroke phase, but now goal is < 130 so amlodipine increased to 10mg on 2/21/20    Encephalopathy due to CVA - notable cognition issues, consult to physiatry for rehab recommendations given poor cognition, weakness, right sided neglect    B12 deficiency - plan to switch to PO after 3 days of IM injections        Expected discharge date:  2/23-4? Disposition:    [] Home       [] TCU       [] Rehab       [] Psych       [] SNF       [] [facility]       [] Other-    Chief Complaint: Confusion    Hospital Course: From prior note: \"69 y.o. female who presented to [facility] with confusion and difficulty speaking. Pt has a pmh of prior CVA in 20104, since then has never followed up with a doctor and has not been taking any medications. 2 days ago pt drove to get [employer], however couldn't remember how to get home shortly after. Owner of [employer] place had to call her  to come pick her up. Pt has also been having episodes of difficulty speaking over the past few days. Denies any facial droop, extremity weakness or numbness. Pt was seen by a family physician on 11/2019 of which she refused to take an medications. Pt takes Flax seed oil at home only.      In the ED, /90. Troponin non elevated. No ECG changes of ischemia. CT head showing moderate size area of hypodensity left temporal lobe concerning for acute/subacute infarct.  \"    2/21 - adjust amlodipine for improved BP control, consult physiatry for rehab assistance     Subjective (past 24 hours): Strength feels improved today; she notes continued episodes of confusion and difficulty with works as well as some right sided neglect       Medications:  Reviewed    Infusion Medications   Scheduled Medications    amLODIPine  10 mg Oral Daily    [START ON 2/23/2020] vitamin B-12  1,000 mcg Oral Daily    cyanocobalamin  1,000 mcg Intramuscular Daily    aspirin  81 mg Oral Daily    atorvastatin  40 mg Oral Nightly    clopidogrel  75 mg Oral Daily    sodium chloride flush  10 mL Intravenous 2 times per day    enoxaparin  40 mg Subcutaneous Q24H     PRN Meds: sodium chloride flush, acetaminophen, acetaminophen, polyethylene glycol, promethazine, ondansetron      Intake/Output Summary (Last 24 hours) at 2/21/2020 1824  Last data filed at 2/21/2020 0335  Gross per 24 hour   Intake 310 ml   Output 0 ml   Net 310 ml       Diet:  DIET CARDIAC; Exam:  /68   Pulse 67   Temp 97.9 °F (36.6 °C) (Oral)   Resp 18   Ht 5' 2.99\" (1.6 m)   Wt 148 lb 13 oz (67.5 kg)   SpO2 97%   BMI 26.37 kg/m²     General appearance: No apparent distress, appears stated age and cooperative. HEENT: Pupils equal, round, and reactive to light. Conjunctivae/corneas clear. Neck: Supple, with full range of motion. No jugular venous distention. Trachea midline. Respiratory:  Normal respiratory effort. Clear to auscultation, bilaterally without Rales/Wheezes/Rhonchi. Cardiovascular: Regular rate and rhythm with normal S1/S2 without murmurs, rubs or gallops. Abdomen: Soft, non-tender, non-distended with normal bowel sounds. Musculoskeletal: passive and active ROM x 4 extremities.   Skin: Skin color, texture, turgor normal.    Neurologic:  Strength 4+/5 in RUE and RLE; sensation intact; speech clear when seen  Psychiatric: Alert and oriented, thought content appropriate  Capillary Refill: Brisk,< 3 seconds   Peripheral Pulses: +2 palpable, equal bilaterally Labs:   Recent Labs     02/19/20  1509   WBC 4.4*   HGB 12.6   HCT 38.8        Recent Labs     02/19/20  1509      K 4.1      CO2 23   BUN 11   CREATININE 0.9   CALCIUM 9.1     Recent Labs     02/19/20  1509   AST 14   ALT 8*   BILITOT 0.5   ALKPHOS 72     Recent Labs     02/19/20  1509   INR 1.09     No results for input(s): Flint Akash in the last 72 hours. No results for input(s): PROCAL in the last 72 hours. Microbiology:      Urinalysis:      Lab Results   Component Value Date    NITRU NEGATIVE 02/20/2020    BLOODU NEGATIVE 02/20/2020    SPECGRAV 1.010 02/19/2020    GLUCOSEU NEGATIVE 02/20/2020       Radiology:  MRI BRAIN W WO CONTRAST   Final Result       1. Moderate to large acute infarct in the left posterior cerebral artery territory with smaller adjacent acute infarcts in the same territory. 2. These are superimposed on areas of encephalomalacia from prior infarcts in the bilateral posterior cerebral artery territories and in the left middle cerebral artery territory with the posterior cerebral artery territory chronic infarcts having    occurred in the interval since 2014. 3. Mild chronic microvascular angiopathy. 4. Markedly attenuated flow voids in the left middle cerebral artery and left posterior cerebral artery. **This report has been created using voice recognition software. It may contain minor errors which are inherent in voice recognition technology. **         Final report electronically signed by Dr. Leonela Barrett MD on 2/20/2020 9:30 AM      CTA HEAD W WO CONTRAST   Final Result       1. Severe stenosis at the proximal M1 segment of left middle cerebral artery with minimal flow in the distal segments of the left middle cerebral artery. 2. Complete stenosis at the P1 segment of the left posterior cerebral artery and severe stenosis at the P1 segment of the right posterior cerebral artery with complete occlusion at the P2 segment. There does appear to be some flow peripherally from    collateral vessels. 3. Mural plaque at the bilateral carotid bulbs with 26% stenosis on the left and no hemodynamically significant stenosis on the right by NASCET criteria. 4. Ascending aortic aneurysm measuring up to 4.4 cm in greatest diameter. **This report has been created using voice recognition software. It may contain minor errors which are inherent in voice recognition technology. **      Final report electronically signed by Dr. Leonela Barrett MD on 2/20/2020 8:40 AM      CTA NECK W WO CONTRAST   Final Result       1. Severe stenosis at the proximal M1 segment of left middle cerebral artery with minimal flow in the distal segments of the left middle cerebral artery. 2. Complete stenosis at the P1 segment of the left posterior cerebral artery and severe stenosis at the P1 segment of the right posterior cerebral artery with complete occlusion at the P2 segment. There does appear to be some flow peripherally from    collateral vessels. 3. Mural plaque at the bilateral carotid bulbs with 26% stenosis on the left and no hemodynamically significant stenosis on the right by NASCET criteria. 4. Ascending aortic aneurysm measuring up to 4.4 cm in greatest diameter. **This report has been created using voice recognition software. It may contain minor errors which are inherent in voice recognition technology. **      Final report electronically signed by Dr. Leonela Barrett MD on 2/20/2020 8:40 AM      CT Head WO Contrast   Final Result       1. Moderate-sized area of hypodensity in the posterior left temporal lobe extending into the occipital lobe is concerning for acute/early subacute infarct given the patient's symptoms. A neoplasm is considered less likely. Recommend MRI of the brain with    contrast for further evaluation.    2. Focal areas of encephalomalacia in the left frontal lobe, parietal lobe and occipital

## 2020-02-21 NOTE — PROGRESS NOTES
Taylor Calhoun MD   40 mg at 02/21/20 9346     No Known Allergies    PHYSICAL EXAMINATION:    Vitals:   Patient Vitals for the past 4 hrs:   BP Temp Temp src Pulse Resp SpO2   02/21/20 0815 (!) 149/80 97.8 °F (36.6 °C) Oral 52 18 98 %           General:  pleasant in no acute distress. HEENT: No pallor or icterus. Neck supple. No Carotid bruits. Heart: S1 and S2 normal with regular rhythm. No murmur. GENERAL NEUROLOGIC EXAM     Mental Status: Awake alert and oriented to person place and time. Language is moderately abnormal for comprehension, repetition, naming and fluency. Recent and remote memory were abnormal.  Attention span and concentration were abnormal. Fund of knowledge was abnormal.      Cranial nerves:  Fundi were normal bilaterally. Visual fields right homonymous  hemianopsia. Pupils are equal regular and reactive to light. Extraocular movements are intact. Facial sensation was normal and symmetrical bilaterally. Left facial asymmetry. Hearing to finger rub normal bilaterally. Palate elevates symmetrically. Sternocleidomastoid and trapezius normal. The tongue protrudes midline without atrophy or fasciculations. Motor: Normal tone and bulk with no involuntary movements or pronator drift. Strength 4/5 in right upper and lower extremities. Sensation: Light touch, pin prick, vibration and joint position sense were normal in the upper and lower extremities.      Reflexes: 1+ bilaterally symmetrical with down going toes.     Coordination: Deferred due to weakness.      Gait: Deferred due to weakness            Labs:     CBC:   Recent Labs     02/19/20  1509   WBC 4.4*   HGB 12.6      MCV 84.2   MCH 27.3   MCHC 32.5     CMP:  Recent Labs     02/19/20  1509      K 4.1      CO2 23   BUN 11   CREATININE 0.9   LABGLOM 62*   GLUCOSE 100   CALCIUM 9.1     Liver:   Recent Labs     02/19/20  1509   AST 14   ALT 8*   ALKPHOS 72   PROT 6.9   LABALBU 4.2   BILITOT 0.5     Stroke Labs:   Recent Labs     02/19/20  1814   [id number] 180*   TSH 6.660*   LDLCALC 105   LABA1C 5.0       Imaging:  No results found for this or any previous visit. No results found for this or any previous visit. Results for orders placed during the hospital encounter of 02/19/20   CTA HEAD W WO CONTRAST    Narrative PROCEDURE: CTA HEAD W WO CONTRAST, CTA NECK W WO CONTRAST    CLINICAL INFORMATION: cva . Expressive aphasia for 3 days. COMPARISON: CT head from the same date. TECHNIQUE: Helical CT from the aortic arch through the head in arterial phase during intravenous administration of 80 mL Isovue-370 injected in left AC with multiplanar reformatted images to include volumetric maximum intensity projection sequences. FINDINGS:     CTA HEAD:  There are mural calcifications in the cavernous and clinoid segments of the internal carotid arteries with areas of mild stenosis. No aneurysm is identified in the intracranial segments of the internal carotid arteries. There is severe stenosis at the   proximal M1 segment of left middle cerebral artery with minimal flow distally. There is long segment of mild to moderate stenosis throughout the M1 segment of the right middle cerebral artery. There is distal flow in the periphery on the right. The   bilateral anterior cerebral arteries are patent without focal abnormality identified. The basilar artery is patent without focal stenosis or visualized aneurysm. There is complete occlusion at the P1 segment of the left posterior cerebral artery. There   is also severe stenosis at the P1 segment of the right posterior cerebral artery with complete occlusion at the P2 segment. There is distal flow in the occipital lobes from collateral vessels. Dural venous sinuses appear patent without focal filling   defect. No focal areas of abnormal parenchymal enhancement are identified. CTA NECK:  There is a typical 3 vessel arch.  The brachiocephalic and left subclavian arteries collateral vessels. 3. Mural plaque at the bilateral carotid bulbs with 26% stenosis on the left and no hemodynamically significant stenosis on the right by NASCET criteria. 4. Ascending aortic aneurysm measuring up to 4.4 cm in greatest diameter. **This report has been created using voice recognition software. It may contain minor errors which are inherent in voice recognition technology. **    Final report electronically signed by Dr. Brandyn Orellana MD on 2/20/2020 8:40 AM     Results for orders placed during the hospital encounter of 02/19/20   CTA NECK W WO CONTRAST    Narrative PROCEDURE: CTA HEAD W WO CONTRAST, CTA [address] INFORMATION: cva . Expressive aphasia for 3 days. COMPARISON: CT head from the same date. TECHNIQUE: Helical CT from the aortic arch through the head in arterial phase during intravenous administration of 80 mL Isovue-370 injected in left AC with multiplanar reformatted images to include volumetric maximum intensity projection sequences. FINDINGS:     CTA HEAD:  There are mural calcifications in the cavernous and clinoid segments of the internal carotid arteries with areas of mild stenosis. No aneurysm is identified in the intracranial segments of the internal carotid arteries. There is severe stenosis at the   proximal M1 segment of left middle cerebral artery with minimal flow distally. There is long segment of mild to moderate stenosis throughout the M1 segment of the right middle cerebral artery. There is distal flow in the periphery on the right. The   bilateral anterior cerebral arteries are patent without focal abnormality identified. The basilar artery is patent without focal stenosis or visualized aneurysm. There is complete occlusion at the P1 segment of the left posterior cerebral artery. There   is also severe stenosis at the P1 segment of the right posterior cerebral artery with complete occlusion at the P2 segment.  There is

## 2020-02-22 PROCEDURE — 6360000002 HC RX W HCPCS: Performed by: INTERNAL MEDICINE

## 2020-02-22 PROCEDURE — 2709999900 HC NON-CHARGEABLE SUPPLY

## 2020-02-22 PROCEDURE — 2060000000 HC ICU INTERMEDIATE R&B

## 2020-02-22 PROCEDURE — 2580000003 HC RX 258: Performed by: INTERNAL MEDICINE

## 2020-02-22 PROCEDURE — 6360000002 HC RX W HCPCS: Performed by: PSYCHIATRY & NEUROLOGY

## 2020-02-22 PROCEDURE — 6370000000 HC RX 637 (ALT 250 FOR IP): Performed by: INTERNAL MEDICINE

## 2020-02-22 PROCEDURE — 99232 SBSQ HOSP IP/OBS MODERATE 35: CPT | Performed by: INTERNAL MEDICINE

## 2020-02-22 RX ORDER — HYDROCHLOROTHIAZIDE 25 MG/1
12.5 TABLET ORAL DAILY
Status: DISCONTINUED | OUTPATIENT
Start: 2020-02-22 | End: 2020-02-23

## 2020-02-22 RX ADMIN — Medication 10 ML: at 08:17

## 2020-02-22 RX ADMIN — ENOXAPARIN SODIUM 40 MG: 40 INJECTION SUBCUTANEOUS at 08:16

## 2020-02-22 RX ADMIN — CLOPIDOGREL 75 MG: 75 TABLET, FILM COATED ORAL at 08:16

## 2020-02-22 RX ADMIN — ASPIRIN 81 MG 81 MG: 81 TABLET ORAL at 08:16

## 2020-02-22 RX ADMIN — AMLODIPINE BESYLATE 10 MG: 10 TABLET ORAL at 08:16

## 2020-02-22 RX ADMIN — ATORVASTATIN CALCIUM 40 MG: 40 TABLET, FILM COATED ORAL at 20:57

## 2020-02-22 RX ADMIN — CYANOCOBALAMIN 1000 MCG: 1000 INJECTION, SOLUTION INTRAMUSCULAR at 08:16

## 2020-02-22 RX ADMIN — HYDROCHLOROTHIAZIDE 12.5 MG: 25 TABLET ORAL at 12:49

## 2020-02-22 RX ADMIN — Medication 10 ML: at 20:57

## 2020-02-22 ASSESSMENT — PAIN SCALES - GENERAL
PAINLEVEL_OUTOF10: 0

## 2020-02-22 NOTE — CONSULTS
Patient seen and examined for CVA with a moderate to large acute infarct in the left posterior cerebral artery territory with smaller adjacent acute infarcts in the same territory involving the posterior medial temporal lobe; the lateral aspect of the left temporal lobe; and left occipital lobe and in the left thalamus. Deficits noted are waxing waxing expressive aphasia and mild receptive aphasia and RIGHT visual field deficit. Patient has been documented by PT to have ambulated 250' with no device and only presents with speech and occupational therapy needs. She would benefit from OP level OT and SLP therapies. Thank you for the consult.     Rayo Montoya MD

## 2020-02-22 NOTE — PLAN OF CARE
Problem: Falls - Risk of:  Goal: Will remain free from falls  Description  Will remain free from falls  Outcome: Ongoing  Note:   Call light in reach, bed in lowest position, and bed alarm activated. Education given on use of call light before ambulation and when in need of assistance. Patient expressed understanding. Hourly visual checks performed and charted. Toileting offered to patient. No falls this shift, at any time. Arm band and falling star in place. Will continue to monitor. Problem: Falls - Risk of:  Goal: Absence of physical injury  Description  Absence of physical injury  Outcome: Ongoing  Note:   Call light in reach, bed in lowest position, and bed alarm activated. Education given on use of call light before ambulation and when in need of assistance. Patient expressed understanding. Hourly visual checks performed and charted. Toileting offered to patient. No falls this shift, at any time. Arm band and falling star in place. Will continue to monitor. Problem: HEMODYNAMIC STATUS  Goal: Patient has stable vital signs and fluid balance  Outcome: Ongoing  Note:   Vitals:    02/21/20 1145 02/21/20 1436 02/21/20 1700 02/21/20 2007   BP: (!) 161/60 129/68 (!) 149/72 (!) 155/72   Pulse: (!) 48 67  57   Resp: 18   16   Temp: 97.9 °F (36.6 °C)  97.9 °F (36.6 °C) 97.9 °F (36.6 °C)   TempSrc: Oral  Oral Oral   SpO2: 97%  96% 98%   Weight:       Height:         Vital signs within normal range for patient. Will continue to monitor. Problem: ACTIVITY INTOLERANCE/IMPAIRED MOBILITY  Goal: Mobility/activity is maintained at optimum level for patient  Outcome: Ongoing  Note:   Patient is up with standby assist and tolerating well at this time. PT/OT continues working with patient. Problem: COMMUNICATION IMPAIRMENT  Goal: Ability to express needs and understand communication  Outcome: Ongoing  Note:   Patient presents with expressive aphasia.      Problem: Pain:  Goal: Patient's

## 2020-02-23 LAB
ANION GAP SERPL CALCULATED.3IONS-SCNC: 12 MEQ/L (ref 8–16)
BUN BLDV-MCNC: 12 MG/DL (ref 7–22)
CALCIUM SERPL-MCNC: 8.9 MG/DL (ref 8.5–10.5)
CHLORIDE BLD-SCNC: 101 MEQ/L (ref 98–111)
CO2: 23 MEQ/L (ref 23–33)
CREAT SERPL-MCNC: 0.7 MG/DL (ref 0.4–1.2)
ERYTHROCYTE [DISTWIDTH] IN BLOOD BY AUTOMATED COUNT: 14.6 % (ref 11.5–14.5)
ERYTHROCYTE [DISTWIDTH] IN BLOOD BY AUTOMATED COUNT: 44.8 FL (ref 35–45)
GFR SERPL CREATININE-BSD FRML MDRD: 83 ML/MIN/1.73M2
GLUCOSE BLD-MCNC: 96 MG/DL (ref 70–108)
HCT VFR BLD CALC: 39 % (ref 37–47)
HEMOGLOBIN: 12.6 GM/DL (ref 12–16)
MCH RBC QN AUTO: 27.6 PG (ref 26–33)
MCHC RBC AUTO-ENTMCNC: 32.3 GM/DL (ref 32.2–35.5)
MCV RBC AUTO: 85.3 FL (ref 81–99)
PLATELET # BLD: 125 THOU/MM3 (ref 130–400)
PMV BLD AUTO: 11.9 FL (ref 9.4–12.4)
POTASSIUM SERPL-SCNC: 3.5 MEQ/L (ref 3.5–5.2)
RBC # BLD: 4.57 MILL/MM3 (ref 4.2–5.4)
SODIUM BLD-SCNC: 136 MEQ/L (ref 135–145)
WBC # BLD: 4.3 THOU/MM3 (ref 4.8–10.8)

## 2020-02-23 PROCEDURE — 6360000002 HC RX W HCPCS: Performed by: INTERNAL MEDICINE

## 2020-02-23 PROCEDURE — 2580000003 HC RX 258: Performed by: INTERNAL MEDICINE

## 2020-02-23 PROCEDURE — 6370000000 HC RX 637 (ALT 250 FOR IP): Performed by: INTERNAL MEDICINE

## 2020-02-23 PROCEDURE — 2060000000 HC ICU INTERMEDIATE R&B

## 2020-02-23 PROCEDURE — 36415 COLL VENOUS BLD VENIPUNCTURE: CPT

## 2020-02-23 PROCEDURE — 94760 N-INVAS EAR/PLS OXIMETRY 1: CPT

## 2020-02-23 PROCEDURE — 2709999900 HC NON-CHARGEABLE SUPPLY

## 2020-02-23 PROCEDURE — 99232 SBSQ HOSP IP/OBS MODERATE 35: CPT | Performed by: INTERNAL MEDICINE

## 2020-02-23 PROCEDURE — 85027 COMPLETE CBC AUTOMATED: CPT

## 2020-02-23 PROCEDURE — 80048 BASIC METABOLIC PNL TOTAL CA: CPT

## 2020-02-23 RX ORDER — HYDROCHLOROTHIAZIDE 25 MG/1
25 TABLET ORAL DAILY
Status: DISCONTINUED | OUTPATIENT
Start: 2020-02-24 | End: 2020-02-24 | Stop reason: HOSPADM

## 2020-02-23 RX ADMIN — Medication 1000 MCG: at 08:22

## 2020-02-23 RX ADMIN — ASPIRIN 81 MG 81 MG: 81 TABLET ORAL at 08:22

## 2020-02-23 RX ADMIN — HYDROCHLOROTHIAZIDE 12.5 MG: 25 TABLET ORAL at 13:25

## 2020-02-23 RX ADMIN — ENOXAPARIN SODIUM 40 MG: 40 INJECTION SUBCUTANEOUS at 08:22

## 2020-02-23 RX ADMIN — AMLODIPINE BESYLATE 10 MG: 10 TABLET ORAL at 08:23

## 2020-02-23 RX ADMIN — CLOPIDOGREL 75 MG: 75 TABLET, FILM COATED ORAL at 08:22

## 2020-02-23 RX ADMIN — ATORVASTATIN CALCIUM 40 MG: 40 TABLET, FILM COATED ORAL at 21:22

## 2020-02-23 RX ADMIN — Medication 10 ML: at 08:25

## 2020-02-23 RX ADMIN — Medication 10 ML: at 21:22

## 2020-02-23 ASSESSMENT — PAIN SCALES - GENERAL
PAINLEVEL_OUTOF10: 0

## 2020-02-23 NOTE — PROGRESS NOTES
Hospitalist Progress Note    Patient:  Silvina Osuna      Unit/Bed:4B-04/004-A    YOB: 1950    MRN: [id number]       Acct: [de-identified]     PCP: Edmar Uribe DO    Date of Admission: 2/19/2020    Assessment/Plan:    Acute ischemic CVA - left MCA M1 occlusion with resulting confusion, aphasia, hemianopsia/neglect; neuro on the case. 2/21 - now 48h post acute CVA so working towards BP goal of SBP < 130; on ASA, plavix, statin; neurology recommends close monitoring until 2/24 to assure no late cerebral edema due to large area of brain involvement    Hypertensive urgency, essential HTN - permissive htn in acute post stroke phase, but now goal is < 130 so amlodipine increased to 10mg on 2/21/20, added HCTZ 12.5mg on 2/22  Plan to increase HCTZ to 25mg on 2/24 as still running high on 2/23    Encephalopathy due to CVA - notable cognition issues, consult to physiatry for rehab recommendations given poor cognition, weakness, right sided neglect, appreciate their expert evaluation    B12 deficiency - switch to PO after 3 days of IM injections        Expected discharge date:  2/24    Disposition:    [x] Home       [] TCU       [] Rehab       [] Psych       [] SNF       [] [facility]       [] Other-    Chief Complaint: Confusion    Hospital Course: From prior note: \"69 y.o. female who presented to [address] [place] [address] with confusion and difficulty speaking. Pt has a pmh of prior CVA in [address], since then has never followed up with a doctor and has not been taking any medications. 2 days ago pt drove to get [employer], however couldn't remember how to get home shortly after. 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 Pt has also been having episodes of difficulty speaking over the past few days. Denies any facial droop, extremity weakness or numbness. Pt was seen by a family physician on 11/2019 of which she refused to take an medications. Pt takes Flax seed oil at home only.    In the ED, /90. Troponin non elevated. No ECG changes of ischemia. CT head showing moderate size area of hypodensity left temporal lobe concerning for acute/subacute infarct. \"    2/21 - adjust amlodipine for improved BP control, consult physiatry for rehab assistance   2/22 - monitoring BP, added 12.5mg HCTZ  2/23 - BP still running high, will increase to 25mg HCTZ    Subjective (past 24 hours): Continues to improve, some emotional lability and crying but speech is better and weakness is nearly resolved      Medications:  Reviewed    Infusion Medications   Scheduled Medications    hydroCHLOROthiazide  12.5 mg Oral Daily    amLODIPine  10 mg Oral Daily    vitamin B-12  1,000 mcg Oral Daily    aspirin  81 mg Oral Daily    atorvastatin  40 mg Oral Nightly    clopidogrel  75 mg Oral Daily    sodium chloride flush  10 mL Intravenous 2 times per day    enoxaparin  40 mg Subcutaneous Q24H     PRN Meds: sodium chloride flush, acetaminophen, acetaminophen, polyethylene glycol, promethazine, ondansetron      Intake/Output Summary (Last 24 hours) at 2/23/2020 1856  Last data filed at 2/23/2020 1847  Gross per 24 hour   Intake 1695 ml   Output 1650 ml   Net 45 ml       Diet:  DIET CARDIAC; Exam:  BP (!) 142/76   Pulse 54   Temp 98.3 °F (36.8 °C) (Oral)   Resp 18   Ht 5' 2.99\" (1.6 m)   Wt 148 lb 13 oz (67.5 kg)   SpO2 98% Comment: No need for O2 per protocol. BMI 26.37 kg/m²     General appearance: No apparent distress, appears stated age and cooperative. HEENT: Pupils equal, round, and reactive to light. Conjunctivae/corneas clear. Neck: Supple, with full range of motion. No jugular venous distention. Trachea midline. Respiratory:  Normal respiratory effort. Clear to auscultation, bilaterally without Rales/Wheezes/Rhonchi. Cardiovascular: Regular rate and rhythm with normal S1/S2 without murmurs, rubs or gallops.   Abdomen: Soft, non-tender, non-distended with normal bowel

## 2020-02-23 NOTE — PROGRESS NOTES
Hospitalist Progress Note    Patient:  Sandra Kelly      Unit/Bed:4B-04/004-A    YOB: 1950    MRN: [id number]       Acct: [de-identified]     PCP: Shaggy Valentino DO    Date of Admission: 2/19/2020    Assessment/Plan:    Acute ischemic CVA - left MCA M1 occlusion with resulting confusion, aphasia, hemianopsia/neglect; neuro on the case. 2/21 - now 48h post acute CVA so working towards BP goal of SBP < 130; on ASA, plavix, statin; neurology recommends close monitoring until 2/24 to assure no late cerebral edema due to large area of brain involvement    Hypertensive urgency, essential HTN - permissive htn in acute post stroke phase, but now goal is < 130 so amlodipine increased to 10mg on 2/21/20, added HCTZ 12.5mg on 2/22    Encephalopathy due to CVA - notable cognition issues, consult to physiatry for rehab recommendations given poor cognition, weakness, right sided neglect, appreciate their expert evaluation    B12 deficiency - switch to PO after 3 days of IM injections        Expected discharge date:  2/24    Disposition:    [x] Home       [] TCU       [] Rehab       [] Psych       [] SNF       [] [facility]       [] Other-    Chief Complaint: Confusion    Hospital Course: From prior note: \"69 y.o. female who presented to [address] [place] [address] with confusion and difficulty speaking. Pt has a pmh of prior CVA in [address], since then has never followed up with a doctor and has not been taking any medications. 2 days ago pt drove to get [employer], however couldn't remember how to get home shortly after. 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 Pt has also been having episodes of difficulty speaking over the past few days. Denies any facial droop, extremity weakness or numbness. Pt was seen by a family physician on 11/2019 of which she refused to take an medications. Pt takes Flax seed oil at home only.      In the ED, /90. Troponin non elevated.  No ECG changes of Neurologic:  Strength 4+/5 in RUE and RLE; sensation intact; speech clear when seen  Psychiatric: Alert and oriented, thought content appropriate  Capillary Refill: Brisk,< 3 seconds   Peripheral Pulses: +2 palpable, equal bilaterally       Labs:   Recent Labs     02/23/20  0455   WBC 4.3*   HGB 12.6   HCT 39.0   *     Recent Labs     02/23/20  0455      K 3.5      CO2 23   BUN 12   CREATININE 0.7   CALCIUM 8.9     No results for input(s): AST, ALT, BILIDIR, BILITOT, ALKPHOS in the last 72 hours. No results for input(s): INR in the last 72 hours. No results for input(s): Erich Hippo in the last 72 hours. No results for input(s): PROCAL in the last 72 hours. Microbiology:      Urinalysis:      Lab Results   Component Value Date    NITRU NEGATIVE 02/20/2020    BLOODU NEGATIVE 02/20/2020    SPECGRAV 1.010 02/19/2020    GLUCOSEU NEGATIVE 02/20/2020       Radiology:  MRI BRAIN W WO CONTRAST   Final Result       1. Moderate to large acute infarct in the left posterior cerebral artery territory with smaller adjacent acute infarcts in the same territory. 2. These are superimposed on areas of encephalomalacia from prior infarcts in the bilateral posterior cerebral artery territories and in the left middle cerebral artery territory with the posterior cerebral artery territory chronic infarcts having    occurred in the interval since 2014. 3. Mild chronic microvascular angiopathy. 4. Markedly attenuated flow voids in the left middle cerebral artery and left posterior cerebral artery. **This report has been created using voice recognition software. It may contain minor errors which are inherent in voice recognition technology. **         Final report electronically signed by Dr. Patricia Edmond MD on 2/20/2020 9:30 AM      CTA HEAD W WO CONTRAST   Final Result       1.  Severe stenosis at the proximal M1 segment of left middle cerebral artery with minimal flow in the acute/early subacute infarct given the patient's symptoms. A neoplasm is considered less likely. Recommend MRI of the brain with    contrast for further evaluation. 2. Focal areas of encephalomalacia in the left frontal lobe, parietal lobe and occipital lobe as evidence for old infarct. Some of these are stable compared to 2014. **This report has been created using voice recognition software. It may contain minor errors which are inherent in voice recognition technology. **      Final report electronically signed by Dr. Dena Lam MD on 2/19/2020 3:13 PM      XR CHEST PORTABLE   Final Result   No definite infiltrate. **This report has been created using voice recognition software. It may contain minor errors which are inherent in voice recognition technology. **      Final report electronically signed by Dr. Azam Evans on 2/19/2020 3:18 PM          DVT prophylaxis: [x] Lovenox                                 [] SCDs                                 [] SQ Heparin                                 [] Encourage ambulation           [] Already on Anticoagulation     Code Status: Full Code    Tele:   [x] yes             [] no    Active Hospital Problems    Diagnosis Date Noted    Homonymous hemianopia, right [X63.945] 02/21/2020    B12 deficiency [E53.8] 02/21/2020    Essential hypertension [I10] 02/19/2020    Aphasia [R47.01] 02/19/2020    Acute CVA (cerebrovascular accident) [facility]) [I63.9] 11/05/2014       Electronically signed by Bakari Mai DO on 2/23/2020 at 7:31 AM

## 2020-02-23 NOTE — PLAN OF CARE
Problem: Falls - Risk of:  Goal: Will remain free from falls  Description  Will remain free from falls  2/23/2020 0052 by Margoth Bhatt RN  Outcome: Ongoing  Note:   No falls this shift. Patient is a fall risk due to recent stroke. Patient is very steady on her feet and gait is stable. Patient is a standby assist . Patient is alert and oriented and will use the call light appropriately. Bed alarm on. Hourly rounding performed. Problem: Falls - Risk of:  Goal: Absence of physical injury  Description  Absence of physical injury  2/23/2020 0052 by Margoth Bhatt RN  Outcome: Ongoing  Note:   No S/SX of injury this shift. Problem: HEMODYNAMIC STATUS  Goal: Patient has stable vital signs and fluid balance  2/23/2020 0052 by Margoth Bhatt RN  Outcome: Ongoing  Note:   Vitals:    02/22/20 1600 02/22/20 2000 02/22/20 2041 02/22/20 2351   BP: (!) 144/64  (!) 148/67 (!) 123/58   Pulse: 69 58 58    Resp: 16  16    Temp: 98 °F (36.7 °C)  98 °F (36.7 °C) 97.6 °F (36.4 °C)   TempSrc: Oral  Oral Oral   SpO2: 97%  97% 97%   Weight:       Height:         Vital signs stable. Patient is on continuous telemetry monitoring and is currently in NSR/SB. The goal for blood pressures is to maintain a SBP between 140's and 180. Will continue to monitor. Problem: ACTIVITY INTOLERANCE/IMPAIRED MOBILITY  Goal: Mobility/activity is maintained at optimum level for patient  2/23/2020 0885 by Margoth Bhatt RN  Outcome: Ongoing  Note:   Patient tolerating movement and ambulation just fine. Problem: COMMUNICATION IMPAIRMENT  Goal: Ability to express needs and understand communication  2/23/2020 0052 by Margoth Bhatt RN  Outcome: Ongoing  Note:   Patient experiencing some expressive aphagia. Problem: Neurological  Goal: Maximum potential motor/sensory/cognitive function  2/23/2020 0052 by Margoth Bhatt RN  Outcome: Ongoing  Note:   No motor deficits observed this shift.   Patient does have some

## 2020-02-23 NOTE — PLAN OF CARE
Problem: Falls - Risk of:  Goal: Will remain free from falls  Description  Will remain free from falls  Outcome: Met This Shift  Note:   Utilizes call light. Standby assist. Steady gait. No impulsiveness displayed. CVA. Chair alarm utilized  Goal: Absence of physical injury  Description  Absence of physical injury  Outcome: Met This Shift     Problem: HEMODYNAMIC STATUS  Goal: Patient has stable vital signs and fluid balance  Outcome: Met This Shift  Note:   BP controlled. Tolerated new anti-hypertensive without dropping BP out of range. Problem: ACTIVITY INTOLERANCE/IMPAIRED MOBILITY  Goal: Mobility/activity is maintained at optimum level for patient  Outcome: Met This Shift  Note:   Up to chair, chair alarm on      Problem: Pain:  Goal: Patient's pain/discomfort is manageable  Description  Patient's pain/discomfort is manageable  Outcome: Met This Shift  Note:   Denies pain. Pain scale reviewed  Goal: Pain level will decrease  Description  Pain level will decrease  Outcome: Met This Shift     Problem: Skin Integrity:  Goal: Skin integrity will stabilize  Description  Skin integrity will stabilize  Outcome: Met This Shift  Note:   No open areas, moves self. careplan reviewed with patient and family.  Verbalize understanding plan of care and contribute to goal setting

## 2020-02-24 VITALS
OXYGEN SATURATION: 98 % | HEIGHT: 63 IN | BODY MASS INDEX: 27.34 KG/M2 | SYSTOLIC BLOOD PRESSURE: 134 MMHG | RESPIRATION RATE: 14 BRPM | HEART RATE: 57 BPM | TEMPERATURE: 97.8 F | DIASTOLIC BLOOD PRESSURE: 76 MMHG | WEIGHT: 154.32 LBS

## 2020-02-24 PROCEDURE — 2580000003 HC RX 258: Performed by: INTERNAL MEDICINE

## 2020-02-24 PROCEDURE — 97116 GAIT TRAINING THERAPY: CPT

## 2020-02-24 PROCEDURE — 92507 TX SP LANG VOICE COMM INDIV: CPT

## 2020-02-24 PROCEDURE — 99239 HOSP IP/OBS DSCHRG MGMT >30: CPT | Performed by: INTERNAL MEDICINE

## 2020-02-24 PROCEDURE — 6370000000 HC RX 637 (ALT 250 FOR IP): Performed by: INTERNAL MEDICINE

## 2020-02-24 PROCEDURE — 97535 SELF CARE MNGMENT TRAINING: CPT

## 2020-02-24 PROCEDURE — 97530 THERAPEUTIC ACTIVITIES: CPT

## 2020-02-24 PROCEDURE — 94760 N-INVAS EAR/PLS OXIMETRY 1: CPT

## 2020-02-24 PROCEDURE — 93270 REMOTE 30 DAY ECG REV/REPORT: CPT

## 2020-02-24 PROCEDURE — 6360000002 HC RX W HCPCS: Performed by: INTERNAL MEDICINE

## 2020-02-24 RX ORDER — AMLODIPINE BESYLATE 10 MG/1
10 TABLET ORAL DAILY
Qty: 30 TABLET | Refills: 3 | Status: SHIPPED | OUTPATIENT
Start: 2020-02-25 | End: 2020-11-16 | Stop reason: SDUPTHER

## 2020-02-24 RX ORDER — CLOPIDOGREL BISULFATE 75 MG/1
75 TABLET ORAL DAILY
Qty: 30 TABLET | Refills: 3 | Status: SHIPPED | OUTPATIENT
Start: 2020-02-25 | End: 2020-11-16 | Stop reason: SDUPTHER

## 2020-02-24 RX ORDER — ATORVASTATIN CALCIUM 40 MG/1
40 TABLET, FILM COATED ORAL NIGHTLY
Qty: 30 TABLET | Refills: 3 | Status: SHIPPED | OUTPATIENT
Start: 2020-02-24 | End: 2020-11-16 | Stop reason: SDUPTHER

## 2020-02-24 RX ORDER — HYDROCHLOROTHIAZIDE 25 MG/1
25 TABLET ORAL DAILY
Qty: 30 TABLET | Refills: 3 | Status: SHIPPED | OUTPATIENT
Start: 2020-02-25 | End: 2020-11-16 | Stop reason: SDUPTHER

## 2020-02-24 RX ADMIN — HYDROCHLOROTHIAZIDE 25 MG: 25 TABLET ORAL at 09:35

## 2020-02-24 RX ADMIN — Medication 10 ML: at 09:38

## 2020-02-24 RX ADMIN — ASPIRIN 81 MG 81 MG: 81 TABLET ORAL at 09:35

## 2020-02-24 RX ADMIN — ENOXAPARIN SODIUM 40 MG: 40 INJECTION SUBCUTANEOUS at 09:35

## 2020-02-24 RX ADMIN — CLOPIDOGREL 75 MG: 75 TABLET, FILM COATED ORAL at 09:35

## 2020-02-24 RX ADMIN — AMLODIPINE BESYLATE 10 MG: 10 TABLET ORAL at 09:35

## 2020-02-24 RX ADMIN — Medication 1000 MCG: at 09:35

## 2020-02-24 ASSESSMENT — PAIN SCALES - GENERAL
PAINLEVEL_OUTOF10: 0
PAINLEVEL_OUTOF10: 0

## 2020-02-24 NOTE — FLOWSHEET NOTE
Pt is a 81year old female who was taking a walk with her therapist on 4b. Her  was in the room and we had a conversation. Pt is [employer]. Spiritual care will follow up, later when pt returns to the room. 02/24/20 0930   Encounter Summary   Services provided to: Patient   Referral/Consult From: [id number] No   Continue Visiting Yes  (2/24)   Complexity of Encounter Low   Length of Encounter 15 minutes   Spiritual/Anabaptist   Type Spiritual support   Assessment   (doing therapy/ not available )   Intervention Nurtured hope;Prayer   Outcome Engaged in conversation   Care Plan:  Continue spiritual and emotional care for patient and family. Including prayers.

## 2020-02-24 NOTE — CARE COORDINATION
2/24/20, 1:10 PM    DISCHARGE ON GOING EVALUATION    Agnieszka Bingham day: 5  Location: -04/004-A Reason for admit: Acute CVA (cerebrovascular accident) [facility]) [I63.9]   Procedure: na    2/19 CT Head: Moderate-sized area of hypodensity in the posterior left temporal lobe extending into the occipital lobe is concerning for acute/early subacute infarct given the patient's symptoms. A neoplasm is considered less likely; Focal areas of encephalomalacia in the left frontal lobe, parietal lobe and occipital lobe as evidence for old infarct. Some of these are stable compared to 2014 2/19 CTA Head/Neck: Severe stenosis at the proximal M1 segment of left middle cerebral artery with minimal flow in the distal segments of the left middle cerebral artery; Complete stenosis at the P1 segment of the left posterior cerebral artery and severe stenosis at the P1 segment of the right posterior cerebral artery with complete occlusion at the P2 segment.  There does appear to be some flow peripherally from collateral vessels; Mural plaque at the bilateral carotid bulbs with 26% stenosis on the left and no hemodynamically significant stenosis on the right by NASCET criteria; Ascending aortic aneurysm measuring up to 4.4 cm in greatest diameter  2/19 MRI Brain: Moderate to large acute infarct in the left posterior cerebral artery territory with smaller adjacent acute infarcts in the same territory; These are superimposed on areas of encephalomalacia from prior infarcts in the bilateral posterior cerebral artery territories and in the left middle cerebral artery territory with the posterior cerebral artery territory chronic infarcts having occurred in the interval since 2014; Mild chronic microvascular angiopathy; Markedly attenuated flow voids in the left middle cerebral artery and left posterior cerebral artery  2/19 CXR: No definite infiltrate    Treatment Plan of Care: neuro checks, alert/oriented x 4, baby ASA, Plavix,

## 2020-02-24 NOTE — PROCEDURES
30 day cardiac event monitor. Explained to patient prep and instructions. Patient and family understands. No further questions.   Ellyn, CCT

## 2020-02-24 NOTE — PROGRESS NOTES
[address] [place] [address]  INPATIENT PHYSICAL THERAPY  DAILY NOTE  [facility] CVICU 4B - 4B-04/004-A    Time In: 8026  Time Out: 4233  Timed Code Treatment Minutes: 23 Minutes  Minutes: 23          Date: 2020  Patient Name: Gildardo Mccann,  Gender:  female        MRN: [id number]  : 1950  (81 y.o.)     Referring Practitioner: Dr. Miriam Rivero  Diagnosis: acute CVA  Additional Pertinent Hx:  Per H&P: 81 y.o. female who presented to [address] [place] [address] with confusion and difficulty speaking. Pt has a pmh of prior CVA in , since then has never followed up with a doctor and has not been taking any medications. 2 days ago pt drove to get [employer], however couldn't remember how to get home shortly after. Owner of [employer] place had to call her  to come pick her up. Pt has also been having episodes of difficulty speaking over the past few days. CT head showing moderate size area of hypodensity left temporal lobe concerning for acute/subacute infarct. Prior Level of Function:  Lives With: Spouse(works during day-part time)  Type of Home: House  Home Layout: Two level, Able to Live on Main level with bedroom/bathroom  Home Access: Stairs to enter with rails  Entrance Stairs - Number of Steps: 4  Home Equipment: (none- reported could obtain cane/walker if needed)   Bathroom Shower/Tub: Tub/Shower unit  Bathroom Toilet: Standard  Bathroom Equipment: (none)    ADL Assistance: Independent  Homemaking Assistance: Independent  Ambulation Assistance: Independent  Transfer Assistance: Independent  Active : Yes  Additional Comments:  reported pt did have some residual expressive aphasia since prior CVA.  reported h/o RUE weakness initially after CVA although has resolved. Restrictions/Precautions:  Restrictions/Precautions: General Precautions, Fall Risk    SUBJECTIVE: RN approved session. Patient in bed upon arrival, agreed and cooperative for therapy. Spouse/S. O present during session. Patient and S.O educated on benefit of getting some follow up therapy after discharge, receptive and said they would think about it. Difficulty with word finding at times during session. PAIN: No pain reported. OBJECTIVE:    Transfers:  Sit to Stand: Supervision  Stand to Sit:Supervision    Ambulation:  Stand By Assistance, with verbal cues   Distance: 250 ft. X1   Surface: Level Tile  Device:No Device  Gait Deviations:  Slow Mary, Decreased Arm Swing, Decreased Trunk Rotation, Decreased Heel Strike Bilaterally, Mild Path Deviations and decreased visual scan to R side and surrounding environment, often trying to walk into other rooms not realizing they weren't her room. Stairs:  Stairs:  6\" steps. X 4 using Bilateral Handrails and Stand By Assistance. Balance:  Dynamic Standing Balance: Supervision while completing tinetti balance test.     Exercise:  Educated patient and spouse on standing HEP for home and to complete at [employer] for safety,. Exercises were completed for increased independence with functional mobility. Functional Outcome Measures: Completed  Balance Score: 16  Gait Score: 12  Tinetti Total Score: 28   Risk Indicators:  Less than/equal to 18 = high risk  19-23 Moderate risk  Greater than/equal to 24 = low risk    ASSESSMENT:  Assessment: Patient progressing toward established goals. Tolerated session fairly well, did note that patient demonstrated decreased awareness and visual scan to R side during ambulation, decreased coordination and weakness on RLE and often some difficulty with wording finding. Would benefit from additional skilled PT to increase strength, endurance, balance and safety for improved functional mobility. Activity Tolerance:  Patient tolerance of  treatment: good. Mild difficulty with word finding and decreased visual scan to R side.       Equipment Recommendations:Equipment Needed: No  Discharge Recommendations: home with assist, Outpatient PT to follow up     Plan: Times per week: 3-5X N  Times per day: Daily  Specific instructions for Next Treatment: amb, path finding, steps    Patient Education  Patient Education: Plan of Care, Home Exercise Program, Precautions/Restrictions, Family Education, [employer] Mobility, Transfers, Reviewed Prior Education, Gait, Stairs, safety, awareness to R side,  - Patient Verbalized Understanding, - Patient Requires Continued Education    Goals:  Patient goals : return home  Short term goals  Time Frame for Short term goals: by discharge  Short term goal 1: pt MOD I with amb without AD and able to perform path finding back to room  Short term goal 2: negotiate 4 steps HR and S to enter home safely  Long term goals  Time Frame for Long term goals : no LTGs set secondary to short ELOS    Following session, patient left in safe position with all fall risk precautions in place.

## 2020-02-24 NOTE — PLAN OF CARE
Problem: Falls - Risk of:  Goal: Will remain free from falls  Description  Will remain free from falls  Outcome: Ongoing  Note:   Pt remains free from falls this far in shift. Pt bed in lowest position with bed alarm on. Problem: Falls - Risk of:  Goal: Absence of physical injury  Description  Absence of physical injury  Outcome: Ongoing  Note:   No evidence of physical injury this far in shift. Problem: HEMODYNAMIC STATUS  Goal: Patient has stable vital signs and fluid balance  Outcome: Ongoing  Note:   Vital signs within normal limits. Problem: ACTIVITY INTOLERANCE/IMPAIRED MOBILITY  Goal: Mobility/activity is maintained at optimum level for patient  Outcome: Ongoing  Note:   Pt turns and repositions self. Pt ambulates with stand by assist.      Problem: COMMUNICATION IMPAIRMENT  Goal: Ability to express needs and understand communication  Outcome: Ongoing  Note:   Pt has expressive aphagia. Problem: Pain:  Goal: Patient's pain/discomfort is manageable  Description  Patient's pain/discomfort is manageable  Outcome: Ongoing  Note:   Pt denies pain this far in shift. Problem: Skin Integrity:  Goal: Skin integrity will stabilize  Description  Skin integrity will stabilize  Outcome: Ongoing  Note:   Pt skin intact. Pt turns and repositions self. Care plan reviewed with patient. Patient verbalize understanding of the plan of care and contribute to goal setting.

## 2020-02-24 NOTE — PROGRESS NOTES
for Long term goals : not set due to ELOS    Following session, patient left in safe position with all fall risk precautions in place.

## 2020-02-24 NOTE — DISCHARGE SUMMARY
known as:  PLAVIX  Take 1 tablet by mouth daily  Start taking on:  February 25, 2020     cyanocobalamin 1000 MCG tablet  Take 1 tablet by mouth daily  Start taking on:  February 25, 2020     hydroCHLOROthiazide 25 MG tablet  Commonly known as:  HYDRODIURIL  Take 1 tablet by mouth daily  Start taking on:  February 25, 2020        CHANGE how you take these medications    thiamine 100 MG tablet  Take 1 tablet by mouth daily. What changed:  how much to take        CONTINUE taking these medications    Acetaminophen 650 MG Tabs  Take 650 mg by mouth every 4 hours as needed. aspirin 81 MG tablet     CoQ10 100 MG Caps     Flaxseed Oil 1000 MG Caps     Handicap Placard Misc  by Does not apply route Expires 12/17/23     TART CHERRY ADVANCED PO     therapeutic multivitamin-minerals tablet  Take 1 tablet by mouth daily. STOP taking these medications    folic acid 1 MG tablet  Commonly known as:  FOLVITE     Omega-3 Krill Oil 500 MG Caps           Where to Get Your Medications      These medications were sent to [address]    Phone:  [phone]   · amLODIPine 10 MG tablet  · atorvastatin 40 MG tablet  · clopidogrel 75 MG tablet  · cyanocobalamin 1000 MCG tablet  · hydroCHLOROthiazide 25 MG tablet     Chief Complaint: Confusion    Hospital Course: From prior note: \"69 y.o. female who presented to [facility] with confusion and difficulty speaking. Pt has a pmh of prior CVA in 20104, since then has never followed up with a doctor and has not been taking any medications. 2 days ago pt drove to get [employer], however couldn't remember how to get home shortly after. 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 Pt has also been having episodes of difficulty speaking over the past few days. Denies any facial droop, extremity weakness or numbness.  Pt was seen by a family physician on 11/2019 of which she refused to take an medications. Pt takes Flax seed oil at home only.      In the ED, /90. Troponin non elevated. No ECG changes of ischemia. CT head showing moderate size area of hypodensity left temporal lobe concerning for acute/subacute infarct. \"    2/21 - adjust amlodipine for improved BP control, consult physiatry for rehab assistance   2/22 - monitoring BP, added 12.5mg HCTZ  2/23 - BP still running high, will increase to 25mg HCTZ  2/24--blood pressures have normalized patient is alert awake oriented x3 answers all questions appropriately really wanting to go home today agreeable for home health with PT OT nursing and aide      Intake/Output Summary (Last 24 hours) at 2/24/2020 1732  Last data filed at 2/24/2020 0938  Gross per 24 hour   Intake 810 ml   Output 1000 ml   Net -190 ml       Diet:  No diet orders on file    Exam:  /76   Pulse 57   Temp 97.8 °F (36.6 °C) (Oral)   Resp 14   Ht 5' 2.99\" (1.6 m)   Wt 154 lb 5.2 oz (70 kg)   SpO2 98%   BMI 27.34 kg/m²     General appearance: No apparent distress, appears stated age and cooperative. HEENT: Pupils equal, round, and reactive to light. Conjunctivae/corneas clear. Neck: Supple, with full range of motion. No jugular venous distention. Trachea midline. Respiratory:  Normal respiratory effort. Clear to auscultation, bilaterally without Rales/Wheezes/Rhonchi. Cardiovascular: Regular rate and rhythm with normal S1/S2 without murmurs, rubs or gallops. Abdomen: Soft, non-tender, non-distended with normal bowel sounds. Musculoskeletal: passive and active ROM x 4 extremities.   Skin: Skin color, texture, turgor normal.    Neurologic:  Strength 5-/5 in RUE and RLE; sensation intact; speech clear when seen  Psychiatric: Alert and oriented, thought content appropriate  Capillary Refill: Brisk,< 3 seconds   Peripheral Pulses: +2 palpable, equal bilaterally       Labs:   Recent Labs     02/23/20  0455   WBC peripherally from    collateral vessels. 3. Mural plaque at the bilateral carotid bulbs with 26% stenosis on the left and no hemodynamically significant stenosis on the right by NASCET criteria. 4. Ascending aortic aneurysm measuring up to 4.4 cm in greatest diameter. **This report has been created using voice recognition software. It may contain minor errors which are inherent in voice recognition technology. **      Final report electronically signed by Dr. Leonela Barrett MD on 2/20/2020 8:40 AM      CTA NECK W WO CONTRAST   Final Result       1. Severe stenosis at the proximal M1 segment of left middle cerebral artery with minimal flow in the distal segments of the left middle cerebral artery. 2. Complete stenosis at the P1 segment of the left posterior cerebral artery and severe stenosis at the P1 segment of the right posterior cerebral artery with complete occlusion at the P2 segment. There does appear to be some flow peripherally from    collateral vessels. 3. Mural plaque at the bilateral carotid bulbs with 26% stenosis on the left and no hemodynamically significant stenosis on the right by NASCET criteria. 4. Ascending aortic aneurysm measuring up to 4.4 cm in greatest diameter. **This report has been created using voice recognition software. It may contain minor errors which are inherent in voice recognition technology. **      Final report electronically signed by Dr. Leonela Barrett MD on 2/20/2020 8:40 AM      CT Head WO Contrast   Final Result       1. Moderate-sized area of hypodensity in the posterior left temporal lobe extending into the occipital lobe is concerning for acute/early subacute infarct given the patient's symptoms. A neoplasm is considered less likely. Recommend MRI of the brain with    contrast for further evaluation. 2. Focal areas of encephalomalacia in the left frontal lobe, parietal lobe and occipital lobe as evidence for old infarct. Some of these are stable compared to 2014. **This report has been created using voice recognition software. It may contain minor errors which are inherent in voice recognition technology. **      Final report electronically signed by Dr. Sonal Lozano MD on 2/19/2020 3:13 PM      XR CHEST PORTABLE   Final Result   No definite infiltrate. **This report has been created using voice recognition software. It may contain minor errors which are inherent in voice recognition technology. **      Final report electronically signed by Dr. Kimberli South on 2/19/2020 3:18 PM          DVT prophylaxis: [x] Lovenox                                 [] SCDs                                 [] SQ Heparin                                 [] Encourage ambulation           [] Already on Anticoagulation     Code Status: Prior    Tele:   [x] yes             [] no    Active Hospital Problems    Diagnosis Date Noted    Homonymous hemianopia, right [K81.617] 02/21/2020    B12 deficiency [E53.8] 02/21/2020    Essential hypertension [I10] 02/19/2020    Aphasia [R47.01] 02/19/2020    Acute CVA (cerebrovascular accident) [facility]) [I63.9] 11/05/2014       Electronically signed by Sp Walker MD on 2/24/2020 at 5:32 PM

## 2020-02-25 ENCOUNTER — TELEPHONE (OUTPATIENT)
Dept: FAMILY MEDICINE CLINIC | Age: 70
End: 2020-02-25

## 2020-02-25 NOTE — TELEPHONE ENCOUNTER
[facility] Transitions Initial Follow Up Call    Outreach made within 2 business days of discharge: Yes    Patient: Edward Lopez Patient : 1950   MRN: [id number]  Reason for Admission: There are no discharge diagnoses documented for the most recent discharge. Discharge Date: 20       Spoke with: no answer.  No VM set-up    Discharge department/facility: [facility]    TCM Interactive Patient Contact:      Scheduled appointment with PCP within 7-14 days    Follow Up  Future Appointments   Date Time Provider Dwaine Levy   3/5/2020  2:30 PM Sravanthi Ladd, [address] [place] [address]   2020  9:30 AM [facility]        Ngoc Smith LPN

## 2020-03-02 NOTE — TELEPHONE ENCOUNTER
Lora Kessler ( ok per HIPAA) called back to find out why a letter was sent. I explained that the letter was sent due to the number of calls to the patient to do a [facility] follow up. Lora Kessler states that the patient lost her phone for a while but has now found it.    Lora Kessler will have the patient call back when she wakes up

## 2020-03-02 NOTE — TELEPHONE ENCOUNTER
[facility] Transitions Initial Follow Up Call    Outreach made within 2 business days of discharge: Yes, see note below    Patient: Laurie Molina Patient : 1950   MRN: [id number]  Reason for Admission: There are no discharge diagnoses documented for the most recent discharge. Discharge Date: 20       Spoke with: Roberto Lewis    Discharge department/facility: [facility]    TCM Interactive Patient Contact:  Was patient able to fill all prescriptions: Yes  Was patient instructed to bring all medications to the follow-up visit: Yes  Is patient taking all medications as directed in the discharge summary?  Yes  Does patient understand their discharge instructions: Yes  Does patient have questions or concerns that need addressed prior to 7-14 day follow up office visit: no    Scheduled appointment with PCP within 7-14 days    Follow Up  Future Appointments   Date Time Provider Dwaine Levy   3/5/2020  2:30 PM Iman Arguelles, [address] [place] [address]   2020  9:30 AM Deidra Camilo MD [address], CMA ([address] [place] [address])

## 2020-03-05 ENCOUNTER — OFFICE VISIT (OUTPATIENT)
Dept: FAMILY MEDICINE CLINIC | Age: 70
End: 2020-03-05
Payer: MEDICARE

## 2020-03-05 VITALS
SYSTOLIC BLOOD PRESSURE: 130 MMHG | BODY MASS INDEX: 27.29 KG/M2 | TEMPERATURE: 97.9 F | DIASTOLIC BLOOD PRESSURE: 82 MMHG | WEIGHT: 154 LBS | HEIGHT: 63 IN | HEART RATE: 78 BPM | RESPIRATION RATE: 14 BRPM

## 2020-03-05 PROBLEM — R47.01 EXPRESSIVE APHASIA: Status: ACTIVE | Noted: 2020-03-05

## 2020-03-05 PROCEDURE — 99495 TRANSJ CARE MGMT MOD F2F 14D: CPT | Performed by: FAMILY MEDICINE

## 2020-03-05 ASSESSMENT — PATIENT HEALTH QUESTIONNAIRE - PHQ9
SUM OF ALL RESPONSES TO PHQ9 QUESTIONS 1 & 2: 0
2. FEELING DOWN, DEPRESSED OR HOPELESS: 0
SUM OF ALL RESPONSES TO PHQ QUESTIONS 1-9: 0
1. LITTLE INTEREST OR PLEASURE IN DOING THINGS: 0
SUM OF ALL RESPONSES TO PHQ QUESTIONS 1-9: 0

## 2020-03-05 NOTE — PROGRESS NOTES
Transition of Care Visit/Hospital Follow Up:      Samantha Lu is a 81 y.o. female that presents for Follow-Up from Hospital (stroke- will get mammo when have more money)        Date of Discharge:   2/24/20  Was patient contacted within 2 business days of discharge (see chart for documentation):  yes - multiple attempts were made within 2 business days and patient did not respond      Patient presents for hospital follow up. Patient recently hospitalized at [facility] for treatment of CVA. Symptoms prior to admission:  Confusion, aphasia     Hospital Course per DC Summary:    Assessment/Plan:     Acute ischemic CVA - left MCA M1 occlusion with resulting confusion, aphasia, hemianopsia/neglect; neuro on the case. 2/21 - now 48h post acute CVA so working towards BP goal of SBP < 130; on ASA, plavix, statin; neurology recommends close monitoring until 2/24 to assure no late cerebral edema due to large area of brain involvement     2/24--patient is awake alert oriented x3, ambulating in the hallway without any difficulty answering all questions appropriately all dressed up wanting to go home-okay for home health with PT OT     Hypertensive urgency, essential HTN - permissive htn in acute post stroke phase, but now goal is < 130 so amlodipine increased to 10mg on 2/21/20, added HCTZ 12.5mg on 2/22  Plan to increase HCTZ to 25mg on 2/24 as still running high on 2/23 2/24--blood pressures are better with the blood pressure medication adjustment made yesterday we will send her home on the new changes     Encephalopathy due to CVA - notable cognition issues, consult to physiatry for rehab recommendations given poor cognition, weakness, right sided neglect, appreciate their expert evaluation  -2/24-> Resolved       Medication changes at discharge:  Med list reconciled today    Clinical course since discharge:  'I'm feeling good'. Her  notes that her speech is back to normal.  No dysarthria.   Does note some mild

## 2020-04-09 ENCOUNTER — TELEPHONE (OUTPATIENT)
Dept: NEUROLOGY | Age: 70
End: 2020-04-09

## 2020-04-09 NOTE — TELEPHONE ENCOUNTER
Patient offered virtual video visit. Patient declined and requested to be seen in office. She will call back later to schedule.

## 2020-04-27 ENCOUNTER — TELEPHONE (OUTPATIENT)
Dept: NEUROLOGY | Age: 70
End: 2020-04-27

## 2020-11-03 PROBLEM — I63.9 CVA (CEREBRAL VASCULAR ACCIDENT) (HCC): Status: RESOLVED | Noted: 2020-11-03 | Resolved: 2020-11-03

## 2020-11-16 ENCOUNTER — TELEPHONE (OUTPATIENT)
Dept: FAMILY MEDICINE CLINIC | Age: 70
End: 2020-11-16

## 2020-11-16 RX ORDER — ATORVASTATIN CALCIUM 40 MG/1
40 TABLET, FILM COATED ORAL NIGHTLY
Qty: 90 TABLET | Refills: 3 | Status: SHIPPED | OUTPATIENT
Start: 2020-11-16

## 2020-11-16 RX ORDER — AMLODIPINE BESYLATE 10 MG/1
10 TABLET ORAL DAILY
Qty: 90 TABLET | Refills: 3 | Status: SHIPPED | OUTPATIENT
Start: 2020-11-16 | End: 2022-02-11

## 2020-11-16 RX ORDER — CLOPIDOGREL BISULFATE 75 MG/1
75 TABLET ORAL DAILY
Qty: 90 TABLET | Refills: 3 | Status: SHIPPED | OUTPATIENT
Start: 2020-11-16 | End: 2022-02-11

## 2020-11-16 RX ORDER — HYDROCHLOROTHIAZIDE 25 MG/1
25 TABLET ORAL DAILY
Qty: 90 TABLET | Refills: 3 | Status: SHIPPED | OUTPATIENT
Start: 2020-11-16 | End: 2022-02-11

## 2020-11-16 NOTE — TELEPHONE ENCOUNTER
Kelsy Solis called in and stated pt needs meds refilled and December is too far out, please follow up

## 2021-03-09 ENCOUNTER — OFFICE VISIT (OUTPATIENT)
Dept: FAMILY MEDICINE CLINIC | Age: 71
End: 2021-03-09
Payer: MEDICARE

## 2021-03-09 VITALS
WEIGHT: 136.9 LBS | SYSTOLIC BLOOD PRESSURE: 126 MMHG | BODY MASS INDEX: 24.26 KG/M2 | DIASTOLIC BLOOD PRESSURE: 82 MMHG | HEART RATE: 65 BPM | HEIGHT: 63 IN | TEMPERATURE: 96.7 F | RESPIRATION RATE: 16 BRPM | OXYGEN SATURATION: 98 %

## 2021-03-09 DIAGNOSIS — E78.5 DYSLIPIDEMIA: ICD-10-CM

## 2021-03-09 DIAGNOSIS — I69.90 LATE EFFECTS OF CVA (CEREBROVASCULAR ACCIDENT): ICD-10-CM

## 2021-03-09 DIAGNOSIS — I10 ESSENTIAL HYPERTENSION: ICD-10-CM

## 2021-03-09 DIAGNOSIS — Z00.00 ROUTINE GENERAL MEDICAL EXAMINATION AT A HEALTH CARE FACILITY: Primary | ICD-10-CM

## 2021-03-09 PROCEDURE — G0438 PPPS, INITIAL VISIT: HCPCS | Performed by: FAMILY MEDICINE

## 2021-03-09 SDOH — ECONOMIC STABILITY: TRANSPORTATION INSECURITY
IN THE PAST 12 MONTHS, HAS LACK OF TRANSPORTATION KEPT YOU FROM MEETINGS, WORK, OR FROM GETTING THINGS NEEDED FOR DAILY LIVING?: NO

## 2021-03-09 SDOH — ECONOMIC STABILITY: FOOD INSECURITY: WITHIN THE PAST 12 MONTHS, YOU WORRIED THAT YOUR FOOD WOULD RUN OUT BEFORE YOU GOT MONEY TO BUY MORE.: NEVER TRUE

## 2021-03-09 ASSESSMENT — PATIENT HEALTH QUESTIONNAIRE - PHQ9
SUM OF ALL RESPONSES TO PHQ QUESTIONS 1-9: 0
SUM OF ALL RESPONSES TO PHQ9 QUESTIONS 1 & 2: 0
2. FEELING DOWN, DEPRESSED OR HOPELESS: 0

## 2021-03-09 NOTE — PROGRESS NOTES
oLu Gooden is a 78 y.o. female that presents for     Chief Complaint   Patient presents with    Medicare AWV    Follow-up     1 yr         /82   Pulse 65   Temp 96.7 °F (35.9 °C)   Resp 16   Ht 5' 3\" (1.6 m)   Wt 136 lb 14.4 oz (62.1 kg)   SpO2 98%   BMI 24.25 kg/m²       HPI:    Late effects CVA:  States that her right leg is still a little weak. No falls recently. Does have some difficulty with fine motor manipulation of the hand. HTN    Does patient check BP regularly at home? - No  Current Medication regimen - norvasc, HCTZ  Tolerating medications well? - yes    Shortness of breath or chest pain? No  Headache or visual complaints? No  Neurologic changes like confusion? No  Extremity edema?  No    BP Readings from Last 3 Encounters:   03/09/21 126/82   03/05/20 130/82   02/24/20 134/76         Health Maintenance   Topic Date Due    Hepatitis C screen  Never done    COVID-19 Vaccine (1 of 2) Never done    DTaP/Tdap/Td vaccine (1 - Tdap) Never done    Shingles Vaccine (1 of 2) Never done    Colon cancer screen colonoscopy  Never done    DEXA (modify frequency per FRAX score)  Never done    Pneumococcal 65+ years Vaccine (1 of 1 - PPSV23) Never done    Breast cancer screen  07/06/2017    Annual Wellness Visit (AWV)  Never done    Flu vaccine (1) Never done    Potassium monitoring  02/23/2021    Creatinine monitoring  02/23/2021    Lipid screen  02/19/2021    Hepatitis A vaccine  Aged Out    Hepatitis B vaccine  Aged Out    Hib vaccine  Aged Out    Meningococcal (ACWY) vaccine  Aged Out       Past Medical History:   Diagnosis Date    [facility]     Stroke ([facility] 11/1/14    Unspecified cerebral artery occlusion with cerebral infarction        Past Surgical History:   Procedure Laterality Date    CATARACT REMOVAL         Social History     Tobacco Use    Smoking status: Never Smoker    Smokeless tobacco: Never Used   Substance Use Topics    Alcohol use: Yes     Comment: 4 Bud lights 5 days a week    Drug use: No       Family History   Problem Relation Age of Onset    Other Mother         CHF    Other Father         smoker--COPD    Stroke Sister 62         I have reviewed the patient's past medical history, past surgical history, allergies, medications, social and family history and I have made updates where appropriate. Review of Systems        PHYSICAL EXAM:  /82   Pulse 65   Temp 96.7 °F (35.9 °C)   Resp 16   Ht 5' 3\" (1.6 m)   Wt 136 lb 14.4 oz (62.1 kg)   SpO2 98%   BMI 24.25 kg/m²     General Appearance: well developed and well- nourished, in no acute distress  Head: normocephalic and atraumatic  ENT: external ear and ear canal normal bilaterally, nose without deformity, nasal mucosa and turbinates normal without polyps, oropharynx normal, dentition is normal for age, no lipor gum lesions noted  Neck: supple and non-tender without mass, no thyromegaly or thyroid nodules, no cervical lymphadenopathy  Pulmonary/Chest: clear to auscultation bilaterally- no wheezes, rales or rhonchi, normal air movement, no respiratory distress or retractions  Cardiovascular: normal rate, regular rhythm, normal S1 and S2, no murmurs, rubs, clicks, or gallops, distal pulses intact  Abdomen: soft, non-tender, non-distended, no rebound or guarding, no masses or hernias noted, no hepatospleenomegaly  Extremities: no cyanosis, clubbing or edema of the lower extremities  Psych:  Normal affect without evidence of depression oranxiety, insight and judgement are appropriate, memory appears intact  Skin: warm and dry, no rash or erythema      ASSESSMENT & PLAN  Wiliam Griffin was seen today for medicare awv and follow-up.     Diagnoses and all orders for this visit:    Acute CVA (cerebrovascular accident) [facility])    Essential hypertension    Dyslipidemia    Routine general medical examination at a health care facility        Return for Medicare Annual Wellness Visit in 1 year.      All copied or forwarded information in the progress note was verified by me to be accurate at the time of visit  Patient's past medical, surgical, social and family history were reviewed and updated     All patient questions answered. Patient voiced understanding.

## 2021-03-09 NOTE — PROGRESS NOTES
Medicare Annual Wellness Visit  Name: Leighann Saxena Date: 3/9/2021   MRN: [id number] Sex: Female   Age: 78 y.o. Ethnicity: Non-/Non    : 1950 Race: Toño Boudreaux is here for Medicare AWV and Follow-up (1 yr  )    Screenings for behavioral, psychosocial and functional/safety risks, and cognitive dysfunction are all negative except as indicated below. These results, as well as other patient data from the [address] form, are documented in Flowsheets linked to this Encounter. No Known Allergies      Prior to Visit Medications    Medication Sig Taking? Authorizing Provider   amLODIPine (NORVASC) 10 MG tablet Take 1 tablet by mouth daily Yes Lakeisha Chao DO   atorvastatin (LIPITOR) 40 MG tablet Take 1 tablet by mouth nightly Yes Lakeisha Chao DO   clopidogrel (PLAVIX) 75 MG tablet Take 1 tablet by mouth daily Yes Amilcar Griffin,    hydroCHLOROthiazide (HYDRODIURIL) 25 MG tablet Take 1 tablet by mouth daily Yes Lakeisha Chao,    vitamin B-12 1000 MCG tablet Take 1 tablet by mouth daily Yes Anil Zuñiga MD   Coenzyme Q10 (COQ10) 100 MG CAPS Take 100 mg by mouth daily Yes Historical Provider, MD   Flaxseed, Linseed, (FLAXSEED OIL) 1000 MG CAPS Take 1,000 mg by mouth daily Yes Historical Provider, MD   Misc Natural Products (TART CHERRY ADVANCED PO) Take 1,200 mg by mouth daily Yes Historical Provider, MD   aspirin 81 MG tablet Take 81 mg by mouth daily Yes Historical Provider, MD   Handicap Placard MISC by Does not apply route Expires 23 Yes Lakeisha Chao DO   acetaminophen 650 MG TABS Take 650 mg by mouth every 4 hours as needed. Yes Tyson Goldman MD   Multiple Vitamins-Minerals (THERAPEUTIC MULTIVITAMIN-MINERALS) tablet Take 1 tablet by mouth daily. Yes Tyson Goldman MD   vitamin B-1 100 MG tablet Take 1 tablet by mouth daily.   Patient taking differently: Take 250 mg by mouth daily  Yes Tyson Goldman MD         Past Medical History:   Diagnosis Date    visit:    Routine general medical examination at a health care facility    Essential hypertension  -     Comprehensive Metabolic Panel; Future  -     Lipid Panel; Future    Dyslipidemia  -     Comprehensive Metabolic Panel; Future  -     Lipid Panel;  Future    Late effects of CVA (cerebrovascular accident)

## 2021-03-09 NOTE — PATIENT INSTRUCTIONS
Personalized Preventive Plan for Giovanna Biggs - 3/9/2021  Medicare offers a range of preventive health benefits. Some of the tests and screenings are paid in full while other may be subject to a deductible, co-insurance, and/or copay. Some of these benefits include a comprehensive review of your medical history including lifestyle, illnesses that may run in your family, and various assessments and screenings as appropriate. After reviewing your medical record and screening and assessments performed today your provider may have ordered immunizations, labs, imaging, and/or referrals for you. A list of these orders (if applicable) as well as your Preventive Care list are included within your After Visit Summary for your review. Other Preventive Recommendations:    · A preventive eye exam performed by an eye specialist is recommended every 1-2 years to screen for glaucoma; cataracts, macular degeneration, and other eye disorders. · A preventive dental visit is recommended every 6 months. · Try to get at least 150 minutes of exercise per week or 10,000 steps per day on a pedometer . · Order or download the FREE \"Exercise & Physical Activity: Your Everyday Guide\" from [employer]. Call 3-815.586.8540 or search [employer] online. · You need 3484-2654 mg of calcium and 4631-7319 IU of vitamin D per day. It is possible to meet your calcium requirement with diet alone, but a vitamin D supplement is usually necessary to meet this goal.  · When exposed to the sun, use a sunscreen that protects against both UVA and UVB radiation with an SPF of 30 or greater. Reapply every 2 to 3 hours or after sweating, drying off with a towel, or swimming. · Always wear a seat belt when traveling in a car. Always wear a helmet when riding a bicycle or motorcycle.

## 2021-03-11 ENCOUNTER — IMMUNIZATION (OUTPATIENT)
Dept: PRIMARY CARE CLINIC | Age: 71
End: 2021-03-11
Payer: MEDICARE

## 2021-03-11 PROCEDURE — 0001A COVID-19, PFIZER VACCINE 30MCG/0.3ML DOSE: CPT | Performed by: FAMILY MEDICINE

## 2021-03-11 PROCEDURE — 91300 COVID-19, PFIZER VACCINE 30MCG/0.3ML DOSE: CPT | Performed by: FAMILY MEDICINE

## 2021-04-01 ENCOUNTER — IMMUNIZATION (OUTPATIENT)
Dept: PRIMARY CARE CLINIC | Age: 71
End: 2021-04-01
Payer: MEDICARE

## 2021-04-01 PROCEDURE — 91300 COVID-19, PFIZER VACCINE 30MCG/0.3ML DOSE: CPT | Performed by: FAMILY MEDICINE

## 2021-04-01 PROCEDURE — 0002A COVID-19, PFIZER VACCINE 30MCG/0.3ML DOSE: CPT | Performed by: FAMILY MEDICINE

## 2021-06-16 ENCOUNTER — HOSPITAL ENCOUNTER (EMERGENCY)
Age: 71
Discharge: HOME | End: 2021-06-16
Payer: MEDICARE

## 2021-06-16 VITALS
RESPIRATION RATE: 14 BRPM | BODY MASS INDEX: 23.32 KG/M2 | OXYGEN SATURATION: 100 % | HEIGHT: 65 IN | SYSTOLIC BLOOD PRESSURE: 135 MMHG | WEIGHT: 140 LBS | HEART RATE: 52 BPM | TEMPERATURE: 97.2 F | DIASTOLIC BLOOD PRESSURE: 62 MMHG

## 2021-06-16 DIAGNOSIS — L25.5 CONTACT DERMATITIS DUE TO PLANT: Primary | ICD-10-CM

## 2021-06-16 PROCEDURE — 99213 OFFICE O/P EST LOW 20 MIN: CPT

## 2021-06-16 PROCEDURE — 96372 THER/PROPH/DIAG INJ SC/IM: CPT

## 2021-06-16 PROCEDURE — 6360000002 HC RX W HCPCS: Performed by: NURSE PRACTITIONER

## 2021-06-16 PROCEDURE — 99202 OFFICE O/P NEW SF 15 MIN: CPT | Performed by: NURSE PRACTITIONER

## 2021-06-16 RX ORDER — METHYLPREDNISOLONE ACETATE 80 MG/ML
80 INJECTION, SUSPENSION INTRA-ARTICULAR; INTRALESIONAL; INTRAMUSCULAR; SOFT TISSUE ONCE
Status: COMPLETED | OUTPATIENT
Start: 2021-06-16 | End: 2021-06-16

## 2021-06-16 RX ORDER — PREDNISONE 20 MG/1
TABLET ORAL
Qty: 42 TABLET | Refills: 0 | Status: SHIPPED | OUTPATIENT
Start: 2021-06-16

## 2021-06-16 RX ADMIN — METHYLPREDNISOLONE ACETATE 80 MG: 80 INJECTION, SUSPENSION INTRA-ARTICULAR; INTRALESIONAL; INTRAMUSCULAR; SOFT TISSUE at 10:55

## 2021-06-16 ASSESSMENT — ENCOUNTER SYMPTOMS
COUGH: 0
SHORTNESS OF BREATH: 0

## 2021-06-16 NOTE — ED TRIAGE NOTES
TO room 1 c/o rash to face. Hasband answers most questions stating \" shes had strokes in the past and last one was the worst\" Pt has had covid vaccines Pfizer 3/11/21 and 4/1/21.  Covid card attached for scan to EHR

## 2021-06-16 NOTE — ED PROVIDER NOTES
[address]       Chief Complaint   Patient presents with    Rash     face \" She needs a shot to get rid of the poison ivy on her face\"       Nurses Notes reviewed and I agree except as noted in the HPI. HISTORY OF PRESENT ILLNESS   Patricio Hewitt is a 73 y.o. female who presents with poison ivy rash that started yesterday morning. Itchy all over face. Had been pulling weeds/gardening prior to the development of the rash. No medications/treatments since onset. COVID-19 vaccination series completed. HPI provided by the patient and her . REVIEW OF SYSTEMS     Review of Systems   Constitutional: Negative for chills and fever. Respiratory: Negative for cough and shortness of breath. Cardiovascular: Negative for chest pain. Skin: Positive for rash. Allergic/Immunologic: Negative for environmental allergies and food allergies. Neurological: Negative for headaches. PAST MEDICAL HISTORY         Diagnosis Date    [facility]     Stroke ([facility] 11/1/14    Unspecified cerebral artery occlusion with cerebral infarction        SURGICAL HISTORY     Patient  has a past surgical history that includes Cataract removal.    CURRENT MEDICATIONS       Previous Medications    ACETAMINOPHEN 650 MG TABS    Take 650 mg by mouth every 4 hours as needed.     AMLODIPINE (NORVASC) 10 MG TABLET    Take 1 tablet by mouth daily    ASPIRIN 81 MG TABLET    Take 81 mg by mouth daily    ATORVASTATIN (LIPITOR) 40 MG TABLET    Take 1 tablet by mouth nightly    CLOPIDOGREL (PLAVIX) 75 MG TABLET    Take 1 tablet by mouth daily    COENZYME Q10 (COQ10) 100 MG CAPS    Take 100 mg by mouth daily    FLAXSEED, LINSEED, (FLAXSEED OIL) 1000 MG CAPS    Take 1,000 mg by mouth daily    HANDICAP PLACARD MISC    by Does not apply route Expires 12/17/23    HYDROCHLOROTHIAZIDE (HYDRODIURIL) 25 MG TABLET    Take 1 tablet by mouth daily    MISC NATURAL [address]  [phone]    Schedule an appointment as soon as possible for a visit in 3 days  For further evaluation. , If symptoms change/worsen, go to the [facility]  [address] [facility]  [phone]    as needed, If symptoms change/worsen, go to the [address] [facility], [address]ierrez, APRN - CNP  06/16/21 105

## 2022-02-11 RX ORDER — AMLODIPINE BESYLATE 10 MG/1
TABLET ORAL
Qty: 90 TABLET | Refills: 3 | Status: SHIPPED | OUTPATIENT
Start: 2022-02-11

## 2022-02-11 RX ORDER — CLOPIDOGREL BISULFATE 75 MG/1
TABLET ORAL
Qty: 90 TABLET | Refills: 3 | Status: SHIPPED | OUTPATIENT
Start: 2022-02-11

## 2022-02-11 RX ORDER — HYDROCHLOROTHIAZIDE 25 MG/1
TABLET ORAL
Qty: 90 TABLET | Refills: 3 | Status: SHIPPED | OUTPATIENT
Start: 2022-02-11

## 2023-07-22 ENCOUNTER — HOSPITAL ENCOUNTER (INPATIENT)
Age: 73
LOS: 1 days | Discharge: HOME | DRG: 086 | End: 2023-07-23
Attending: EMERGENCY MEDICINE | Admitting: SURGERY
Payer: MEDICARE

## 2023-07-22 ENCOUNTER — APPOINTMENT (OUTPATIENT)
Dept: CT IMAGING | Age: 73
DRG: 086 | End: 2023-07-22
Payer: MEDICARE

## 2023-07-22 DIAGNOSIS — S00.03XA HEMATOMA OF SCALP, INITIAL ENCOUNTER: ICD-10-CM

## 2023-07-22 DIAGNOSIS — I61.9 INTRAPARENCHYMAL HEMORRHAGE OF BRAIN (HCC): Primary | ICD-10-CM

## 2023-07-22 DIAGNOSIS — F10.920 ACUTE ALCOHOLIC INTOXICATION WITHOUT COMPLICATION (HCC): ICD-10-CM

## 2023-07-22 PROBLEM — B88.8 INFESTATION BY BED BUG: Status: ACTIVE | Noted: 2023-07-22
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
## 2023-07-22 LAB
ABO: NORMAL
ALBUMIN SERPL BCG-MCNC: 4.7 G/DL (ref 3.5–5.1)
ALP SERPL-CCNC: 96 U/L (ref 38–126)
ALT SERPL W/O P-5'-P-CCNC: 9 U/L (ref 11–66)
AMPHETAMINES UR QL SCN: NEGATIVE
ANION GAP SERPL CALC-SCNC: 15 MEQ/L (ref 8–16)
ANTIBODY SCREEN: NORMAL
APTT PPP: 36.3 SECONDS (ref 22–38)
AST SERPL-CCNC: 15 U/L (ref 5–40)
BARBITURATES UR QL SCN: NEGATIVE
BASOPHILS ABSOLUTE: 0 THOU/MM3 (ref 0–0.1)
BASOPHILS NFR BLD AUTO: 0.7 %
BENZODIAZ UR QL SCN: NEGATIVE
BILIRUB SERPL-MCNC: 0.5 MG/DL (ref 0.3–1.2)
BILIRUB UR QL STRIP: NEGATIVE
BUN SERPL-MCNC: 12 MG/DL (ref 7–22)
BZE UR QL SCN: NEGATIVE
CALCIUM SERPL-MCNC: 9.2 MG/DL (ref 8.5–10.5)
CANNABINOIDS UR QL SCN: NEGATIVE
CHARACTER UR: CLEAR
CHLORIDE SERPL-SCNC: 95 MEQ/L (ref 98–111)
CO2 SERPL-SCNC: 25 MEQ/L (ref 23–33)
COLOR UR: YELLOW
CREAT SERPL-MCNC: 0.8 MG/DL (ref 0.4–1.2)
DEPRECATED RDW RBC AUTO: 41.1 FL (ref 35–45)
EOSINOPHIL NFR BLD AUTO: 3.5 %
EOSINOPHILS ABSOLUTE: 0.2 THOU/MM3 (ref 0–0.4)
ERYTHROCYTE [DISTWIDTH] IN BLOOD BY AUTOMATED COUNT: 14 % (ref 11.5–14.5)
ETHANOL SERPL-MCNC: 0.08 %
ETHANOL SERPL-MCNC: < 0.01 %
FENTANYL: NEGATIVE
GFR SERPL CREATININE-BSD FRML MDRD: > 60 ML/MIN/1.73M2
GLUCOSE SERPL-MCNC: 87 MG/DL (ref 70–108)
GLUCOSE UR QL STRIP.AUTO: NEGATIVE MG/DL
HCT VFR BLD AUTO: 40.6 % (ref 37–47)
HGB BLD-MCNC: 13.5 GM/DL (ref 12–16)
HGB UR QL STRIP.AUTO: NEGATIVE
IMM GRANULOCYTES # BLD AUTO: 0.02 THOU/MM3 (ref 0–0.07)
IMM GRANULOCYTES NFR BLD AUTO: 0.4 %
INR PPP: 1 (ref 0.85–1.13)
KETONES UR QL STRIP.AUTO: NEGATIVE
LACTATE SERPL-SCNC: 1.9 MMOL/L (ref 0.5–2)
LEUKOCYTE ESTERASE UR QL STRIP.AUTO: NEGATIVE
LYMPHOCYTES ABSOLUTE: 1.5 THOU/MM3 (ref 1–4.8)
LYMPHOCYTES NFR BLD AUTO: 27.5 %
MAGNESIUM SERPL-MCNC: 2.4 MG/DL (ref 1.6–2.4)
MCH RBC QN AUTO: 27.3 PG (ref 26–33)
MCHC RBC AUTO-ENTMCNC: 33.3 GM/DL (ref 32.2–35.5)
MCV RBC AUTO: 82 FL (ref 81–99)
MONOCYTES ABSOLUTE: 0.4 THOU/MM3 (ref 0.4–1.3)
MONOCYTES NFR BLD AUTO: 7.8 %
MRSA DNA SPEC QL NAA+PROBE: NEGATIVE
NEUTROPHILS NFR BLD AUTO: 60.1 %
NITRITE UR QL STRIP.AUTO: NEGATIVE
NRBC BLD AUTO-RTO: 0 /100 WBC
OPIATES UR QL SCN: NEGATIVE
OSMOLALITY SERPL CALC.SUM OF ELEC: 269.2 MOSMOL/KG (ref 275–300)
OXYCODONE, OPI5M: NEGATIVE
PCP UR QL SCN: NEGATIVE
PH UR STRIP.AUTO: 5.5 [PH] (ref 5–9)
PHOSPHATE SERPL-MCNC: 3.8 MG/DL (ref 2.4–4.7)
PLATELET # BLD AUTO: 182 THOU/MM3 (ref 130–400)
PMV BLD AUTO: 11.4 FL (ref 9.4–12.4)
POTASSIUM SERPL-SCNC: 3.4 MEQ/L (ref 3.5–5.2)
POTASSIUM SERPL-SCNC: 4.3 MEQ/L (ref 3.5–5.2)
PROT SERPL-MCNC: 7.9 G/DL (ref 6.1–8)
PROT UR STRIP.AUTO-MCNC: NEGATIVE MG/DL
RBC # BLD AUTO: 4.95 MILL/MM3 (ref 4.2–5.4)
REASON FOR REJECTION: NORMAL
REJECTED TEST: NORMAL
RH FACTOR: NORMAL
SEGMENTED NEUTROPHILS ABSOLUTE COUNT: 3.2 THOU/MM3 (ref 1.8–7.7)
SODIUM SERPL-SCNC: 135 MEQ/L (ref 135–145)
SP GR UR REFRACT.AUTO: 1.01 (ref 1–1.03)
UROBILINOGEN UR QL STRIP.AUTO: 0.2 EU/DL (ref 0–1)
WBC # BLD AUTO: 5.4 THOU/MM3 (ref 4.8–10.8)

## 2023-07-22 PROCEDURE — 6370000000 HC RX 637 (ALT 250 FOR IP): Performed by: NURSE PRACTITIONER

## 2023-07-22 PROCEDURE — 93005 ELECTROCARDIOGRAM TRACING: CPT | Performed by: NURSE PRACTITIONER

## 2023-07-22 PROCEDURE — 83735 ASSAY OF MAGNESIUM: CPT

## 2023-07-22 PROCEDURE — 99285 EMERGENCY DEPT VISIT HI MDM: CPT

## 2023-07-22 PROCEDURE — 86901 BLOOD TYPING SEROLOGIC RH(D): CPT

## 2023-07-22 PROCEDURE — 86850 RBC ANTIBODY SCREEN: CPT

## 2023-07-22 PROCEDURE — 99291 CRITICAL CARE FIRST HOUR: CPT | Performed by: INTERNAL MEDICINE

## 2023-07-22 PROCEDURE — 80307 DRUG TEST PRSMV CHEM ANLYZR: CPT

## 2023-07-22 PROCEDURE — 92610 EVALUATE SWALLOWING FUNCTION: CPT

## 2023-07-22 PROCEDURE — 6360000004 HC RX CONTRAST MEDICATION: Performed by: EMERGENCY MEDICINE

## 2023-07-22 PROCEDURE — 6370000000 HC RX 637 (ALT 250 FOR IP): Performed by: EMERGENCY MEDICINE

## 2023-07-22 PROCEDURE — 85025 COMPLETE CBC W/AUTO DIFF WBC: CPT

## 2023-07-22 PROCEDURE — 83605 ASSAY OF LACTIC ACID: CPT

## 2023-07-22 PROCEDURE — 74177 CT ABD & PELVIS W/CONTRAST: CPT

## 2023-07-22 PROCEDURE — 82077 ASSAY SPEC XCP UR&BREATH IA: CPT

## 2023-07-22 PROCEDURE — 76376 3D RENDER W/INTRP POSTPROCES: CPT

## 2023-07-22 PROCEDURE — 99222 1ST HOSP IP/OBS MODERATE 55: CPT | Performed by: NEUROLOGICAL SURGERY

## 2023-07-22 PROCEDURE — 84132 ASSAY OF SERUM POTASSIUM: CPT

## 2023-07-22 PROCEDURE — 2580000003 HC RX 258: Performed by: NURSE PRACTITIONER

## 2023-07-22 PROCEDURE — 87641 MR-STAPH DNA AMP PROBE: CPT

## 2023-07-22 PROCEDURE — 94761 N-INVAS EAR/PLS OXIMETRY MLT: CPT

## 2023-07-22 PROCEDURE — 36415 COLL VENOUS BLD VENIPUNCTURE: CPT

## 2023-07-22 PROCEDURE — 2100000000 HC CCU R&B

## 2023-07-22 PROCEDURE — 86900 BLOOD TYPING SEROLOGIC ABO: CPT

## 2023-07-22 PROCEDURE — 80053 COMPREHEN METABOLIC PANEL: CPT

## 2023-07-22 PROCEDURE — 2500000003 HC RX 250 WO HCPCS: Performed by: EMERGENCY MEDICINE

## 2023-07-22 PROCEDURE — 6820000002 HC L2 INJURY CALL ACTIVATION: Performed by: SURGERY

## 2023-07-22 PROCEDURE — 85730 THROMBOPLASTIN TIME PARTIAL: CPT

## 2023-07-22 PROCEDURE — 70450 CT HEAD/BRAIN W/O DYE: CPT

## 2023-07-22 PROCEDURE — 85610 PROTHROMBIN TIME: CPT

## 2023-07-22 PROCEDURE — 72125 CT NECK SPINE W/O DYE: CPT

## 2023-07-22 PROCEDURE — 87070 CULTURE OTHR SPECIMN AEROBIC: CPT

## 2023-07-22 PROCEDURE — 81003 URINALYSIS AUTO W/O SCOPE: CPT

## 2023-07-22 PROCEDURE — 71260 CT THORAX DX C+: CPT

## 2023-07-22 PROCEDURE — 84100 ASSAY OF PHOSPHORUS: CPT

## 2023-07-22 PROCEDURE — 6360000002 HC RX W HCPCS: Performed by: NURSE PRACTITIONER

## 2023-07-22 PROCEDURE — 92523 SPEECH SOUND LANG COMPREHEN: CPT

## 2023-07-22 RX ORDER — ACETAMINOPHEN 325 MG/1
650 TABLET ORAL EVERY 4 HOURS PRN
Status: DISCONTINUED | OUTPATIENT
Start: 2023-07-22 | End: 2023-07-23 | Stop reason: HOSPADM

## 2023-07-22 RX ORDER — FOLIC ACID 1 MG/1
1 TABLET ORAL DAILY
Status: DISCONTINUED | OUTPATIENT
Start: 2023-07-22 | End: 2023-07-23 | Stop reason: HOSPADM

## 2023-07-22 RX ORDER — LANOLIN ALCOHOL/MO/W.PET/CERES
100 CREAM (GRAM) TOPICAL DAILY
Status: DISCONTINUED | OUTPATIENT
Start: 2023-07-22 | End: 2023-07-23 | Stop reason: HOSPADM

## 2023-07-22 RX ORDER — MORPHINE SULFATE 4 MG/ML
4 INJECTION, SOLUTION INTRAMUSCULAR; INTRAVENOUS
Status: DISCONTINUED | OUTPATIENT
Start: 2023-07-22 | End: 2023-07-23 | Stop reason: HOSPADM

## 2023-07-22 RX ORDER — MULTIVITAMIN WITH IRON
1 TABLET ORAL DAILY
Status: DISCONTINUED | OUTPATIENT
Start: 2023-07-22 | End: 2023-07-23 | Stop reason: HOSPADM

## 2023-07-22 RX ORDER — ONDANSETRON 2 MG/ML
4 INJECTION INTRAMUSCULAR; INTRAVENOUS EVERY 6 HOURS PRN
Status: DISCONTINUED | OUTPATIENT
Start: 2023-07-22 | End: 2023-07-23 | Stop reason: HOSPADM

## 2023-07-22 RX ORDER — GINSENG 100 MG
CAPSULE ORAL 3 TIMES DAILY
Status: DISCONTINUED | OUTPATIENT
Start: 2023-07-22 | End: 2023-07-23 | Stop reason: HOSPADM

## 2023-07-22 RX ORDER — MORPHINE SULFATE 2 MG/ML
2 INJECTION, SOLUTION INTRAMUSCULAR; INTRAVENOUS
Status: DISCONTINUED | OUTPATIENT
Start: 2023-07-22 | End: 2023-07-23 | Stop reason: HOSPADM

## 2023-07-22 RX ORDER — TRANEXAMIC ACID 10 MG/ML
1000 INJECTION, SOLUTION INTRAVENOUS ONCE
Status: COMPLETED | OUTPATIENT
Start: 2023-07-22 | End: 2023-07-22

## 2023-07-22 RX ORDER — SODIUM CHLORIDE 0.9 % (FLUSH) 0.9 %
5-40 SYRINGE (ML) INJECTION EVERY 12 HOURS SCHEDULED
Status: DISCONTINUED | OUTPATIENT
Start: 2023-07-22 | End: 2023-07-23 | Stop reason: HOSPADM

## 2023-07-22 RX ORDER — POLYETHYLENE GLYCOL 3350 17 G/17G
17 POWDER, FOR SOLUTION ORAL DAILY
Status: DISCONTINUED | OUTPATIENT
Start: 2023-07-22 | End: 2023-07-23 | Stop reason: HOSPADM

## 2023-07-22 RX ORDER — POTASSIUM CHLORIDE 29.8 MG/ML
20 INJECTION INTRAVENOUS PRN
Status: DISCONTINUED | OUTPATIENT
Start: 2023-07-22 | End: 2023-07-23 | Stop reason: HOSPADM

## 2023-07-22 RX ORDER — FAMOTIDINE 20 MG/1
20 TABLET, FILM COATED ORAL 2 TIMES DAILY
Status: DISCONTINUED | OUTPATIENT
Start: 2023-07-22 | End: 2023-07-23 | Stop reason: HOSPADM

## 2023-07-22 RX ORDER — SODIUM CHLORIDE 0.9 % (FLUSH) 0.9 %
5-40 SYRINGE (ML) INJECTION PRN
Status: DISCONTINUED | OUTPATIENT
Start: 2023-07-22 | End: 2023-07-23 | Stop reason: HOSPADM

## 2023-07-22 RX ORDER — ACETAMINOPHEN 500 MG
1000 TABLET ORAL ONCE
Status: COMPLETED | OUTPATIENT
Start: 2023-07-22 | End: 2023-07-22

## 2023-07-22 RX ORDER — POTASSIUM CHLORIDE 7.45 MG/ML
10 INJECTION INTRAVENOUS PRN
Status: DISCONTINUED | OUTPATIENT
Start: 2023-07-22 | End: 2023-07-23 | Stop reason: HOSPADM

## 2023-07-22 RX ORDER — MAGNESIUM SULFATE IN WATER 40 MG/ML
2000 INJECTION, SOLUTION INTRAVENOUS PRN
Status: DISCONTINUED | OUTPATIENT
Start: 2023-07-22 | End: 2023-07-23 | Stop reason: HOSPADM

## 2023-07-22 RX ORDER — SODIUM CHLORIDE 9 MG/ML
INJECTION, SOLUTION INTRAVENOUS PRN
Status: DISCONTINUED | OUTPATIENT
Start: 2023-07-22 | End: 2023-07-23 | Stop reason: HOSPADM

## 2023-07-22 RX ORDER — ONDANSETRON 4 MG/1
4 TABLET, ORALLY DISINTEGRATING ORAL EVERY 8 HOURS PRN
Status: DISCONTINUED | OUTPATIENT
Start: 2023-07-22 | End: 2023-07-23 | Stop reason: HOSPADM

## 2023-07-22 RX ORDER — SODIUM CHLORIDE 9 MG/ML
INJECTION, SOLUTION INTRAVENOUS CONTINUOUS
Status: DISCONTINUED | OUTPATIENT
Start: 2023-07-22 | End: 2023-07-23 | Stop reason: HOSPADM

## 2023-07-22 RX ADMIN — FAMOTIDINE 20 MG: 20 TABLET, FILM COATED ORAL at 08:32

## 2023-07-22 RX ADMIN — SODIUM CHLORIDE: 9 INJECTION, SOLUTION INTRAVENOUS at 07:06

## 2023-07-22 RX ADMIN — POTASSIUM CHLORIDE 10 MEQ: 7.46 INJECTION, SOLUTION INTRAVENOUS at 13:04

## 2023-07-22 RX ADMIN — FAMOTIDINE 20 MG: 20 TABLET, FILM COATED ORAL at 23:03

## 2023-07-22 RX ADMIN — ACETAMINOPHEN 1000 MG: 500 TABLET ORAL at 04:16

## 2023-07-22 RX ADMIN — FOLIC ACID 1 MG: 1 TABLET ORAL at 11:31

## 2023-07-22 RX ADMIN — POTASSIUM CHLORIDE 10 MEQ: 7.46 INJECTION, SOLUTION INTRAVENOUS at 10:02

## 2023-07-22 RX ADMIN — POTASSIUM CHLORIDE 10 MEQ: 7.46 INJECTION, SOLUTION INTRAVENOUS at 12:01

## 2023-07-22 RX ADMIN — SODIUM CHLORIDE, PRESERVATIVE FREE 10 ML: 5 INJECTION INTRAVENOUS at 23:06

## 2023-07-22 RX ADMIN — Medication 100 MG: at 11:31

## 2023-07-22 RX ADMIN — TRANEXAMIC ACID 1000 MG: 10 INJECTION, SOLUTION INTRAVENOUS at 05:34

## 2023-07-22 RX ADMIN — BACITRACIN: 500 OINTMENT TOPICAL at 13:16

## 2023-07-22 RX ADMIN — BACITRACIN: 500 OINTMENT TOPICAL at 09:08

## 2023-07-22 RX ADMIN — BACITRACIN: 500 OINTMENT TOPICAL at 23:04

## 2023-07-22 RX ADMIN — IOPAMIDOL 80 ML: 755 INJECTION, SOLUTION INTRAVENOUS at 02:26

## 2023-07-22 RX ADMIN — TRANEXAMIC ACID 1000 MG: 10 INJECTION, SOLUTION INTRAVENOUS at 05:15

## 2023-07-22 RX ADMIN — Medication 1 TABLET: at 11:31

## 2023-07-22 RX ADMIN — POTASSIUM CHLORIDE 10 MEQ: 7.46 INJECTION, SOLUTION INTRAVENOUS at 10:57

## 2023-07-22 ASSESSMENT — ENCOUNTER SYMPTOMS
DIARRHEA: 0
APNEA: 0
BACK PAIN: 1
ABDOMINAL DISTENTION: 0
CHOKING: 0
VOICE CHANGE: 0
SORE THROAT: 0
VOMITING: 0
ABDOMINAL PAIN: 0
EYE REDNESS: 0
CONSTIPATION: 0
COUGH: 0
EYE ITCHING: 0
COLOR CHANGE: 0
FACIAL SWELLING: 0
SHORTNESS OF BREATH: 0
CHEST TIGHTNESS: 0
TROUBLE SWALLOWING: 0
EYE DISCHARGE: 0
STRIDOR: 0
NAUSEA: 0
WHEEZING: 0
EYE PAIN: 0
PHOTOPHOBIA: 0
BACK PAIN: 0
RHINORRHEA: 0
BLOOD IN STOOL: 0
SINUS PRESSURE: 0

## 2023-07-22 ASSESSMENT — PAIN - FUNCTIONAL ASSESSMENT
PAIN_FUNCTIONAL_ASSESSMENT: WONG-BAKER FACES
PAIN_FUNCTIONAL_ASSESSMENT: NONE - DENIES PAIN
PAIN_FUNCTIONAL_ASSESSMENT: WONG-BAKER FACES
PAIN_FUNCTIONAL_ASSESSMENT: WONG-BAKER FACES

## 2023-07-22 ASSESSMENT — PAIN SCALES - GENERAL
PAINLEVEL_OUTOF10: 0

## 2023-07-22 ASSESSMENT — PAIN SCALES - WONG BAKER
WONGBAKER_NUMERICALRESPONSE: 2;4
WONGBAKER_NUMERICALRESPONSE: 2;4
WONGBAKER_NUMERICALRESPONSE: 0
WONGBAKER_NUMERICALRESPONSE: 2;4

## 2023-07-22 ASSESSMENT — PAIN DESCRIPTION - PAIN TYPE
TYPE: ACUTE PAIN
TYPE: ACUTE PAIN

## 2023-07-22 NOTE — ED NOTES
Secondary assessment and scans delayed at this time due to decon.       Jamaal Zamudio RN  07/22/23 9962

## 2023-07-22 NOTE — PLAN OF CARE
Problem: Chronic Conditions and Co-morbidities  Goal: Patient's chronic conditions and co-morbidity symptoms are monitored and maintained or improved  Outcome: Progressing     Problem: Discharge Planning  Goal: Discharge to home or other facility with appropriate resources  Outcome: Progressing     Problem: Pain  Goal: Verbalizes/displays adequate comfort level or baseline comfort level  Outcome: Progressing     Problem: Safety - Adult  Goal: Free from fall injury  Outcome: Progressing     Problem: ABCDS Injury Assessment  Goal: Absence of physical injury  Outcome: Progressing    Care plan reviewed with patient. Patient verbalizes understanding of the plan of care and contributes to goal setting.

## 2023-07-22 NOTE — ED NOTES
Pt lying quietly in bed at this time. Pt updated on POC, verbalized understanding.       Chino Elise RN  07/22/23 1495

## 2023-07-22 NOTE — ED PROVIDER NOTES
[facility] CVICU 4B      EMERGENCY MEDICINE     Pt Name: Nadia Yu  MRN: [id number]  [facility] 1950  Date of evaluation: 7/22/2023  Provider: Alexy Alexander MD    CHIEF COMPLAINT       Chief Complaint   Patient presents with    Trauma     Level 2     HISTORY OF PRESENT ILLNESS   Nadia Yu is a pleasant 72 y.o. female who presents to the emergency department from  the bar , brought in by EMS for evaluation of a fall while on blood thinners. Patient states that she was drinking at the bar. She fell off the barstool and hit her head. She is unsure if she fell backwards but she denies any loss of consciousness. She does complain of mild neck pain. EMS states that she has a large laceration to the back of her head. Patient is on Plavix. She states that she had 4 drinks this evening. Patient was activated as a level 2 trauma secondary to head injury, age, and blood thinners. PASTMEDICAL HISTORY     Past Medical History:   Diagnosis Date    Ascending aortic aneurysm ([address])     Stroke ([address]) 11/1/14    Unspecified cerebral artery occlusion with cerebral infarction        Patient Active Problem List   Diagnosis Code    Acute CVA (cerebrovascular accident) [facility]) I63.9    Ascending aortic aneurysm ([address]) I71.21    Late effect of cerebrovascular accident (CVA) I69.30    Essential hypertension I10    Aphasia R47.01    Homonymous hemianopia, right H53.461    B12 deficiency E53.8    Expressive aphasia R47.01    Fall from stool W08. XXXA    Traumatic hematoma of scalp S00. 96VV    Alcoholic intoxication without complication (HCC) H27.277    Infestation by bed bug B88.8    Intraparenchymal hemorrhage of brain ([facility]) I61.9     SURGICAL HISTORY       Past Surgical History:   Procedure Laterality Date    CATARACT REMOVAL         CURRENT MEDICATIONS       Current Discharge Medication List        CONTINUE these medications which have NOT CHANGED    Details   hydroCHLOROthiazide (HYDRODIURIL) 25 MG tablet Take 1 tablet by

## 2023-07-22 NOTE — ED TRIAGE NOTES
Pt comes to ED with c/o fall on blood thinners. Pt states that she fell backwards off one step. Pt reports that she had 5 beers.

## 2023-07-22 NOTE — H&P
Punctate foreign body within the scalp within the left    parieto-occipital region at the site of scalp laceration and contusion. This document has been electronically signed by: Azucena Cantor MD on    07/22/2023 03:47 AM      All CTs at this facility use dose modulation techniques and iterative    reconstructions, and/or weight-based dosing   when appropriate to reduce radiation to a low as reasonably achievable. CT THORACIC RECONSTRUCTION WO POST PROCESS   Final Result   Cannot exclude a nondisplaced fracture of the superior endplate of T7. This document has been electronically signed by: Azucena Cantor MD on    07/22/2023 04:19 AM      All CTs at this facility use dose modulation techniques and iterative    reconstructions, and/or weight-based dosing   when appropriate to reduce radiation to a low as reasonably achievable. CT LUMBAR RECONSTRUCTION WO POST PROCESS   Final Result   1. No evidence of lumbar spine fracture. This document has been electronically signed by: Azucena Cantor MD on    07/22/2023 04:08 AM      All CTs at this facility use dose modulation techniques and iterative    reconstructions, and/or weight-based dosing   when appropriate to reduce radiation to a low as reasonably achievable. CT head without contrast    (Results Pending)     Fast Exam: No    25 Minutes spent in patient care collectively between subjective/objective examination, chart review, documentation, clinical reasoning and discussion with attending regarding plan/interval changes. Electronically signed by SID Welch CNP on 7/22/2023 at 6:39 AM  Patient seen and examined independently by me. Above discussed and I agree with CNP. Labs, cultures, and radiographs where available were reviewed. See orders for the updated patient care plan. Beronica Gale MD MD, this was a level 2 trauma admission time called was 1:05 AM time seen was 1:22 AM mechanism was fall from the bar.

## 2023-07-22 NOTE — CONSULTS
[place], [place]                                          NEUROSURGICAL CONSULTATION NOTE       Bridget Long   YOB: 1950  Account Number: [de-identified]   Date of Examination: 7/22/2023    ASSESSMENT:    -This is a 70-year-old female s/p fall of stoolbar who underwent a brain CT that showed a small traumatic subarachnoid hemorrhage. -GCS 15/15.  -There is no new focal neurodeficit. PLAN:    -Medical management per ICU team and patient primary.  -Keep systolic blood pressure less than 160 and above 100.  -A dose of TXA to be given to the patient.  -Coagulation profile.  -Treatment of other injuries are per the corresponding service.  -Stop any anticoagulation medication at this time.  -Seizure precaution.  -New brain CT tomorrow morning.  -If tomorrow brain CT is stable then patient can be discharged from neurosurgical perspective. She will need to follow-up with her primary care provider after 1 week with a new brain CT. -The case was discussed in detail with the ICU team, trauma team and with the patient and her nurse. HISTORY OF PRESENT ILLNESS:  Bridget Long is a 72 y.o. female, admitted on :7/22/2023  1:00 AM  This is a 70-year-old old female who was brought to the ER for evaluation of injuries sustained after after she fell off a stool bar  There is no obvious history of loss of consciousness. There is no history of new focal neurodeficit. Patient underwent a brain CT that showed small traumatic subarachnoid hemorrhage. She is on a Plavix  ROS:    Review of Systems   Constitutional:  Negative for fever. HENT:  Negative for congestion. Eyes:  Negative for visual disturbance. Respiratory:  Negative for chest tightness. Cardiovascular:  Negative for chest pain. Gastrointestinal:  Negative for abdominal distention. Genitourinary:  Negative for difficulty urinating. Musculoskeletal:  Positive for back pain. Skin:  Positive for wound.  Negative for

## 2023-07-23 ENCOUNTER — APPOINTMENT (OUTPATIENT)
Dept: CT IMAGING | Age: 73
DRG: 086 | End: 2023-07-23
Payer: MEDICARE

## 2023-07-23 VITALS
HEART RATE: 70 BPM | BODY MASS INDEX: 26.75 KG/M2 | TEMPERATURE: 98.4 F | WEIGHT: 136.24 LBS | RESPIRATION RATE: 26 BRPM | OXYGEN SATURATION: 99 % | DIASTOLIC BLOOD PRESSURE: 60 MMHG | SYSTOLIC BLOOD PRESSURE: 118 MMHG | HEIGHT: 60 IN

## 2023-07-23 LAB
ANION GAP SERPL CALC-SCNC: 9 MEQ/L (ref 8–16)
BASOPHILS ABSOLUTE: 0 THOU/MM3 (ref 0–0.1)
BASOPHILS NFR BLD AUTO: 0.6 %
BUN SERPL-MCNC: 14 MG/DL (ref 7–22)
CALCIUM SERPL-MCNC: 8.5 MG/DL (ref 8.5–10.5)
CHLORIDE SERPL-SCNC: 108 MEQ/L (ref 98–111)
CO2 SERPL-SCNC: 23 MEQ/L (ref 23–33)
CREAT SERPL-MCNC: 0.6 MG/DL (ref 0.4–1.2)
DEPRECATED RDW RBC AUTO: 44.4 FL (ref 35–45)
EOSINOPHIL NFR BLD AUTO: 2.2 %
EOSINOPHILS ABSOLUTE: 0.1 THOU/MM3 (ref 0–0.4)
ERYTHROCYTE [DISTWIDTH] IN BLOOD BY AUTOMATED COUNT: 14.1 % (ref 11.5–14.5)
GFR SERPL CREATININE-BSD FRML MDRD: > 60 ML/MIN/1.73M2
GLUCOSE SERPL-MCNC: 98 MG/DL (ref 70–108)
HCT VFR BLD AUTO: 35.2 % (ref 37–47)
HGB BLD-MCNC: 11.2 GM/DL (ref 12–16)
IMM GRANULOCYTES # BLD AUTO: 0 THOU/MM3 (ref 0–0.07)
IMM GRANULOCYTES NFR BLD AUTO: 0 %
LYMPHOCYTES ABSOLUTE: 0.9 THOU/MM3 (ref 1–4.8)
LYMPHOCYTES NFR BLD AUTO: 24.4 %
MCH RBC QN AUTO: 27.5 PG (ref 26–33)
MCHC RBC AUTO-ENTMCNC: 31.8 GM/DL (ref 32.2–35.5)
MCV RBC AUTO: 86.5 FL (ref 81–99)
MONOCYTES ABSOLUTE: 0.3 THOU/MM3 (ref 0.4–1.3)
MONOCYTES NFR BLD AUTO: 7.9 %
NEUTROPHILS NFR BLD AUTO: 64.9 %
NRBC BLD AUTO-RTO: 0 /100 WBC
PLATELET # BLD AUTO: 137 THOU/MM3 (ref 130–400)
PMV BLD AUTO: 11.6 FL (ref 9.4–12.4)
POTASSIUM SERPL-SCNC: 4 MEQ/L (ref 3.5–5.2)
RBC # BLD AUTO: 4.07 MILL/MM3 (ref 4.2–5.4)
SEGMENTED NEUTROPHILS ABSOLUTE COUNT: 2.3 THOU/MM3 (ref 1.8–7.7)
SODIUM SERPL-SCNC: 140 MEQ/L (ref 135–145)
WBC # BLD AUTO: 3.6 THOU/MM3 (ref 4.8–10.8)

## 2023-07-23 PROCEDURE — 6370000000 HC RX 637 (ALT 250 FOR IP): Performed by: NURSE PRACTITIONER

## 2023-07-23 PROCEDURE — 93010 ELECTROCARDIOGRAM REPORT: CPT | Performed by: INTERNAL MEDICINE

## 2023-07-23 PROCEDURE — 2580000003 HC RX 258: Performed by: NURSE PRACTITIONER

## 2023-07-23 PROCEDURE — 97163 PT EVAL HIGH COMPLEX 45 MIN: CPT

## 2023-07-23 PROCEDURE — 70450 CT HEAD/BRAIN W/O DYE: CPT

## 2023-07-23 PROCEDURE — 80048 BASIC METABOLIC PNL TOTAL CA: CPT

## 2023-07-23 PROCEDURE — 99291 CRITICAL CARE FIRST HOUR: CPT | Performed by: INTERNAL MEDICINE

## 2023-07-23 PROCEDURE — APPSS30 APP SPLIT SHARED TIME 16-30 MINUTES: Performed by: PHYSICIAN ASSISTANT

## 2023-07-23 PROCEDURE — 85025 COMPLETE CBC W/AUTO DIFF WBC: CPT

## 2023-07-23 PROCEDURE — 97530 THERAPEUTIC ACTIVITIES: CPT

## 2023-07-23 PROCEDURE — 36415 COLL VENOUS BLD VENIPUNCTURE: CPT

## 2023-07-23 PROCEDURE — 99231 SBSQ HOSP IP/OBS SF/LOW 25: CPT | Performed by: NEUROLOGICAL SURGERY

## 2023-07-23 RX ORDER — AMLODIPINE BESYLATE 10 MG/1
10 TABLET ORAL DAILY
Status: DISCONTINUED | OUTPATIENT
Start: 2023-07-23 | End: 2023-07-23 | Stop reason: HOSPADM

## 2023-07-23 RX ORDER — CLOPIDOGREL BISULFATE 75 MG/1
75 TABLET ORAL DAILY
Qty: 90 TABLET | Refills: 3 | Status: SHIPPED | OUTPATIENT
Start: 2023-08-04

## 2023-07-23 RX ADMIN — Medication 100 MG: at 07:53

## 2023-07-23 RX ADMIN — AMLODIPINE BESYLATE 10 MG: 10 TABLET ORAL at 11:20

## 2023-07-23 RX ADMIN — SODIUM CHLORIDE: 9 INJECTION, SOLUTION INTRAVENOUS at 03:11

## 2023-07-23 RX ADMIN — FOLIC ACID 1 MG: 1 TABLET ORAL at 07:53

## 2023-07-23 RX ADMIN — Medication 1 TABLET: at 07:53

## 2023-07-23 RX ADMIN — POLYETHYLENE GLYCOL 3350 17 G: 17 POWDER, FOR SOLUTION ORAL at 07:54

## 2023-07-23 RX ADMIN — FAMOTIDINE 20 MG: 20 TABLET, FILM COATED ORAL at 07:53

## 2023-07-23 RX ADMIN — SODIUM CHLORIDE, PRESERVATIVE FREE 10 ML: 5 INJECTION INTRAVENOUS at 07:53

## 2023-07-23 RX ADMIN — BACITRACIN: 500 OINTMENT TOPICAL at 07:54

## 2023-07-23 ASSESSMENT — ENCOUNTER SYMPTOMS
NAUSEA: 0
TROUBLE SWALLOWING: 0
COLOR CHANGE: 0
DIARRHEA: 0
EYE REDNESS: 0
SHORTNESS OF BREATH: 0
CHEST TIGHTNESS: 0
ABDOMINAL PAIN: 0
CONSTIPATION: 0
ABDOMINAL DISTENTION: 0
BACK PAIN: 0
COUGH: 0

## 2023-07-23 ASSESSMENT — PAIN SCALES - GENERAL: PAINLEVEL_OUTOF10: 0

## 2023-07-23 NOTE — DISCHARGE SUMMARY
Discharge Summary   Trauma Services    Patient Identification:  Sondra Martinez  : 1950  MRN: [id number]   Account: [de-identified]     Admit date: 2023  Discharge date: 23  Attending provider: Beronica Gale MD        Primary care provider: SID Doan CNP     Discharge Diagnoses:   Principal Problem:    Fall from stool  Active Problems:    Traumatic hematoma of scalp    Alcoholic intoxication without complication ([address])    Infestation by bed bug    Intraparenchymal hemorrhage of brain ([address])  Resolved Problems:    * No resolved hospital problems. *       Hospital Course:   Sondra Martinez is a 72 y.o. female admitted to [facility] on 2023 for a small subarachnoid hemorrhage in the right frontal and anterior temporal lobes, closed head injury, scalp hematoma with an abrasion and punctate laceration, ETOH intoxication and bedbug infestation as well as a possible T7 superior endplate fracture following a fall from a barstool with no known loss of consciousness. Report stated the patient had approximately 4-5 beers at the bar and then fell backwards off of the barstool, hitting the back of her head. She suffered a hematoma to the occipital scalp with an abrasion and an punctate laceration. She reported a headache upon arrival.  Upon initial assessment by the ER staff, she was found to have an infestation of bedbugs and required decontamination. Admitted under trauma services to the ICU. Inpatient management included as follows:   Admitted to the ICU under Trauma Services     Trauma by fall              - Fall precautions              - PT/OT eval and treat     Small subarachnoid hemorrhage in the right frontal and anterior temporal lobes              -Consult neurosurgery               -Neuro checks per unit routine              -Maintain systolic blood pressure between 100-160              -Trauma dose TXA given               -Elevate head of bed approximately 30

## 2023-07-23 NOTE — DISCHARGE INSTR - DIET
Good nutrition is important when healing from an illness, injury, or surgery. Follow any nutrition recommendations given to you during your hospital stay. If you were given an oral nutrition supplement while in the hospital, continue to take this supplement at home. You can take it with meals, in-between meals, and/or before bedtime. These supplements can be purchased at most local grocery stores, pharmacies, and chain [employer]-stores. If you have any questions about your diet or nutrition, call the hospital and ask for the dietitian.     As tolerated: you were given regular ensures with each of your meals; you may want to continue taking them at home

## 2023-07-23 NOTE — CONSULTS
Patient:  Debra Sandy    Unit/Bed:4B-01/001-A  MRN: [id number]   PCP: SID Barajas CNP  Date of Admission: 7/22/2023    Assessment and Plan(All pulmonary edema, renal failure, PE, and respiratory failure diagnoses are acute in nature unless otherwise specified):        Acute traumatic small subarachnoid hemorrhage: involving right frontal and anterior temporal lobes. Neurosurgery following. Conservative management. Repeat CT head this morning with decreased size of SAH. Neuro-checks. GCS 15 with no focal deficits. SBP goal 100-160. Seizure precautions. SLP eval. Fall precautions. Hold home DAPT. OK from ICU standpoint to transfer out of ICU, if OK with neurosurgery and primary team.   Scalp hematoma: No active bleeding. Denies pain. Monitor. Closed head injury: low stimulation guidelines. SLP following. PT/OT. Neuro-checks. ?T7 superior endplate non-displaced fracture: noted as possible fracture on CT. Denies neck pain. Trauma services following. Fall: Fall precautions. PT/OT. HTN: Controlled. Resume home norvasc today. ?uterine/sigmoid colon fistula: per CT abd/pelvis. No prior CT to review. Patient denies symptoms. Discussed with general surgery team; they will review. Multiple uterine masses: per CT abd/pelvis. No prior CT to review. Patient denies symptoms. Discussed with general surgery team; they will review. Suggest GYN consult either prior to d/c or outpatient referral asap within the next few weeks. Acute normocytic anemia: suspect dilutional. Hgb 11.2, from 13.5. Drop in all cell lines. Unclear how much IVF patient got from EMS prior to ED, and within the ED. No active signs of bleeding. Monitor CBC daily. Alcohol abuse, history of: Patient and spouse state she used to drink multiple beers daily in the remote past when she worked as a . Now just drinks socially on weekends. PAWS score = 2 (not at elevated risk for withdrawal). No needs for withdrawal prophylaxis.

## 2023-07-23 NOTE — DISCHARGE INSTRUCTIONS
Light activity until you are seen in the office by Dr. Volodymyr Owusu. You may shower. Lightly wash the area around your hematoma/abrasion with shampoo and pat dry. You need to follow up with a gynecologist for the abnormal findings on CT of the abdomen and pelvis.

## 2023-07-24 ENCOUNTER — CARE COORDINATION (OUTPATIENT)
Dept: CASE MANAGEMENT | Age: 73
End: 2023-07-24

## 2023-07-24 ENCOUNTER — TELEPHONE (OUTPATIENT)
Dept: FAMILY MEDICINE CLINIC | Age: 73
End: 2023-07-24

## 2023-07-24 DIAGNOSIS — I61.9 INTRAPARENCHYMAL HEMORRHAGE OF BRAIN (HCC): Primary | ICD-10-CM
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
## 2023-07-24 LAB
BACTERIA SPEC AEROBE CULT: NORMAL
EKG ATRIAL RATE: 62 BPM
EKG ATRIAL RATE: 68 BPM
EKG P AXIS: 51 DEGREES
EKG P AXIS: 59 DEGREES
EKG P-R INTERVAL: 166 MS
EKG P-R INTERVAL: 190 MS
EKG Q-T INTERVAL: 430 MS
EKG Q-T INTERVAL: 438 MS
EKG QRS DURATION: 88 MS
EKG QRS DURATION: 98 MS
EKG QTC CALCULATION (BAZETT): 444 MS
EKG QTC CALCULATION (BAZETT): 457 MS
EKG R AXIS: -25 DEGREES
EKG R AXIS: -27 DEGREES
EKG T AXIS: 21 DEGREES
EKG T AXIS: 6 DEGREES
EKG VENTRICULAR RATE: 62 BPM
EKG VENTRICULAR RATE: 68 BPM

## 2023-07-24 PROCEDURE — 1111F DSCHRG MED/CURRENT MED MERGE: CPT | Performed by: NURSE PRACTITIONER

## 2023-07-24 NOTE — CARE COORDINATION
and resources available to patient including: PCP  Specialist  Urgent care clinics  When to call 911. The family agrees to contact the PCP office for questions related to their healthcare. Advance Care Planning:   Does patient have an Advance Directive: not on file. Medication reconciliation was performed with family, who verbalizes understanding of administration of home medications. Medications reviewed, 1111F entered: yes    Was patient discharged with a pulse oximeter? no    Non-face-to-face services provided:  Scheduled appointment with PCP-7/27  Scheduled appointment with Specialist- will call  Obtained and reviewed discharge summary and/or continuity of care documents    Offered patient enrollment in the Remote Patient Monitoring (RPM) program for in-home monitoring: Patient is not eligible for RPM program.    Care Transitions 24 Hour Call    Schedule Follow Up Appointment with PCP: Completed  Do you have a copy of your discharge instructions?: Yes  Do you have all of your prescriptions and are they filled?: Yes  Have you been contacted by a [facility] Pharmacist?: No  Have you scheduled your follow up appointment?: Yes  How are you going to get to your appointment?: Car - family or friend to transport  Do you feel like you have everything you need to keep you well at home?: Yes  Care Transitions Interventions     Transportation Support: Declined            Discussed follow-up appointments. If no appointment was previously scheduled, appointment scheduling offered: Yes. Is follow up appointment scheduled within 7 days of discharge? Yes. Follow Up  Future Appointments   Date Time Provider [address]   7/27/2023  9:20 AM SID Savage - [facility]   7/31/2023  9:00 AM STR [facility] IMAGING [facility] DEL CT STR [place]       Care Transition Nurse provided contact information. Plan for follow-up call in 5-7 days based on severity of symptoms and risk factors.   Plan for

## 2023-07-24 NOTE — TELEPHONE ENCOUNTER
Care Transitions Initial Follow Up Call    Outreach made within 2 business days of discharge: Yes    Patient: Pastor Che Patient : 1950   MRN: [id number]  Reason for Admission: There are no discharge diagnoses documented for the most recent discharge. Discharge Date: 23       Spoke with: Yodit Vegas    Discharge department/facility: [facility]    TCM Interactive Patient Contact:  Was patient able to fill all prescriptions: No new meds  Was patient instructed to bring all medications to the follow-up visit: Yes  Is patient taking all medications as directed in the discharge summary?  Yes  Does patient understand their discharge instructions: Yes  Does patient have questions or concerns that need addressed prior to 7-14 day follow up office visit: no    Scheduled appointment with PCP within 7-14 days    Follow Up  Future Appointments   Date Time Provider [address] [place] [address]   2023  9:20 Brennan Clinton, [address] LPN

## 2023-07-24 NOTE — TELEPHONE ENCOUNTER
Care Transitions Initial Follow Up Call    Outreach made within 2 business days of discharge: Yes    Patient: Ruel Renee Patient : 1950   MRN: [id number]  Reason for Admission: There are no discharge diagnoses documented for the most recent discharge. Discharge Date: 23       Spoke with: Left message on answering machine requesting pt to call back at earliest convenience. Discharge department/facility: [address] [place] [address] Patient Contact:  Was patient able to fill all prescriptions:   Was patient instructed to bring all medications to the follow-up visit:   Is patient taking all medications as directed in the discharge summary? Does patient understand their discharge instructions:   Does patient have questions or concerns that need addressed prior to 7-14 day follow up office visit:     Scheduled appointment with PCP within 7-14 days  [address] F/U APPT  Follow Up  No future appointments.     Alma Hernandez LPN

## 2023-07-27 ENCOUNTER — OFFICE VISIT (OUTPATIENT)
Dept: FAMILY MEDICINE CLINIC | Age: 73
End: 2023-07-27

## 2023-07-27 VITALS
TEMPERATURE: 97.8 F | RESPIRATION RATE: 18 BRPM | HEIGHT: 60 IN | WEIGHT: 138 LBS | BODY MASS INDEX: 27.09 KG/M2 | SYSTOLIC BLOOD PRESSURE: 122 MMHG | DIASTOLIC BLOOD PRESSURE: 68 MMHG | HEART RATE: 73 BPM | OXYGEN SATURATION: 96 %

## 2023-07-27 DIAGNOSIS — Z91.81 AT HIGH RISK FOR FALLS: ICD-10-CM

## 2023-07-27 DIAGNOSIS — I10 ESSENTIAL HYPERTENSION: Primary | ICD-10-CM

## 2023-07-27 DIAGNOSIS — I69.30 LATE EFFECT OF CEREBROVASCULAR ACCIDENT (CVA): ICD-10-CM

## 2023-07-27 DIAGNOSIS — D64.9 ANEMIA, UNSPECIFIED TYPE: ICD-10-CM

## 2023-07-27 DIAGNOSIS — I71.21 ANEURYSM OF ASCENDING AORTA WITHOUT RUPTURE (HCC): ICD-10-CM

## 2023-07-27 DIAGNOSIS — N85.8 UTERINE MASS: ICD-10-CM

## 2023-07-27 RX ORDER — AMLODIPINE BESYLATE 10 MG/1
10 TABLET ORAL DAILY
Qty: 90 TABLET | Refills: 3 | Status: SHIPPED | OUTPATIENT
Start: 2023-07-27 | End: 2024-07-21

## 2023-07-27 RX ORDER — HYDROCHLOROTHIAZIDE 25 MG/1
25 TABLET ORAL DAILY
Qty: 90 TABLET | Refills: 3 | Status: SHIPPED | OUTPATIENT
Start: 2023-07-27

## 2023-07-27 SDOH — ECONOMIC STABILITY: HOUSING INSECURITY
IN THE LAST 12 MONTHS, WAS THERE A TIME WHEN YOU DID NOT HAVE A STEADY PLACE TO SLEEP OR SLEPT IN A SHELTER (INCLUDING NOW)?: NO

## 2023-07-27 SDOH — ECONOMIC STABILITY: INCOME INSECURITY: HOW HARD IS IT FOR YOU TO PAY FOR THE VERY BASICS LIKE FOOD, HOUSING, MEDICAL CARE, AND HEATING?: NOT HARD AT ALL

## 2023-07-27 SDOH — ECONOMIC STABILITY: FOOD INSECURITY: WITHIN THE PAST 12 MONTHS, THE FOOD YOU BOUGHT JUST DIDN'T LAST AND YOU DIDN'T HAVE MONEY TO GET MORE.: NEVER TRUE

## 2023-07-27 SDOH — ECONOMIC STABILITY: FOOD INSECURITY: WITHIN THE PAST 12 MONTHS, YOU WORRIED THAT YOUR FOOD WOULD RUN OUT BEFORE YOU GOT MONEY TO BUY MORE.: NEVER TRUE

## 2023-07-27 ASSESSMENT — PATIENT HEALTH QUESTIONNAIRE - PHQ9
SUM OF ALL RESPONSES TO PHQ QUESTIONS 1-9: 0
2. FEELING DOWN, DEPRESSED OR HOPELESS: 0
1. LITTLE INTEREST OR PLEASURE IN DOING THINGS: 0
SUM OF ALL RESPONSES TO PHQ9 QUESTIONS 1 & 2: 0

## 2023-07-27 NOTE — PROGRESS NOTES
Transition of Care Visit/Hospital Follow Up:      Gautam Eli is a 72 y.o. female that presents for Follow-up        Date of Discharge:   7-  Was patient contacted within 2 business days of discharge (see chart for documentation):  yes -       Patient presents for hospital follow up. Patient recently hospitalized at [facility] for treatment of fall, and alcohol intoxication, and hematoma of scalp. Symptoms prior to admission:  fell backwards off barstool, suffered hematoma. Developed headache   Also found to have bedbugs. Hospital Course per DC Summary:    Here today with     Hospital Course:   Gautam Eli is a 72 y.o. female admitted to [facility] on 7/22/2023 for a small subarachnoid hemorrhage in the right frontal and anterior temporal lobes, closed head injury, scalp hematoma with an abrasion and punctate laceration, ETOH intoxication and bedbug infestation as well as a possible T7 superior endplate fracture following a fall from a barstool with no known loss of consciousness. Report stated the patient had approximately 4-5 beers at the bar and then fell backwards off of the barstool, hitting the back of her head. She suffered a hematoma to the occipital scalp with an abrasion and an punctate laceration. She reported a headache upon arrival.  Upon initial assessment by the ER staff, she was found to have an infestation of bedbugs and required decontamination. Admitted under trauma services to the ICU. Inpatient management included as follows:   Admitted to the ICU under Trauma Services     Trauma by fall              - Fall precautions              - PT/OT eval and treat     Small subarachnoid hemorrhage in the right frontal and anterior temporal lobes              -Consult neurosurgery               -Neuro checks per unit routine              -Maintain systolic blood pressure between 100-160              -Trauma dose TXA given               -Elevate head of bed

## 2023-08-01 ENCOUNTER — CARE COORDINATION (OUTPATIENT)
Dept: CASE MANAGEMENT | Age: 73
End: 2023-08-01

## 2023-08-01 NOTE — CARE COORDINATION
Care Transitions Outreach Attempt-1st attempt    Call within 2 business days of discharge: Yes   Attempted to reach patient for subsequent transitional call. Left HIPPA compliant VM to return call directly to [phone]. Patient: Pastor Che Patient : 1950 MRN: [id number]    Last Discharge [address] [facility] [facility]       Date Complaint Diagnosis Description Type Department Provider    23 Trauma Intraparenchymal hemorrhage of brain ([address]) . .. ED to Hosp-Admission (Discharged) (ADMITTED) M[facility] 4B José Manuel Pettit MD; Araseli Santos...               Noted following upcoming appointments from discharge chart review:   [facility] follow up appointment(s):   Future Appointments   Date Time Provider [address]   2024  9:40 AM [facility]     Non-[facility] follow up appointment(s): alexa

## 2023-08-03 ENCOUNTER — CARE COORDINATION (OUTPATIENT)
Dept: CASE MANAGEMENT | Age: 73
End: 2023-08-03

## 2023-08-03 NOTE — CARE COORDINATION
Care Transitions Outreach Attempt-2nd attempt    Call within 2 business days of discharge: Yes   Attempted to reach patient for subsequent transitional call. Left HIPPA compliant VM to return call directly to [phone]. If no return call, CTN will sign off-2nd attempt. Patient: Chad Gonzalez Patient : 1950 MRN: [id number]    Last Discharge [address] [facility] [facility]       Date Complaint Diagnosis Description Type Department Provider    23 Trauma Intraparenchymal hemorrhage of brain ([address]) . .. ED to Hosp-Admission (Discharged) (ADMITTED) [facility] 4B Satnam Goins MD; Bethany Borrero...               Noted following upcoming appointments from discharge chart review:   [facility] follow up appointment(s):   Future Appointments   Date Time Provider [address]  [facility]   2024  9:40 AM [address] [facility] [place]     [facility] follow up appointment(s): na

## 2024-01-28 NOTE — CONSULTS
Hospital Medicine  History and Physical    Patient:  Aurelia Thornton  MRN: [id number]    CHIEF COMPLAINT:    Chief Complaint   Patient presents with    Illness     Cough, congestion, chest discomfort since thursday    Constipation       History Obtained From:  Patient, EMR  Primary Care Physician: Brittnee Carrizales MD    HISTORY OF PRESENT ILLNESS:   The patient is a 76 y.o. female with PMHx of A Fib, chronic diastolic CHF, CAD, HTN, HLD, PAD who presents with abdominal discomfort.      The patient states she presented with discomfort in her chest that started on Thursday.  She thought she was catching a cold; then it moved down to her stomach. . She has been constipated so she attributed it to that.  She took a stool softeners and she started feeling more bloated.  She then drank water and has felt like her stomach is full since.  She has some relief Friday and Saturday from passing gas but otherwise has still felt bloated.  Denies fevers, chills, sweats; she has had feeling more tired than usual with exertion starting Saturday.  Denies nauea or vomiting.      Past Medical History:      Diagnosis Date    A-fib (HCC)     Chronic diastolic heart failure (HCC) 10/8/2015    CKD (chronic kidney disease), stage III ([facility]) 10/8/2015    Coronary artery disease involving native coronary artery of native heart without angina pectoris 10/8/2015    Essential hypertension 10/8/2015    Family history of coronary artery disease 10/8/2015    History of non-ST elevation myocardial infarction (NSTEMI) 10/8/2015    HTN (hypertension)     Hyperlipidemia 10/8/2015    PAD (peripheral artery disease) (HCC) 10/8/2015       Past Surgical History:      Procedure Laterality Date    CARDIAC CATHETERIZATION  09/10/15    [facility] DO    CARDIAC CATHETERIZATION      TOE AMPUTATION Right 02/2016       Medications Prior to Admission:    Prior to Admission medications    Medication Sig Start Date End Date Taking? Authorizing Provider   isosorbide  Patient:  Georges Walls    Unit/Bed:4B-01/001-A  MRN: [id number]   PCP: SID Ortiz CNP  Date of Admission: 7/22/2023    Assessment and Plan(All pulmonary edema, renal failure, PE, and respiratory failure diagnoses are acute in nature unless otherwise specified):        Acute traumatic small subarachnoid hemorrhage: involving right frontal and anterior temporal lobes. Neurosurgery following. Conservative management. Repeat CT head in AM. Neuro-checks. GCS 15 with no focal deficits. SBP goal 100-160. Seizure precautions. SLP eval. Fall precautions. Hold home DAPT. Scalp hematoma: No active bleeding. Denies pain. Monitor. Closed head injury: low stimulation guidelines. SLP following. PT/OT. Neuro-checks. ?T7 superior endplate non-displaced fracture: noted as possible fracture on CT. Denies neck pain. Trauma services following. Fall: Fall precautions. PT/OT when appropriate. HTN: Nicardipine infusion as needed to keep -160. Hypokalemia: mild. Replacement protocol ordered. Alcohol abuse, history of: Patient and spouse state she used to drink multiple beers daily in the remote past when she worked as a . Now just drinks socially on weekends. PAWS score = 2 (not at elevated risk for withdrawal). No needs for withdrawal prophylaxis. Thiamine, folic acid, multivitamin. Ascending aortic aneurysm, history of: stable at 4 cm per CT chest. HLD, history of  Alcohol intoxication: resolved. CC:  Fall  HPI: Jl Laboy is a 71 y/o female never-smoker with PMH of ascending aortic aneurysm, ischemic stroke in 2014 and 2017, HTN, HLD. Patient presented to [facility] on 7/22/23 for trauma evaluation, following a fall. Patient had been consuming alcohol at a bar when she had fallen backward off of a stage when singing karaoke, hitting her head. She denied any LOC. GCS was 15. Patient had no neurological deficits.  She had a left temporoparietal scalp hematoma with

## 2024-02-13 ENCOUNTER — ENROLLMENT (OUTPATIENT)
Dept: PHARMACY | Facility: CLINIC | Age: 74
End: 2024-02-13

## 2024-04-02 ENCOUNTER — COMMUNITY OUTREACH (OUTPATIENT)
Dept: FAMILY MEDICINE CLINIC | Age: 74
End: 2024-04-02

## 2024-06-06 ENCOUNTER — TELEPHONE (OUTPATIENT)
Dept: FAMILY MEDICINE CLINIC | Age: 74
End: 2024-06-06

## 2024-08-23 ENCOUNTER — APPOINTMENT (OUTPATIENT)
Dept: CT IMAGING | Age: 74
End: 2024-08-23
Payer: MEDICARE

## 2024-08-23 ENCOUNTER — APPOINTMENT (OUTPATIENT)
Dept: GENERAL RADIOLOGY | Age: 74
End: 2024-08-23
Payer: MEDICARE

## 2024-08-23 ENCOUNTER — APPOINTMENT (OUTPATIENT)
Dept: MRI IMAGING | Age: 74
End: 2024-08-23
Payer: MEDICARE

## 2024-08-23 ENCOUNTER — HOSPITAL ENCOUNTER (INPATIENT)
Age: 74
LOS: 13 days | Discharge: NURSING, REHAB OR LONG-TERM CARE | End: 2024-09-05
Attending: FAMILY MEDICINE | Admitting: STUDENT IN AN ORGANIZED HEALTH CARE EDUCATION/TRAINING PROGRAM
Payer: MEDICARE

## 2024-08-23 DIAGNOSIS — R41.82 ALTERED MENTAL STATUS, UNSPECIFIED ALTERED MENTAL STATUS TYPE: Primary | ICD-10-CM

## 2024-08-23 DIAGNOSIS — R47.01 APHASIA: ICD-10-CM

## 2024-08-23 DIAGNOSIS — I63.513 CEREBROVASCULAR ACCIDENT (CVA) DUE TO BILATERAL STENOSIS OF MIDDLE CEREBRAL ARTERIES (HCC): ICD-10-CM

## 2024-08-23 DIAGNOSIS — R41.0 DISORIENTATION: ICD-10-CM

## 2024-08-23 DIAGNOSIS — I63.9 ACUTE CVA (CEREBROVASCULAR ACCIDENT) (HCC): ICD-10-CM

## 2024-08-23 DIAGNOSIS — D50.9 IRON DEFICIENCY ANEMIA, UNSPECIFIED IRON DEFICIENCY ANEMIA TYPE: ICD-10-CM

## 2024-08-23 DIAGNOSIS — R41.0 CONFUSION: ICD-10-CM

## 2024-08-23 PROBLEM — R94.01 ABNORMAL EEG: Status: ACTIVE | Noted: 2024-08-23

## 2024-08-23 PROBLEM — D64.9 ANEMIA: Status: ACTIVE | Noted: 2024-08-23

## 2024-08-23 LAB
ABO: NORMAL
ALBUMIN SERPL BCG-MCNC: 4.2 G/DL (ref 3.5–5.1)
ALP SERPL-CCNC: 64 U/L (ref 38–126)
ALT SERPL W/O P-5'-P-CCNC: 6 U/L (ref 11–66)
ANION GAP SERPL CALC-SCNC: 13 MEQ/L (ref 8–16)
ANTIBODY SCREEN: NORMAL
APTT PPP: 29.2 SECONDS (ref 22–38)
AST SERPL-CCNC: 12 U/L (ref 5–40)
BILIRUB SERPL-MCNC: 0.4 MG/DL (ref 0.3–1.2)
BUN SERPL-MCNC: 7 MG/DL (ref 7–22)
CALCIUM SERPL-MCNC: 9 MG/DL (ref 8.5–10.5)
CHLORIDE SERPL-SCNC: 100 MEQ/L (ref 98–111)
CHOLEST SERPL-MCNC: 174 MG/DL (ref 100–199)
CO2 SERPL-SCNC: 22 MEQ/L (ref 23–33)
CREAT SERPL-MCNC: 0.7 MG/DL (ref 0.4–1.2)
EKG ATRIAL RATE: 63 BPM
EKG P AXIS: 44 DEGREES
EKG P-R INTERVAL: 158 MS
EKG Q-T INTERVAL: 414 MS
EKG QRS DURATION: 92 MS
EKG QTC CALCULATION (BAZETT): 423 MS
EKG R AXIS: -10 DEGREES
EKG T AXIS: 54 DEGREES
EKG VENTRICULAR RATE: 63 BPM
ETHANOL SERPL-MCNC: < 0.01 % (ref 0–0.08)
FERRITIN SERPL IA-MCNC: 5 NG/ML (ref 10–291)
FOLATE SERPL-MCNC: 10.6 NG/ML (ref 4.8–24.2)
GFR SERPL CREATININE-BSD FRML MDRD: > 90 ML/MIN/1.73M2
GLUCOSE BLD STRIP.AUTO-MCNC: 98 MG/DL (ref 70–108)
GLUCOSE SERPL-MCNC: 95 MG/DL (ref 70–108)
HCT VFR BLD AUTO: 28.4 % (ref 37–47)
HDLC SERPL-MCNC: 56 MG/DL
HGB BLD-MCNC: 7.4 GM/DL (ref 12–16)
HGB RETIC QN AUTO: 16.8 PG (ref 28.2–35.7)
IMM RETICS NFR: 12.9 % (ref 3–15.9)
INR PPP: 1.01 (ref 0.85–1.13)
IRON SERPL-MCNC: 12 UG/DL (ref 50–170)
LACTIC ACID, SEPSIS: 0.8 MMOL/L (ref 0.5–1.9)
LDH SERPL L TO P-CCNC: 128 U/L (ref 100–190)
LDLC SERPL CALC-MCNC: 89 MG/DL
OSMOLALITY SERPL CALC.SUM OF ELEC: 267.9 MOSMOL/KG (ref 275–300)
POC CREATININE WHOLE BLOOD: 0.6 MG/DL (ref 0.5–1.2)
POTASSIUM SERPL-SCNC: 3.9 MEQ/L (ref 3.5–5.2)
PROT SERPL-MCNC: 6.9 G/DL (ref 6.1–8)
REASON FOR REJECTION: NORMAL
REASON FOR REJECTION: NORMAL
REJECTED TEST: NORMAL
REJECTED TEST: NORMAL
RETICS # AUTO: 61 THOU/MM3 (ref 20–115)
RETICS/RBC NFR AUTO: 1.5 % (ref 0.5–2)
RH FACTOR: NORMAL
SCAN OF BLOOD SMEAR: NORMAL
SODIUM SERPL-SCNC: 135 MEQ/L (ref 135–145)
T4 FREE SERPL-MCNC: 1.27 NG/DL (ref 0.93–1.68)
TIBC SERPL-MCNC: 356 UG/DL (ref 171–450)
TIBC SERPL-MCNC: 357 UG/DL (ref 171–450)
TRIGL SERPL-MCNC: 144 MG/DL (ref 0–199)
TROPONIN, HIGH SENSITIVITY: 18 NG/L (ref 0–12)
TSH SERPL DL<=0.005 MIU/L-ACNC: 4.52 UIU/ML (ref 0.4–4.2)
TSH SERPL DL<=0.005 MIU/L-ACNC: 5.1 UIU/ML (ref 0.4–4.2)
VIT B12 SERPL-MCNC: 215 PG/ML (ref 211–911)

## 2024-08-23 PROCEDURE — 85018 HEMOGLOBIN: CPT

## 2024-08-23 PROCEDURE — 82746 ASSAY OF FOLIC ACID SERUM: CPT

## 2024-08-23 PROCEDURE — 6360000002 HC RX W HCPCS: Performed by: FAMILY MEDICINE

## 2024-08-23 PROCEDURE — 93005 ELECTROCARDIOGRAM TRACING: CPT | Performed by: STUDENT IN AN ORGANIZED HEALTH CARE EDUCATION/TRAINING PROGRAM

## 2024-08-23 PROCEDURE — 82077 ASSAY SPEC XCP UR&BREATH IA: CPT

## 2024-08-23 PROCEDURE — 85730 THROMBOPLASTIN TIME PARTIAL: CPT

## 2024-08-23 PROCEDURE — 99285 EMERGENCY DEPT VISIT HI MDM: CPT

## 2024-08-23 PROCEDURE — 30233N1 TRANSFUSION OF NONAUTOLOGOUS RED BLOOD CELLS INTO PERIPHERAL VEIN, PERCUTANEOUS APPROACH: ICD-10-PCS | Performed by: STUDENT IN AN ORGANIZED HEALTH CARE EDUCATION/TRAINING PROGRAM

## 2024-08-23 PROCEDURE — 70551 MRI BRAIN STEM W/O DYE: CPT

## 2024-08-23 PROCEDURE — 6360000004 HC RX CONTRAST MEDICATION: Performed by: FAMILY MEDICINE

## 2024-08-23 PROCEDURE — 70498 CT ANGIOGRAPHY NECK: CPT

## 2024-08-23 PROCEDURE — 71045 X-RAY EXAM CHEST 1 VIEW: CPT

## 2024-08-23 PROCEDURE — 4A03X5D MEASUREMENT OF ARTERIAL FLOW, INTRACRANIAL, EXTERNAL APPROACH: ICD-10-PCS | Performed by: STUDENT IN AN ORGANIZED HEALTH CARE EDUCATION/TRAINING PROGRAM

## 2024-08-23 PROCEDURE — 80053 COMPREHEN METABOLIC PANEL: CPT

## 2024-08-23 PROCEDURE — 84484 ASSAY OF TROPONIN QUANT: CPT

## 2024-08-23 PROCEDURE — 86923 COMPATIBILITY TEST ELECTRIC: CPT

## 2024-08-23 PROCEDURE — 70450 CT HEAD/BRAIN W/O DYE: CPT

## 2024-08-23 PROCEDURE — 2060000000 HC ICU INTERMEDIATE R&B

## 2024-08-23 PROCEDURE — 82565 ASSAY OF CREATININE: CPT

## 2024-08-23 PROCEDURE — 84443 ASSAY THYROID STIM HORMONE: CPT

## 2024-08-23 PROCEDURE — 36415 COLL VENOUS BLD VENIPUNCTURE: CPT

## 2024-08-23 PROCEDURE — 85610 PROTHROMBIN TIME: CPT

## 2024-08-23 PROCEDURE — 82948 REAGENT STRIP/BLOOD GLUCOSE: CPT

## 2024-08-23 PROCEDURE — 83010 ASSAY OF HAPTOGLOBIN QUANT: CPT

## 2024-08-23 PROCEDURE — 83615 LACTATE (LD) (LDH) ENZYME: CPT

## 2024-08-23 PROCEDURE — 85046 RETICYTE/HGB CONCENTRATE: CPT

## 2024-08-23 PROCEDURE — 83036 HEMOGLOBIN GLYCOSYLATED A1C: CPT

## 2024-08-23 PROCEDURE — 99223 1ST HOSP IP/OBS HIGH 75: CPT | Performed by: STUDENT IN AN ORGANIZED HEALTH CARE EDUCATION/TRAINING PROGRAM

## 2024-08-23 PROCEDURE — 99291 CRITICAL CARE FIRST HOUR: CPT

## 2024-08-23 PROCEDURE — 83605 ASSAY OF LACTIC ACID: CPT

## 2024-08-23 PROCEDURE — 86901 BLOOD TYPING SEROLOGIC RH(D): CPT

## 2024-08-23 PROCEDURE — 83540 ASSAY OF IRON: CPT

## 2024-08-23 PROCEDURE — 86850 RBC ANTIBODY SCREEN: CPT

## 2024-08-23 PROCEDURE — 95816 EEG AWAKE AND DROWSY: CPT

## 2024-08-23 PROCEDURE — P9016 RBC LEUKOCYTES REDUCED: HCPCS

## 2024-08-23 PROCEDURE — 86900 BLOOD TYPING SEROLOGIC ABO: CPT

## 2024-08-23 PROCEDURE — 85014 HEMATOCRIT: CPT

## 2024-08-23 PROCEDURE — 70496 CT ANGIOGRAPHY HEAD: CPT

## 2024-08-23 PROCEDURE — 85025 COMPLETE CBC W/AUTO DIFF WBC: CPT

## 2024-08-23 PROCEDURE — 93010 ELECTROCARDIOGRAM REPORT: CPT | Performed by: INTERNAL MEDICINE

## 2024-08-23 PROCEDURE — 82728 ASSAY OF FERRITIN: CPT

## 2024-08-23 PROCEDURE — 80061 LIPID PANEL: CPT

## 2024-08-23 PROCEDURE — 84439 ASSAY OF FREE THYROXINE: CPT

## 2024-08-23 PROCEDURE — 83550 IRON BINDING TEST: CPT

## 2024-08-23 PROCEDURE — 95816 EEG AWAKE AND DROWSY: CPT | Performed by: PSYCHIATRY & NEUROLOGY

## 2024-08-23 PROCEDURE — 82607 VITAMIN B-12: CPT

## 2024-08-23 RX ORDER — ONDANSETRON 2 MG/ML
4 INJECTION INTRAMUSCULAR; INTRAVENOUS EVERY 6 HOURS PRN
Status: DISCONTINUED | OUTPATIENT
Start: 2024-08-23 | End: 2024-09-05 | Stop reason: HOSPADM

## 2024-08-23 RX ORDER — ASPIRIN 81 MG/1
81 TABLET, CHEWABLE ORAL DAILY
Status: DISCONTINUED | OUTPATIENT
Start: 2024-08-23 | End: 2024-08-25

## 2024-08-23 RX ORDER — SODIUM CHLORIDE 0.9 % (FLUSH) 0.9 %
5-40 SYRINGE (ML) INJECTION EVERY 12 HOURS SCHEDULED
Status: DISCONTINUED | OUTPATIENT
Start: 2024-08-23 | End: 2024-09-05 | Stop reason: HOSPADM

## 2024-08-23 RX ORDER — POTASSIUM CHLORIDE 1500 MG/1
40 TABLET, EXTENDED RELEASE ORAL PRN
Status: DISCONTINUED | OUTPATIENT
Start: 2024-08-23 | End: 2024-09-05 | Stop reason: HOSPADM

## 2024-08-23 RX ORDER — POLYETHYLENE GLYCOL 3350 17 G/17G
17 POWDER, FOR SOLUTION ORAL DAILY PRN
Status: DISCONTINUED | OUTPATIENT
Start: 2024-08-23 | End: 2024-09-05 | Stop reason: HOSPADM

## 2024-08-23 RX ORDER — MAGNESIUM SULFATE IN WATER 40 MG/ML
2000 INJECTION, SOLUTION INTRAVENOUS PRN
Status: DISCONTINUED | OUTPATIENT
Start: 2024-08-23 | End: 2024-09-05 | Stop reason: HOSPADM

## 2024-08-23 RX ORDER — CLOPIDOGREL BISULFATE 75 MG/1
75 TABLET ORAL DAILY
Status: DISCONTINUED | OUTPATIENT
Start: 2024-08-23 | End: 2024-09-05 | Stop reason: HOSPADM

## 2024-08-23 RX ORDER — LORAZEPAM 2 MG/ML
1 INJECTION INTRAMUSCULAR ONCE
Status: COMPLETED | OUTPATIENT
Start: 2024-08-23 | End: 2024-08-23

## 2024-08-23 RX ORDER — POTASSIUM CHLORIDE 7.45 MG/ML
10 INJECTION INTRAVENOUS PRN
Status: DISCONTINUED | OUTPATIENT
Start: 2024-08-23 | End: 2024-09-05 | Stop reason: HOSPADM

## 2024-08-23 RX ORDER — ACETAMINOPHEN 650 MG/1
650 SUPPOSITORY RECTAL EVERY 6 HOURS PRN
Status: DISCONTINUED | OUTPATIENT
Start: 2024-08-23 | End: 2024-09-05 | Stop reason: HOSPADM

## 2024-08-23 RX ORDER — SODIUM CHLORIDE 0.9 % (FLUSH) 0.9 %
5-40 SYRINGE (ML) INJECTION PRN
Status: DISCONTINUED | OUTPATIENT
Start: 2024-08-23 | End: 2024-09-05 | Stop reason: HOSPADM

## 2024-08-23 RX ORDER — ONDANSETRON 4 MG/1
4 TABLET, ORALLY DISINTEGRATING ORAL EVERY 8 HOURS PRN
Status: DISCONTINUED | OUTPATIENT
Start: 2024-08-23 | End: 2024-09-05 | Stop reason: HOSPADM

## 2024-08-23 RX ORDER — IOPAMIDOL 755 MG/ML
80 INJECTION, SOLUTION INTRAVASCULAR
Status: COMPLETED | OUTPATIENT
Start: 2024-08-23 | End: 2024-08-23

## 2024-08-23 RX ORDER — ACETAMINOPHEN 325 MG/1
650 TABLET ORAL EVERY 6 HOURS PRN
Status: DISCONTINUED | OUTPATIENT
Start: 2024-08-23 | End: 2024-09-05 | Stop reason: HOSPADM

## 2024-08-23 RX ORDER — SODIUM CHLORIDE 9 MG/ML
INJECTION, SOLUTION INTRAVENOUS PRN
Status: DISCONTINUED | OUTPATIENT
Start: 2024-08-23 | End: 2024-09-05 | Stop reason: HOSPADM

## 2024-08-23 RX ORDER — ATORVASTATIN CALCIUM 40 MG/1
40 TABLET, FILM COATED ORAL NIGHTLY
Status: DISCONTINUED | OUTPATIENT
Start: 2024-08-23 | End: 2024-09-05 | Stop reason: HOSPADM

## 2024-08-23 RX ADMIN — IOPAMIDOL 80 ML: 755 INJECTION, SOLUTION INTRAVENOUS at 12:22

## 2024-08-23 RX ADMIN — LORAZEPAM 1 MG: 2 INJECTION, SOLUTION INTRAMUSCULAR; INTRAVENOUS at 14:35

## 2024-08-23 NOTE — ED NOTES
ED to inpatient nurses report      Chief Complaint:  Chief Complaint   Patient presents with    Altered Mental Status     Present to ED from: home    MOA:     LOC: alert to only name  Mobility: Requires assistance * 1  Oxygen Baseline: RA    Current needs required: RA     Code Status:   Prior    What abnormal results were found and what did you give/do to treat them? Hgb 7.0  Any procedures or intervention occur? None; will get EEG    Mental Status:  Level of Consciousness: Alert (0)    Psych Assessment:        Vitals:  Patient Vitals for the past 24 hrs:   BP Temp Temp src Pulse Resp SpO2 Height Weight   08/23/24 1345 135/75 -- -- 58 20 98 % -- --   08/23/24 1300 (!) 156/78 -- -- 64 19 100 % 1.524 m (5') --   08/23/24 1208 (!) 160/59 98.1 °F (36.7 °C) Oral 59 11 95 % -- 58.1 kg (128 lb)        LDAs:   Peripheral IV 08/23/24 Left Antecubital (Active)       Ambulatory Status:  No data recorded    Diagnosis:  DISPOSITION Admitted 08/23/2024 02:08:36 PM   Final diagnoses:   Altered mental status, unspecified altered mental status type   Confusion   Iron deficiency anemia, unspecified iron deficiency anemia type        Consults:  IP CONSULT TO NEUROLOGY     Pain Score:       C-SSRS:        Sepsis Screening:       Maya Fall Risk:       Swallow Screening        Preferred Language:   English      ALLERGIES     Patient has no known allergies.    SURGICAL HISTORY       Past Surgical History:   Procedure Laterality Date    CATARACT REMOVAL         PAST MEDICAL HISTORY       Past Medical History:   Diagnosis Date    Ascending aortic aneurysm (HCC)     Stroke (HCC) 11/1/14    Unspecified cerebral artery occlusion with cerebral infarction            Electronically signed by Mariah Marie RN on 8/23/2024 at 2:09 PM

## 2024-08-23 NOTE — PROGRESS NOTES
Patient unavailable for EEG.  In MRI.  Will try later.  JASON Hernández states Ativan just given.  Will check later.

## 2024-08-23 NOTE — PROGRESS NOTES
[facility]     Neurodiagnostic Laboratory Technician Worksheet      EEG Date: 2024    Name: Mila Andrade  : 1950  Age: 74 y.o.  Sex: female  MRN: [id number]  CSN: [id number]    Room/Location: [address] [place] [address]  Ordering Provider: Darlene    EEG Number: [id number]    Time In: 1741  Time Out: 180  Total Treatment Time: 20    Clinical History: mumbling, no command follow,  staring, altered mental status, garbled speech,  Mri     IMPRESSION:     1. Acute infarcts in the posterior right frontal lobe and right corona radiata.  There are also acute infarcts in the deep white matter of the right parietal  lobe and punctate infarcts in both cerebellar hemispheres.  2. Old infarcts on the left in the corona radiata, left frontal lobe, left  parietal lobe, left temporal and left occipital lobes.  3. Mild severity chronic small vessel ischemic changes.    Hand Dominance: Right   Sedation: No   H.V. Completed: No, age protocol   Photic: No   Sleep: Yes   Drowsy: No   Sleep Deprived: No   Seizures Observed: No   Mentality: CONFSUED,  CONTINUOUS REDIRECTED     Technician: Santa Ramirez 2024

## 2024-08-23 NOTE — PROCEDURES
EEG REPORT       Patient: Mila Andrade Age: 74 y.o.  MRN: [id number]      Referring Provider: No ref. provider found    History: This routine 20 minute scalp EEG was recorded for a 74 y.o.. female  who presented with AMS, found to have stroke. This EEG was performed to evaluate for focal and epileptiform abnormalities.     Mila Andrade   Current Facility-Administered Medications   Medication Dose Route Frequency Provider Last Rate Last Admin    sodium chloride flush 0.9 % injection 5-40 mL  5-40 mL IntraVENous 2 times per day Antalis, Geremias, DO        sodium chloride flush 0.9 % injection 5-40 mL  5-40 mL IntraVENous PRN Antalis, Geremias, DO        0.9 % sodium chloride infusion   IntraVENous PRN Antalis, Geremias, DO        potassium chloride (KLOR-CON M) extended release tablet 40 mEq  40 mEq Oral PRN Antalis, Geremias, DO        Or    potassium bicarb-citric acid (EFFER-K) effervescent tablet 40 mEq  40 mEq Oral PRN Antalis, Geremias, DO        Or    potassium chloride 10 mEq/100 mL IVPB (Peripheral Line)  10 mEq IntraVENous PRN Antalis, Geremias, DO        magnesium sulfate 2000 mg in 50 mL IVPB premix  2,000 mg IntraVENous PRN Antalis, Geremias, DO        ondansetron (ZOFRAN-ODT) disintegrating tablet 4 mg  4 mg Oral Q8H PRN Antmaria del carmen, Geremias, DO        Or    ondansetron (ZOFRAN) injection 4 mg  4 mg IntraVENous Q6H PRN Antalis, Geremias, DO        polyethylene glycol (GLYCOLAX) packet 17 g  17 g Oral Daily PRN Antmaria del carmen, Geremias, DO        acetaminophen (TYLENOL) tablet 650 mg  650 mg Oral Q6H PRN Antalis, Geremias, DO        Or    acetaminophen (TYLENOL) suppository 650 mg  650 mg Rectal Q6H PRN Antalis, Geremias, DO        aspirin chewable tablet 81 mg  81 mg Oral Daily Antmaria del carmen, Geremias, DO        clopidogrel (PLAVIX) tablet 75 mg  75 mg Oral Daily AntGeremias joyce, DO        atorvastatin (LIPITOR) tablet 40 mg  40 mg Oral Nightly AntGeremias joyce, DO            Technical Description: This is a 21 channel digital

## 2024-08-23 NOTE — CONSULTS
revealed no acute intracranial abnormality.  CTA of head and neck revealed multiple areas of intracranial stenosis. No need for carotid ultrasound.  MRI of brain without contrast revealed bilateral infarcts.  Stat CT head is needed if the patient develops new-onset altered mental status, a severe headache, or new-onset neurologic deficit  Risk factors and medications  Blood pressure goal: permissive hypertension up to 220/120 for 24 hours after stroke onset. following this period, less than 130/80.  Keep well hydrated. Initiate normal saline at 75 ml/hr as needed.  Antithrombotics: begin Aspirin 81mg and Plavix 75mg.  HgbA1C in the morning. If the patient has diabetes, recommend tight control of A1c with goal of <7.0 if attainable.   Lipid panel in the morning. LDL goal of 45-70. Continue Lipitor 40mg. Will adjust based on lipid panel.  Core stroke metrics  PT/OT/SLP consult. IPR consult if applicable.  NIHSS every shift. Neuro checks per unit unless otherwise specified.  Pre-morbid Modified Duke Scale: 2 - Slight disability:  unable to carry out all previous activities, but able to look after own affairs without assistance.  Patient should be transferred to  for further stroke care.    Pulmonary:    SpO2 greater than 94%     Cardiovascular:  2D Echo with bubble study, ordered  Maintain telemetry, monitor for atrial-fib  EKG and cardiac enzymes x1 now.     Renal:  Start 0.9% Saline IV at 75 mL/hr.  Monitor and replete electrolytes as necessary.     GI:  Dysphagia screen prior to oral intake.  NPO for now pending swallow evaluation.  Hemoglobin low at 7.0. Recommend treatment per primary team.     Id:  Monitor for infection  Urinalysis with Reflex  Chest XRay     Prophylaxis:  SCDs   Smoking and alcohol cessation when applicable. Provide stroke eduction for individualized risk factors.        This is a critically ill patient with impairment of vital organ system requiring high complexity decision making to

## 2024-08-23 NOTE — ED TRIAGE NOTES
Pt to ED with  c/o AMS. GEOFF 3/22/24 @ 2100.  reports went to bed last night at baseline with no deficits from previous stroke. States she woke up around 1100 and was mumbling and unable to follow commands. FSBS 98. IV access established. EKG completed. Bed scale weight 128.0 lb. NIH 3. Dr Arrieta at bedside for pt assessment.

## 2024-08-23 NOTE — PROGRESS NOTES
Stroke Folder given.   What is Stroke/CVA  Signs and Symptoms of stroke (BEFAST)  Treatments for Stroke  Personal Risk Factors for Stroke discussed  Education--Call 911     Stroke Folder given.   What is Stroke/CVA  Signs and Symptoms of stroke (BEFAST)  Treatments for Stroke  Personal Risk Factors for Stroke discussed  Education--Call 911      Patient/family has been educated on their personal risk factors of:  X Hypertension  X Previous stroke  X Carotid Artery stenosis      They have been given hand outs on the following medications:(give handouts/attach to AVS)  X asa  X Plavix  X statins(Lipitor)      Treatment for stroke includes:  Risk factor modifications  Following the medication regime prescribed by physician      Educated on FAST-Face-Arm-Speech-Time    A stroke is a brain attack.Stroke is a brain injury. It occurs when the brain's blood supply is interrupted. Blood carries oxygen and nutrients to the brain. Without oxygen and nutrients from blood, brain tissue starts to die rapidly. This can happen in less than 10 minutes.    A stroke occurs when blood flow to the brain is blocked (called ischemic stroke). This is caused by one of the following:   Sudden decreased blood flow   Damage to a blood vessel supplying blood to the brain can occur suddenly from either:   Injury   A clot that forms and breaks off from another part of the body (such as the heart or neck)   There are certain conditions which predispose people to form blood clots, such as:   Cancer   Pregnancy   Atrial fibrillation   Certain autoimmune diseases   Local blood clot   A build-up of fatty substances ( atherosclerotic plaque ) along the inner lining of the artery causes:   Narrowing of artery   Reduced elasticity   Local inflammation   Blood protein defects leading to increased clotting tendency   Decreased blood flow in the artery   Clot in an artery supplying the brain   Inflammatory conditions in the blood vessels (vasculitis)   A

## 2024-08-23 NOTE — ED PROVIDER NOTES
EMERGENCY MEDICINE DEPARTMENT ENCOUNTER      CHIEF COMPLAINT    Chief Complaint   Patient presents with    Altered Mental Status       HPI    Mila Andrade is a 74 y.o. female with prior hx of CVA, AAA, alcohol dependence, who presents with generalized acute confusion since the onset 9 pm last evening. The duration has been constant since the onset, per  Isaias. The generalized confusion was associated with increased unresponsiveness, muteness, decreased speech.  No antecedent trauma was endorsed by pt. No aggravating or alleviating factors.    See ROS below for additional pertinent negatives.    Stroke activation overhead, around 12:18 (please check exact time with RN notes).    REVIEW OF SYSTEMS    Cardiac: No chest pain or palpitations  Respiratory: No shortness of breath or new cough  General: No fevers   : No dysuria or hematuria  GI: No vomiting or diarrhea  See HPI for further details.  All other systems reviewed and are negative.    PAST MEDICAL OR SURGICAL HISTORY    Past Medical History:   Diagnosis Date    Ascending aortic aneurysm (HCC)     Stroke (HCC) 11/1/14    Unspecified cerebral artery occlusion with cerebral infarction      Past Surgical History:   Procedure Laterality Date    CATARACT REMOVAL         CURRENT MEDICATIONS          ALLERGIES    No Known Allergies    FAMILY OR SOCIAL HISTORY    Family History   Problem Relation Age of Onset    Other Mother         CHF    Other Father         smoker--COPD    Stroke Sister 58     Social History     Socioeconomic History    Marital status:      Spouse name: Anil    Number of children: 1    Years of education: Not on file    Highest education level: Not on file   Occupational History    Not on file   Tobacco Use    Smoking status: Never    Smokeless tobacco: Never   Vaping Use    Vaping status: Never Used   Substance and Sexual Activity    Alcohol use: Yes     Comment: 4 Bud lights 5 days a week    Drug use: No    Sexual activity: Not on

## 2024-08-23 NOTE — ED NOTES
Pt back to room from CT and Xray. MRI screening completed. Pt rings and bracelets removed and placed into a specimen cup. Cup placed with pt belongings and given to .

## 2024-08-23 NOTE — H&P
Hospitalist - History & Physical      Patient: Mila Andrade    Unit/Bed:03/003A  YOB: 1950  MRN: [id number]   Acct: [id number]   PCP: Saundra Nelson APRN - CNP    Date of Service: Pt seen/examined on 08/23/24  and Admitted to [Inpatient] with expected LOS [greater than] two midnights due to medical therapy.     Chief Complaint:  AMS, anemia    Assessment and Plan:-  Symptomatic anemia  Microcytic anemia with concern for iron deficiency  Hgb dropped from 11.2 to 7.0 over the last year.   Send iron studies, anemia work up  Will need colonoscopy on a non-urgent basis unless hgb continues to trend down.   Check H&GH this evening.  Transfuse prn Hgb/hct >7/21  Acute encephalopathy  Prior CVA, 11/2014  Initially presented as a stroke alert.  Has been evaluated by neurology surgery.  LNK 9PM last night.  Not a TNK candidate.   Continue Aspirin.  Start plavix, lipitor  Check lipid panel, TSH, A1c  NPO pending dysphagia screen.   MRI brain pending at this time.   Check echo  PT/OT/SLP evaluations  CTA head/neck did show stenosis of the right M1, stable occluded left MCA and left P1 segment with worsening atherosclerosis of the left carotid bulb.  Also noted a missing anterior segment of the right M2 branch MCA felt to not be new by neurology.      Disposition: Anticipate DC home in 24-48 hours pending CVA work up and stabilization of her anemia.      History Of Present Illness:    Patient is a very pleasant 74-year-old female with medical history of prior stroke from home.  The patient has garbled speech is unable to contribute much to the history thus history is obtained from the patient's  is present at bedside.  He states that she was in her normal state of health approxi-9 PM return to bed last night.  He states this morning, she has been confused and in a \"daze\".  He states that she walked in the kitchen to make coffee, and then forgot everything that she was doing.  He states that she then  CONTRAST CLINICAL INFORMATION: Stroke Symptoms. COMPARISON: CT scan of the brain dated 7/23/2023.. TECHNIQUE: 5 mm axial images were obtained through the brain before and after the administration of intravenous contrast. Sagittal and coronal reconstructions were obtained. All CT scans at this facility use dose modulation, iterative reconstruction, and/or weight-based dosing when appropriate to reduce radiation dose to as low as reasonably achievable. FINDINGS: There is mild atrophy. There is encephalomalacia especially in the left occipital and left posterior temporal lobes. There is no hemorrhage.  There are no intra-or extra-axial collections.  There is no hydrocephalus, midline shift or mass effect.  .  The paranasal sinuses and mastoid air cells are normally aerated.  There is no suspicious calvarial abnormality.     1. Mild atrophy and encephalomalacia especially in the left occipital and left posterior temporal lobes. 2. Otherwise negative noncontrast CT scan of the brain. 3. Results called to the stroke team at 12:35 p.m. **This report has been created using voice recognition software. It may contain minor errors which are inherent in voice recognition technology.** Electronically signed by Dr. Ksenia Cardenas        Electronically signed by Geremias Thomas DO on 8/23/2024 at 2:43 PM

## 2024-08-23 NOTE — CONSENT
Informed Consent for Blood Component Transfusion Note    I have discussed with the  the rationale for blood component transfusion; its benefits in treating or preventing fatigue, organ damage, or death; and its risk which includes mild transfusion reactions, rare risk of blood borne infection, or more serious but rare reactions. I have discussed the alternatives to transfusion, including the risk and consequences of not receiving transfusion. The  had an opportunity to ask questions and had agreed to proceed with transfusion of blood components.    Electronically signed by Geremias Thomas DO on 8/23/24 at 3:57 PM EDT

## 2024-08-23 NOTE — NURSE NAVIGATOR
Code Stroke    Patient- Mila Andrade   A   2024   Attending Physician- Huan Arrieta MD    Code stroke called for sudden AMS  Physician arrival- 1215  Team members   Primary RN- Chasity   Neuro Team- Horacio Valera RN   RR Team- Darrin Barriga- Mariana GOMEZ     Last Known Well- 2100     Chem stick- 98     Time to CT scan- 1221     INITIAL NIH STROKE SCALE    Time Performed:  1223    1a.  Level of consciousness:  0 - alert; keenly responsive  1b.  Level of consciousness questions:  1 - answers one question correctly  1c.  Level of consciousness questions:  1 - performs one task correctly  2.    Best Gaze:  0 - normal  3.    Visual:  0 - no visual loss  4.    Facial Palsy:  0 - normal symmetric movement  5a.  Motor left arm:  0 - no drift, limb holds 90 (or 45) degrees for full 10 seconds  5b.  Motor right arm:  0 - no drift, limb holds 90 (or 45) degrees for full 10 seconds  6a.  Motor left le - no drift; leg holds 30 degree position for full 5 seconds  6b.  Motor right le - no drift; leg holds 30 degree position for full 5 seconds  7.    Limb Ataxia:  0 - absent  8.    Sensory:  0 - normal; no sensory loss  9.    Best Language:  0 - no aphasia, normal  10.  Dysarthria:  1 - mild to moderate, patient slurs at least some words and at worst, can be understood with some difficulty  11.  Extinction and Inattention:  0 - no abnormality    TOTAL:  3    Outcome- return to ED for further work up    Primary RNChasity, updated on POC and need for swallow screen before PO intake.       Electronically signed by Horacio Harding RN, RN on 2024 at 1:14 PM

## 2024-08-23 NOTE — PROGRESS NOTES
Pt made it to 6K at this time.  Accompanied by .  Pt stated she needed to go and proceeded to stand an urinate on floor.  Pt's  stated she was fine when she went to bed last night but woke up \"goofy\".

## 2024-08-24 ENCOUNTER — APPOINTMENT (OUTPATIENT)
Age: 74
End: 2024-08-24
Attending: STUDENT IN AN ORGANIZED HEALTH CARE EDUCATION/TRAINING PROGRAM
Payer: MEDICARE

## 2024-08-24 ENCOUNTER — APPOINTMENT (OUTPATIENT)
Dept: CT IMAGING | Age: 74
End: 2024-08-24
Payer: MEDICARE

## 2024-08-24 LAB
ALBUMIN SERPL BCG-MCNC: 3.8 G/DL (ref 3.5–5.1)
ALP SERPL-CCNC: 58 U/L (ref 38–126)
ALT SERPL W/O P-5'-P-CCNC: < 5 U/L (ref 11–66)
AMPHETAMINES UR QL SCN: NEGATIVE
ANION GAP SERPL CALC-SCNC: 11 MEQ/L (ref 8–16)
AST SERPL-CCNC: 8 U/L (ref 5–40)
BARBITURATES UR QL SCN: NEGATIVE
BASOPHILS ABSOLUTE: 0 THOU/MM3 (ref 0–0.1)
BASOPHILS ABSOLUTE: 0 THOU/MM3 (ref 0–0.1)
BASOPHILS NFR BLD AUTO: 0.3 %
BASOPHILS NFR BLD AUTO: 0.9 %
BENZODIAZ UR QL SCN: POSITIVE
BILIRUB SERPL-MCNC: 0.4 MG/DL (ref 0.3–1.2)
BILIRUB UR QL STRIP.AUTO: NEGATIVE
BUN SERPL-MCNC: 7 MG/DL (ref 7–22)
BZE UR QL SCN: NEGATIVE
CALCIUM SERPL-MCNC: 8.5 MG/DL (ref 8.5–10.5)
CANNABINOIDS UR QL SCN: NEGATIVE
CHARACTER UR: CLEAR
CHLORIDE SERPL-SCNC: 105 MEQ/L (ref 98–111)
CO2 SERPL-SCNC: 23 MEQ/L (ref 23–33)
COLOR, UA: YELLOW
CREAT SERPL-MCNC: 0.6 MG/DL (ref 0.4–1.2)
DEPRECATED MEAN GLUCOSE BLD GHB EST-ACNC: NORMAL MG/DL (ref 70–126)
DEPRECATED RDW RBC AUTO: 42.6 FL (ref 35–45)
DEPRECATED RDW RBC AUTO: 43.2 FL (ref 35–45)
ECHO AO ASC DIAM: 4.3 CM
ECHO AO ASCENDING AORTA INDEX: 2.79 CM/M2
ECHO AR MAX VEL PISA: 4.4 M/S
ECHO AV CUSP MM: 1.4 CM
ECHO AV MEAN GRADIENT: 7 MMHG
ECHO AV MEAN VELOCITY: 1.2 M/S
ECHO AV PEAK GRADIENT: 14 MMHG
ECHO AV PEAK VELOCITY: 1.9 M/S
ECHO AV REGURGITANT PHT: 459 MS
ECHO AV VELOCITY RATIO: 0.89
ECHO AV VTI: 38.7 CM
ECHO BSA: 1.57 M2
ECHO EST RA PRESSURE: 3 MMHG
ECHO LA AREA 2C: 16.7 CM2
ECHO LA AREA 4C: 19 CM2
ECHO LA DIAMETER INDEX: 2.53 CM/M2
ECHO LA DIAMETER: 3.9 CM
ECHO LA MAJOR AXIS: 5.7 CM
ECHO LA MINOR AXIS: 4.7 CM
ECHO LA VOL BP: 53 ML (ref 22–52)
ECHO LA VOL MOD A2C: 48 ML (ref 22–52)
ECHO LA VOL MOD A4C: 49 ML (ref 22–52)
ECHO LA VOL/BSA BIPLANE: 34 ML/M2 (ref 16–34)
ECHO LA VOLUME INDEX MOD A2C: 31 ML/M2 (ref 16–34)
ECHO LA VOLUME INDEX MOD A4C: 32 ML/M2 (ref 16–34)
ECHO LV E' LATERAL VELOCITY: 7 CM/S
ECHO LV E' SEPTAL VELOCITY: 7 CM/S
ECHO LV EDV A2C: 60 ML
ECHO LV EDV A4C: 68 ML
ECHO LV EDV INDEX A4C: 44 ML/M2
ECHO LV EDV NDEX A2C: 39 ML/M2
ECHO LV EJECTION FRACTION A2C: 59 %
ECHO LV EJECTION FRACTION A4C: 65 %
ECHO LV EJECTION FRACTION BIPLANE: 61 % (ref 55–100)
ECHO LV ESV A2C: 24 ML
ECHO LV ESV A4C: 24 ML
ECHO LV ESV INDEX A2C: 16 ML/M2
ECHO LV ESV INDEX A4C: 16 ML/M2
ECHO LV FRACTIONAL SHORTENING: 31 % (ref 28–44)
ECHO LV INTERNAL DIMENSION DIASTOLE INDEX: 3.38 CM/M2
ECHO LV INTERNAL DIMENSION DIASTOLIC: 5.2 CM (ref 3.9–5.3)
ECHO LV INTERNAL DIMENSION SYSTOLIC INDEX: 2.34 CM/M2
ECHO LV INTERNAL DIMENSION SYSTOLIC: 3.6 CM
ECHO LV ISOVOLUMETRIC RELAXATION TIME (IVRT): 99 MS
ECHO LV IVSD: 0.8 CM (ref 0.6–0.9)
ECHO LV MASS 2D: 156.9 G (ref 67–162)
ECHO LV MASS INDEX 2D: 101.9 G/M2 (ref 43–95)
ECHO LV POSTERIOR WALL DIASTOLIC: 0.9 CM (ref 0.6–0.9)
ECHO LV RELATIVE WALL THICKNESS RATIO: 0.35
ECHO LVOT AV VTI INDEX: 0.86
ECHO LVOT MEAN GRADIENT: 4 MMHG
ECHO LVOT PEAK GRADIENT: 11 MMHG
ECHO LVOT PEAK VELOCITY: 1.7 M/S
ECHO LVOT VTI: 33.4 CM
ECHO MV A VELOCITY: 1.16 M/S
ECHO MV E DECELERATION TIME (DT): 269 MS
ECHO MV E VELOCITY: 0.92 M/S
ECHO MV E/A RATIO: 0.79
ECHO MV E/E' LATERAL: 13.14
ECHO MV E/E' RATIO (AVERAGED): 13.14
ECHO MV E/E' SEPTAL: 13.14
ECHO MV REGURGITANT PEAK GRADIENT: 71 MMHG
ECHO MV REGURGITANT PEAK VELOCITY: 4.2 M/S
ECHO PULMONARY ARTERY END DIASTOLIC PRESSURE: 5 MMHG
ECHO PV MAX VELOCITY: 0.8 M/S
ECHO PV PEAK GRADIENT: 2 MMHG
ECHO PV REGURGITANT MAX VELOCITY: 1.1 M/S
ECHO RIGHT VENTRICULAR SYSTOLIC PRESSURE (RVSP): 34 MMHG
ECHO RV INTERNAL DIMENSION: 2.8 CM
ECHO RV TAPSE: 1.8 CM (ref 1.7–?)
ECHO TV E WAVE: 0.7 M/S
ECHO TV REGURGITANT MAX VELOCITY: 2.78 M/S
ECHO TV REGURGITANT PEAK GRADIENT: 31 MMHG
ELLIPTOCYTES: ABNORMAL
EOSINOPHIL NFR BLD AUTO: 0.3 %
EOSINOPHIL NFR BLD AUTO: 0.9 %
EOSINOPHILS ABSOLUTE: 0 THOU/MM3 (ref 0–0.4)
EOSINOPHILS ABSOLUTE: 0 THOU/MM3 (ref 0–0.4)
ERYTHROCYTE [DISTWIDTH] IN BLOOD BY AUTOMATED COUNT: 18.6 % (ref 11.5–14.5)
ERYTHROCYTE [DISTWIDTH] IN BLOOD BY AUTOMATED COUNT: 18.6 % (ref 11.5–14.5)
FENTANYL: NEGATIVE
GFR SERPL CREATININE-BSD FRML MDRD: > 90 ML/MIN/1.73M2
GLUCOSE BLD STRIP.AUTO-MCNC: 98 MG/DL (ref 70–108)
GLUCOSE SERPL-MCNC: 88 MG/DL (ref 70–108)
GLUCOSE UR QL STRIP.AUTO: NEGATIVE MG/DL
HBA1C MFR BLD HPLC: NORMAL % (ref 4.4–6.4)
HCT VFR BLD AUTO: 25 % (ref 37–47)
HCT VFR BLD AUTO: 26.2 % (ref 37–47)
HCT VFR BLD AUTO: 26.3 % (ref 37–47)
HCT VFR BLD AUTO: 26.8 % (ref 37–47)
HGB BLD-MCNC: 6.7 GM/DL (ref 12–16)
HGB BLD-MCNC: 7 GM/DL (ref 12–16)
HGB BLD-MCNC: 7.2 GM/DL (ref 12–16)
HGB BLD-MCNC: 7.3 GM/DL (ref 12–16)
HGB UR QL STRIP.AUTO: NEGATIVE
HYPOCHROMIA BLD QL SMEAR: PRESENT
HYPOCHROMIA BLD QL SMEAR: PRESENT
IMM GRANULOCYTES # BLD AUTO: 0 THOU/MM3 (ref 0–0.07)
IMM GRANULOCYTES # BLD AUTO: 0.01 THOU/MM3 (ref 0–0.07)
IMM GRANULOCYTES NFR BLD AUTO: 0 %
IMM GRANULOCYTES NFR BLD AUTO: 0.3 %
KETONES UR QL STRIP.AUTO: NEGATIVE
LYMPHOCYTES ABSOLUTE: 0.4 THOU/MM3 (ref 1–4.8)
LYMPHOCYTES ABSOLUTE: 0.7 THOU/MM3 (ref 1–4.8)
LYMPHOCYTES NFR BLD AUTO: 15.5 %
LYMPHOCYTES NFR BLD AUTO: 20 %
MCH RBC QN AUTO: 17.2 PG (ref 26–33)
MCH RBC QN AUTO: 17.5 PG (ref 26–33)
MCHC RBC AUTO-ENTMCNC: 26.7 GM/DL (ref 32.2–35.5)
MCHC RBC AUTO-ENTMCNC: 26.8 GM/DL (ref 32.2–35.5)
MCV RBC AUTO: 64.4 FL (ref 81–99)
MCV RBC AUTO: 65.3 FL (ref 81–99)
MICROCYTES BLD QL SMEAR: PRESENT
MICROCYTES BLD QL SMEAR: PRESENT
MONOCYTES ABSOLUTE: 0.2 THOU/MM3 (ref 0.4–1.3)
MONOCYTES ABSOLUTE: 0.4 THOU/MM3 (ref 0.4–1.3)
MONOCYTES NFR BLD AUTO: 10.1 %
MONOCYTES NFR BLD AUTO: 7.2 %
NEUTROPHILS ABSOLUTE: 2.2 THOU/MM3 (ref 1.8–7.7)
NEUTROPHILS ABSOLUTE: 2.4 THOU/MM3 (ref 1.8–7.7)
NEUTROPHILS NFR BLD AUTO: 67.8 %
NEUTROPHILS NFR BLD AUTO: 76.7 %
NITRITE UR QL STRIP: NEGATIVE
NRBC BLD AUTO-RTO: 0 /100 WBC
NRBC BLD AUTO-RTO: 0 /100 WBC
OPIATES UR QL SCN: NEGATIVE
OXYCODONE: NEGATIVE
PATHOLOGIST REVIEW: ABNORMAL
PCP UR QL SCN: NEGATIVE
PH UR STRIP.AUTO: 5.5 [PH] (ref 5–9)
PLATELET # BLD AUTO: 149 THOU/MM3 (ref 130–400)
PLATELET # BLD AUTO: 151 THOU/MM3 (ref 130–400)
PMV BLD AUTO: ABNORMAL FL (ref 9.4–12.4)
PMV BLD AUTO: ABNORMAL FL (ref 9.4–12.4)
POTASSIUM SERPL-SCNC: 3.7 MEQ/L (ref 3.5–5.2)
PROT SERPL-MCNC: 5.9 G/DL (ref 6.1–8)
PROT UR STRIP.AUTO-MCNC: NEGATIVE MG/DL
RBC # BLD AUTO: 3.83 MILL/MM3 (ref 4.2–5.4)
RBC # BLD AUTO: 4.07 MILL/MM3 (ref 4.2–5.4)
SODIUM SERPL-SCNC: 139 MEQ/L (ref 135–145)
SP GR UR REFRACT.AUTO: > 1.03 (ref 1–1.03)
UROBILINOGEN, URINE: 0.2 EU/DL (ref 0–1)
WBC # BLD AUTO: 2.9 THOU/MM3 (ref 4.8–10.8)
WBC # BLD AUTO: 3.5 THOU/MM3 (ref 4.8–10.8)
WBC #/AREA URNS HPF: NEGATIVE /[HPF]

## 2024-08-24 PROCEDURE — 6360000002 HC RX W HCPCS: Performed by: INTERNAL MEDICINE

## 2024-08-24 PROCEDURE — 2060000000 HC ICU INTERMEDIATE R&B

## 2024-08-24 PROCEDURE — 82948 REAGENT STRIP/BLOOD GLUCOSE: CPT

## 2024-08-24 PROCEDURE — 99232 SBSQ HOSP IP/OBS MODERATE 35: CPT

## 2024-08-24 PROCEDURE — 85014 HEMATOCRIT: CPT

## 2024-08-24 PROCEDURE — 2580000003 HC RX 258: Performed by: INTERNAL MEDICINE

## 2024-08-24 PROCEDURE — 70450 CT HEAD/BRAIN W/O DYE: CPT

## 2024-08-24 PROCEDURE — 80307 DRUG TEST PRSMV CHEM ANLYZR: CPT

## 2024-08-24 PROCEDURE — 36415 COLL VENOUS BLD VENIPUNCTURE: CPT

## 2024-08-24 PROCEDURE — 81003 URINALYSIS AUTO W/O SCOPE: CPT

## 2024-08-24 PROCEDURE — 36430 TRANSFUSION BLD/BLD COMPNT: CPT

## 2024-08-24 PROCEDURE — 93306 TTE W/DOPPLER COMPLETE: CPT

## 2024-08-24 PROCEDURE — 85025 COMPLETE CBC W/AUTO DIFF WBC: CPT

## 2024-08-24 PROCEDURE — 94761 N-INVAS EAR/PLS OXIMETRY MLT: CPT

## 2024-08-24 PROCEDURE — 99233 SBSQ HOSP IP/OBS HIGH 50: CPT | Performed by: INTERNAL MEDICINE

## 2024-08-24 PROCEDURE — 93306 TTE W/DOPPLER COMPLETE: CPT | Performed by: INTERNAL MEDICINE

## 2024-08-24 PROCEDURE — 2580000003 HC RX 258: Performed by: STUDENT IN AN ORGANIZED HEALTH CARE EDUCATION/TRAINING PROGRAM

## 2024-08-24 PROCEDURE — 85018 HEMOGLOBIN: CPT

## 2024-08-24 PROCEDURE — 80053 COMPREHEN METABOLIC PANEL: CPT

## 2024-08-24 PROCEDURE — 51798 US URINE CAPACITY MEASURE: CPT

## 2024-08-24 RX ORDER — SODIUM CHLORIDE, SODIUM LACTATE, POTASSIUM CHLORIDE, CALCIUM CHLORIDE 600; 310; 30; 20 MG/100ML; MG/100ML; MG/100ML; MG/100ML
INJECTION, SOLUTION INTRAVENOUS CONTINUOUS
Status: DISCONTINUED | OUTPATIENT
Start: 2024-08-24 | End: 2024-08-31

## 2024-08-24 RX ORDER — SODIUM CHLORIDE 9 MG/ML
INJECTION, SOLUTION INTRAVENOUS PRN
Status: DISCONTINUED | OUTPATIENT
Start: 2024-08-24 | End: 2024-08-31

## 2024-08-24 RX ORDER — LORAZEPAM 2 MG/ML
1 INJECTION INTRAMUSCULAR ONCE
Status: COMPLETED | OUTPATIENT
Start: 2024-08-24 | End: 2024-08-24

## 2024-08-24 RX ORDER — PANTOPRAZOLE SODIUM 40 MG/10ML
40 INJECTION, POWDER, LYOPHILIZED, FOR SOLUTION INTRAVENOUS 2 TIMES DAILY
Status: DISCONTINUED | OUTPATIENT
Start: 2024-08-24 | End: 2024-09-02 | Stop reason: ALTCHOICE

## 2024-08-24 RX ADMIN — PANTOPRAZOLE SODIUM 40 MG: 40 INJECTION, POWDER, FOR SOLUTION INTRAVENOUS at 19:23

## 2024-08-24 RX ADMIN — LORAZEPAM 1 MG: 2 INJECTION INTRAMUSCULAR; INTRAVENOUS at 08:25

## 2024-08-24 RX ADMIN — SODIUM CHLORIDE, POTASSIUM CHLORIDE, SODIUM LACTATE AND CALCIUM CHLORIDE: 600; 310; 30; 20 INJECTION, SOLUTION INTRAVENOUS at 14:42

## 2024-08-24 RX ADMIN — SODIUM CHLORIDE, PRESERVATIVE FREE 10 ML: 5 INJECTION INTRAVENOUS at 19:23

## 2024-08-24 RX ADMIN — SODIUM CHLORIDE, PRESERVATIVE FREE 10 ML: 5 INJECTION INTRAVENOUS at 08:25

## 2024-08-24 NOTE — FLOWSHEET NOTE
08/24/24 0809   Treatment Team Notification   Reason for Communication Change in status;Evaluate;Review case   Name of Team Member Notified [facility]   Treatment Team Role Consulting Provider   Method of Communication Call   Response En route     Dr. Wilcox at bedside. Neurology PA called and updated on patient status by Dr. Wilcox. She reports she will be on her way.  See flowsheets and order for further details.

## 2024-08-24 NOTE — PROGRESS NOTES
[facility]  OCCUPATIONAL THERAPY MISSED TREATMENT NOTE  [facility] NEUROSCIENCES 4A  4A-14/014-A      Date: 2024  Patient Name: Mila Andrade        CSN: [id number]   : 1950  (74 y.o.)  Gender: female                REASON FOR MISSED TREATMENT: Hold Treatment per Nursing as patient NIH has increased, stat CT completed and patient required Ativan in order to complete. Patient currently slumbering and unable to engage in OT session, will await reading of CT and neuro recommendation to proceed. Will attempt next availability and as appropriate.     Giuliana Epstein, OTR/L [id number]

## 2024-08-24 NOTE — PROGRESS NOTES
Patient/family has been educated on their personal risk factors of: CVA and alcohol abuse.       They have been given hand outs on the following medications: Aspirin, Plavix, and Lipitor.      Treatment for stroke includes:  Risk factor modifications  Following the medication regime prescribed by physician      Educated on BEFAST-Balance-Eyesight-Face-Arm-Speech-Time    A stroke is a brain attack.Stroke is a brain injury. It occurs when the brain's blood supply is interrupted. Blood carries oxygen and nutrients to the brain. Without oxygen and nutrients from blood, brain tissue starts to die rapidly. This can happen in less than 10 minutes.    A stroke occurs when blood flow to the brain is blocked (called ischemic stroke). This is caused by one of the following:   Sudden decreased blood flow   Damage to a blood vessel supplying blood to the brain can occur suddenly from either:   Injury   A clot that forms and breaks off from another part of the body (such as the heart or neck)   There are certain conditions which predispose people to form blood clots, such as:   Cancer   Pregnancy   Atrial fibrillation   Certain autoimmune diseases   Local blood clot   A build-up of fatty substances ( atherosclerotic plaque ) along the inner lining of the artery causes:   Narrowing of artery   Reduced elasticity   Local inflammation   Blood protein defects leading to increased clotting tendency   Decreased blood flow in the artery   Clot in an artery supplying the brain   Inflammatory conditions in the blood vessels (vasculitis)   A stroke may also occur if a blood vessel breaks and bleeds into or around the brain. This is called hemorrhagic stroke.      This condition needs to be monitored closely. Be sure to keep all appointments. Have exams and blood tests done as directed.     Call 911 If Any of the Following Occurs   It is important that you and those around you know the warning signs for stroke. CALL 911 immediately if you  have any of the following which may suggest a new stroke:   Sudden weakness or numbness of face, arm, or leg, especially on one side of the body   Sudden confusion   Sudden trouble speaking or understanding   Sudden trouble seeing in one or both eyes   Sudden dizziness, trouble walking, loss of balance, or coordination   Sudden severe headache with no known cause   If you think you have an emergency, CALL 911       To help reduce your risk of stroke, take the following steps:   Eat a well-balanced diet. The DASH diet rich in fruits, vegetables and low-fat dairy foods, and low in saturated fat, total fat, and cholesterolmay help keep your blood pressure in the healthy range.   Exercise regularly.   Maintain a healthy weight. (Your body mass index should be below 25.)   If you smoke, quit .   Drink alcohol in moderation. Moderate is two or fewer drinks per day for men and one or fewer drinks per day for women and older adults.  Control your diabetes    All patient/family questions were answered and teach back method was utilized.

## 2024-08-24 NOTE — PLAN OF CARE
Problem: Discharge Planning  Goal: Discharge to home or other facility with appropriate resources  8/24/2024 1448 by Rajan Ford RN  Outcome: Progressing  Flowsheets (Taken 8/24/2024 1448)  Discharge to home or other facility with appropriate resources:   Identify barriers to discharge with patient and caregiver   Arrange for needed discharge resources and transportation as appropriate   Identify discharge learning needs (meds, wound care, etc)   Refer to discharge planning if patient needs post-hospital services based on physician order or complex needs related to functional status, cognitive ability or social support system     Problem: Chronic Conditions and Co-morbidities  Goal: Patient's chronic conditions and co-morbidity symptoms are monitored and maintained or improved  8/24/2024 1448 by Rajan Ford RN  Outcome: Progressing  Flowsheets (Taken 8/24/2024 1448)  Care Plan - Patient's Chronic Conditions and Co-Morbidity Symptoms are Monitored and Maintained or Improved:   Monitor and assess patient's chronic conditions and comorbid symptoms for stability, deterioration, or improvement   Collaborate with multidisciplinary team to address chronic and comorbid conditions and prevent exacerbation or deterioration     Problem: Safety - Adult  Goal: Free from fall injury  8/24/2024 1448 by Rajan Ford RN  Outcome: Progressing  Flowsheets (Taken 8/24/2024 1448)  Free From Fall Injury: Instruct family/caregiver on patient safety     Problem: Pain  Goal: Verbalizes/displays adequate comfort level or baseline comfort level  8/24/2024 1448 by Rajan Ford RN  Outcome: Progressing  Flowsheets (Taken 8/24/2024 1448)  Verbalizes/displays adequate comfort level or baseline comfort level:   Encourage patient to monitor pain and request assistance   Assess pain using appropriate pain scale   Administer analgesics based on type and severity of pain and evaluate

## 2024-08-24 NOTE — FLOWSHEET NOTE
08/24/24 0757   Treatment Team Notification   Reason for Communication Change in status;Review case;Evaluate  (asked for CTH .)   Name of Team Member Notified Dr. Wilcox   Treatment Team Role Attending Provider   Method of Communication Call   Response En route     Upon further completion of RN assessment, patients NIH has increased. Dr. Wilcox updated. This RN asked for CTH order. Dr. Wilcox states she will be on her way to assess patient.

## 2024-08-24 NOTE — PROGRESS NOTES
[facility]  PHYSICAL THERAPY MISSED TREATMENT NOTE  [facility] 4A    Date: 2024  Patient Name: Mila Andrade        MRN: [id number]   : 1950  (74 y.o.)  Gender: female                REASON FOR MISSED TREATMENT:  Hold treatment per nursing request.  RN reported patient NIH had increased, stat CT completed and patient required Ativan in order to complete. Patient currently sleeping soundly and unable to awaken or engage in PT session. CT result showed no new findings so will await neuro recommendation to proceed. Will attempt next availability and as appropriate.     Alyson Snyder, MPT [id number]

## 2024-08-24 NOTE — PROGRESS NOTES
Neurology Progress Note    Date:8/24/2024       Room:[facility]14/014-A  Patient Name:Mila Andrade     YOB: 1950     Age:74 y.o.  Chief Complaint   Patient presents with    Altered Mental Status        Requesting Physician: Nellie Wilcox MD     Reason for Consult:  Evaluate for stroke alert    Subjective       HPI: Mila Andrade who is a 74 y.o. female with a history of right frontal and anterior temporal lobe subarachnoid hemorrhage 2023, left PCA infarct 2020, alcohol abuse who presented to [facility] ED for the evaluation of mumbling speech and not following commands per . Patient went to bed last night at 9:00 PM at baseline and woke up this morning around 11:00 AM with these symptoms. Initial glucose 98. Initial blood pressure 160/59. Initial NIH found to be a 3 for missing one level of consciousness question, missing one command and having mild dysarthria. Stat CT head obtained and was negative for acute hemorrhage, mass effect, or midline shift. Given time of last known well patient deemed not a candidate for TNK. CTA head and neck revealed multiple areas of intracranial stenosis. Given low NIH patient deemed not a candidate for neurointervention. Stat MRI brain obtained and revealed bilateral infarcts. Discussed with  who states that patient was acting weird this morning after she got up. He states that she was up around walking and states she was without any weakness she just seemed disoriented with mumbled speech. He states she takes her Aspirin 81mg daily to his knowledge but does not take her Plavix.      Last known well:  9:00 PM 8/22.  Time of stroke alert:  12:12 PM.  Time of neurology arrival:  12:15 PM.  Vascular risk factors:  previous stroke, alcohol use.  Initial glucose: 98 .  Old deficits from prior stroke:  N/A.  INR in ED if anticoagulated:  not applicable.  Initial blood pressure:  160/59.  Initial NIHSS: 3.  Pre-morbid Modified Ector Scale:  2.   Time of initial

## 2024-08-24 NOTE — FLOWSHEET NOTE
08/24/24 0746   Treatment Team Notification   Reason for Communication Change in status;Review case;Evaluate  (restlessness, confusion, getting out of bed. paged during RN assessment.)   Name of Team Member Notified Dr. Wilcox   Treatment Team Role Attending Provider   Method of Communication Secure Message   Response No new orders       Paged Dr. Wilcox during RN assessment due to patient being very confused, not easily redirectable. Patient has already self removed 1 IV access. She is continuously taking off her telemetry monitor, SCDs, hospital gown, etc. This RN made her aware we are unable to give PO meds due to her failing her BSS, requested her to review medications for alternative routes of administration, and requested a bedside sitter.

## 2024-08-24 NOTE — PROGRESS NOTES
Hospitalist Progress Note      Patient:  Mila Andrade    Unit/Bed:4A-14/014-A  YOB: 1950  MRN: [id number]   Acct: [id number]   PCP: Saundra Nelson APRN - CNP  Date of Admission: 8/23/2024    Assessment/Plan:    Multiple infarcts involving the right frontal lobe right corona radiata right parietal lobe and punctate infarcts in both cerebellar hemisphere.  Cardiology has been consulted for echocardiogram.  Continue statin.    Encephalopathy  Suspect secondary to above  Sitter has been ordered  Repeat CT to rule out any bleed has been requested.    Acute anemia and shows iron deficiency  Hemoccult stools are pending  Noted reticulocyte count is elevated  Continue monitor hemoglobin hematocrit    Leukopenia  Vitamin B12 and folate level are normal  Severe iron deficiency noted    LDA: []CVC / []PICC / []Midline / []Huffman / []Drains / []Mediport / []None  Antibiotics: None  Steroids: None  Labs (still needed?): [x]Yes / []No  IVF (still needed?): [x]Yes / []No as patient is n.p.o.    Level of care: [x]Step Down / []Med-Surg  Bed Status: [x]Inpatient / []Observation  Telemetry: [x]Yes / []No  PT/OT: [x]Yes / []No    DVT Prophylaxis: [] Lovenox / [] Heparin / [x] SCDs / [] Already on Systemic Anticoagulation / [] None         Disposition: Home versus skilled versus rehab        Subjective (past 24 hours):   74-year-old who was brought in by  for altered mental status.  Patient was noted to have multiple infarcts involving the right hemisphere and also the cerebellum.  This morning I was called for patient who was having increasing confusion and change in her NIH score.  But when I got to see her she was attempting to get out of bed.  She was able to follow instructions at times but not consistently.  She was moving both lower extremities and her right upper extremity but not much of the left.  She appears not to be able to see.  She definitely

## 2024-08-24 NOTE — PROGRESS NOTES
[facility]  SPEECH THERAPY MISSED TREATMENT NOTE  [facility] 4A      Date: 2024  Patient Name: Mila Andrade        MRN: [id number]    : 1950  (74 y.o.)    REASON FOR MISSED TREATMENT:  Attempted to see patient this am to complete ST evaluation following stroke dx. RN approved attempt. Upon ST arrival, patient sleeping in bed with ST unable to wake. RN reports patient required Ativan to complete CT. ST to re-attempt as patient is medically appropriate and available.     Keena Chaudhari MS, CCC-SLP [id number]

## 2024-08-25 LAB
ANION GAP SERPL CALC-SCNC: 11 MEQ/L (ref 8–16)
BASOPHILS ABSOLUTE: 0 THOU/MM3 (ref 0–0.1)
BASOPHILS NFR BLD AUTO: 0.6 %
BUN SERPL-MCNC: 8 MG/DL (ref 7–22)
CALCIUM SERPL-MCNC: 8.4 MG/DL (ref 8.5–10.5)
CHLORIDE SERPL-SCNC: 107 MEQ/L (ref 98–111)
CO2 SERPL-SCNC: 23 MEQ/L (ref 23–33)
CREAT SERPL-MCNC: 0.7 MG/DL (ref 0.4–1.2)
DEPRECATED RDW RBC AUTO: 44.7 FL (ref 35–45)
EOSINOPHIL NFR BLD AUTO: 0.6 %
EOSINOPHILS ABSOLUTE: 0 THOU/MM3 (ref 0–0.4)
ERYTHROCYTE [DISTWIDTH] IN BLOOD BY AUTOMATED COUNT: 20 % (ref 11.5–14.5)
GFR SERPL CREATININE-BSD FRML MDRD: > 90 ML/MIN/1.73M2
GLUCOSE SERPL-MCNC: 88 MG/DL (ref 70–108)
HCT VFR BLD AUTO: 23.6 % (ref 37–47)
HCT VFR BLD AUTO: 25.6 % (ref 37–47)
HCT VFR BLD AUTO: 25.8 % (ref 37–47)
HGB BLD-MCNC: 6.5 GM/DL (ref 12–16)
HGB BLD-MCNC: 7.1 GM/DL (ref 12–16)
HGB BLD-MCNC: 7.3 GM/DL (ref 12–16)
HYPOCHROMIA BLD QL SMEAR: PRESENT
IMM GRANULOCYTES # BLD AUTO: 0.01 THOU/MM3 (ref 0–0.07)
IMM GRANULOCYTES NFR BLD AUTO: 0.3 %
LYMPHOCYTES ABSOLUTE: 0.8 THOU/MM3 (ref 1–4.8)
LYMPHOCYTES NFR BLD AUTO: 23.1 %
MCH RBC QN AUTO: 18.4 PG (ref 26–33)
MCHC RBC AUTO-ENTMCNC: 28.3 GM/DL (ref 32.2–35.5)
MCV RBC AUTO: 65 FL (ref 81–99)
MICROCYTES BLD QL SMEAR: PRESENT
MONOCYTES ABSOLUTE: 0.3 THOU/MM3 (ref 0.4–1.3)
MONOCYTES NFR BLD AUTO: 10 %
NEUTROPHILS ABSOLUTE: 2.2 THOU/MM3 (ref 1.8–7.7)
NEUTROPHILS NFR BLD AUTO: 65.4 %
NRBC BLD AUTO-RTO: 0 /100 WBC
PLATELET # BLD AUTO: 140 THOU/MM3 (ref 130–400)
PMV BLD AUTO: ABNORMAL FL (ref 9.4–12.4)
POTASSIUM SERPL-SCNC: 3.8 MEQ/L (ref 3.5–5.2)
RBC # BLD AUTO: 3.97 MILL/MM3 (ref 4.2–5.4)
SODIUM SERPL-SCNC: 141 MEQ/L (ref 135–145)
WBC # BLD AUTO: 3.3 THOU/MM3 (ref 4.8–10.8)

## 2024-08-25 PROCEDURE — 85025 COMPLETE CBC W/AUTO DIFF WBC: CPT

## 2024-08-25 PROCEDURE — 92610 EVALUATE SWALLOWING FUNCTION: CPT

## 2024-08-25 PROCEDURE — 97163 PT EVAL HIGH COMPLEX 45 MIN: CPT

## 2024-08-25 PROCEDURE — 97535 SELF CARE MNGMENT TRAINING: CPT

## 2024-08-25 PROCEDURE — 85018 HEMOGLOBIN: CPT

## 2024-08-25 PROCEDURE — 85014 HEMATOCRIT: CPT

## 2024-08-25 PROCEDURE — 6370000000 HC RX 637 (ALT 250 FOR IP): Performed by: INTERNAL MEDICINE

## 2024-08-25 PROCEDURE — 2580000003 HC RX 258: Performed by: STUDENT IN AN ORGANIZED HEALTH CARE EDUCATION/TRAINING PROGRAM

## 2024-08-25 PROCEDURE — 97110 THERAPEUTIC EXERCISES: CPT

## 2024-08-25 PROCEDURE — 99233 SBSQ HOSP IP/OBS HIGH 50: CPT | Performed by: INTERNAL MEDICINE

## 2024-08-25 PROCEDURE — 36415 COLL VENOUS BLD VENIPUNCTURE: CPT

## 2024-08-25 PROCEDURE — 51798 US URINE CAPACITY MEASURE: CPT

## 2024-08-25 PROCEDURE — 99232 SBSQ HOSP IP/OBS MODERATE 35: CPT

## 2024-08-25 PROCEDURE — 6360000002 HC RX W HCPCS: Performed by: INTERNAL MEDICINE

## 2024-08-25 PROCEDURE — 36430 TRANSFUSION BLD/BLD COMPNT: CPT

## 2024-08-25 PROCEDURE — 2060000000 HC ICU INTERMEDIATE R&B

## 2024-08-25 PROCEDURE — 2580000003 HC RX 258: Performed by: INTERNAL MEDICINE

## 2024-08-25 PROCEDURE — 97167 OT EVAL HIGH COMPLEX 60 MIN: CPT

## 2024-08-25 PROCEDURE — 80048 BASIC METABOLIC PNL TOTAL CA: CPT

## 2024-08-25 RX ORDER — SODIUM CHLORIDE 9 MG/ML
INJECTION, SOLUTION INTRAVENOUS PRN
Status: DISCONTINUED | OUTPATIENT
Start: 2024-08-25 | End: 2024-08-31

## 2024-08-25 RX ORDER — ASPIRIN 300 MG/1
300 SUPPOSITORY RECTAL DAILY
Status: DISCONTINUED | OUTPATIENT
Start: 2024-08-25 | End: 2024-08-28

## 2024-08-25 RX ORDER — SODIUM CHLORIDE, SODIUM LACTATE, POTASSIUM CHLORIDE, AND CALCIUM CHLORIDE .6; .31; .03; .02 G/100ML; G/100ML; G/100ML; G/100ML
500 INJECTION, SOLUTION INTRAVENOUS ONCE
Status: DISCONTINUED | OUTPATIENT
Start: 2024-08-25 | End: 2024-09-04

## 2024-08-25 RX ORDER — HYDRALAZINE HYDROCHLORIDE 20 MG/ML
10 INJECTION INTRAMUSCULAR; INTRAVENOUS EVERY 6 HOURS PRN
Status: DISCONTINUED | OUTPATIENT
Start: 2024-08-25 | End: 2024-09-05 | Stop reason: HOSPADM

## 2024-08-25 RX ADMIN — PANTOPRAZOLE SODIUM 40 MG: 40 INJECTION, POWDER, FOR SOLUTION INTRAVENOUS at 20:11

## 2024-08-25 RX ADMIN — SODIUM CHLORIDE, PRESERVATIVE FREE 10 ML: 5 INJECTION INTRAVENOUS at 20:11

## 2024-08-25 RX ADMIN — ASPIRIN 300 MG: 300 SUPPOSITORY RECTAL at 10:30

## 2024-08-25 RX ADMIN — PANTOPRAZOLE SODIUM 40 MG: 40 INJECTION, POWDER, FOR SOLUTION INTRAVENOUS at 10:31

## 2024-08-25 RX ADMIN — SODIUM CHLORIDE, POTASSIUM CHLORIDE, SODIUM LACTATE AND CALCIUM CHLORIDE: 600; 310; 30; 20 INJECTION, SOLUTION INTRAVENOUS at 11:11

## 2024-08-25 RX ADMIN — SODIUM CHLORIDE, SODIUM LACTATE, POTASSIUM CHLORIDE, AND CALCIUM CHLORIDE 500 ML: .6; .31; .03; .02 INJECTION, SOLUTION INTRAVENOUS at 14:45

## 2024-08-25 RX ADMIN — SODIUM CHLORIDE, PRESERVATIVE FREE 10 ML: 5 INJECTION INTRAVENOUS at 10:33

## 2024-08-25 NOTE — PROGRESS NOTES
[facility]  INPATIENT PHYSICAL THERAPY  EVALUATION  [facility] 4A - 4A-14/014-A    Discharge Recommendations: Discharge Recommendations: Continue to assess pending progress, IP Rehab, Therapy recommended at discharge  Equipment Recommendations: Other: monitor for needs       Time In: 1430  Time Out: 1453  Timed Code Treatment Minutes: 8 Minutes  Minutes: 23          Date: 2024  Patient Name: Mila Andrade,  Gender:  female        MRN: [id number]  : 1950  (74 y.o.)      Referring Practitioner: Geremias Thomas DO  Diagnosis: Confusion  Additional Pertinent Hx: Patient is a very pleasant 74-year-old female with medical history of prior stroke from home.  The patient has garbled speech is unable to contribute much to the history thus history is obtained from the patient's  is present at bedside.  He states that she was in her normal state of health approxi-9 PM return to bed last night.  He states this morning, she has been confused and in a \"daze\".  He states that she walked in the kitchen to make coffee, and then forgot everything that she was doing.  He states that she then went back to the kitchen made her coughing came back without the coffee.  As a attempt to talk to her, she has staring episodes and does not converse very much.     Upon my examination, the patient is mostly nonverbal.  She is able to answer few simple questions with garbled speech.  The  relates that the patient has been taking aspirin daily, but stopped taking all of her other medications including Lipitor approximate 6 months prior.     While in the ED, the patient was noted to have a hemoglobin of 7 down from approximate 11.6 approximately 1 year earlier.  Patient has never had a colonoscopy per the .  They have noticed no bleeding. - Per neurology note, \"Multiple infarcts involving the right frontal lobe right corona radiata right parietal lobe and punctate infarcts in both cerebellar  Family  Education Provided: Role of Therapy, Plan of Care, Home Exercise Program, Transfer Training  Education Method: Demonstration, Verbal  Barriers to Learning: None  Education Outcome: Verbalized understanding, Demonstrated understanding, Continued education needed       Plan:  Current Treatment Recommendations: Strengthening, Balance training, Functional mobility training, Endurance training, Safety education & training, Patient/Caregiver education & training, Gait training, Stair training, Neuromuscular re-education, Transfer training, Home exercise program, Equipment evaluation, education, & procurement, Therapeutic activities  General Plan:  (6x C)    Goals:  Patient Goals : go home  Short Term Goals  Time Frame for Short Term Goals: at discharge  Short Term Goal 1: Pt to be Mod I for supine <> sit to get in/out of bed  Short Term Goal 2: Pt to be Mod I for sit <> stand to get up to ambulate  Short Term Goal 3: Pt to ambulate > 15 ft with RW with Supervision for household distances  Short Term Goal 4: Pt to negotiate 4 steps with 1 rail with CGA for home access  Long Term Goals  Time Frame for Long Term Goals : not set due to short ELOS    Following session, patient left in safe position with all fall risk precautions in place.    Alyson Snyder, MPT [id number]

## 2024-08-25 NOTE — PROGRESS NOTES
[facility]  INPATIENT OCCUPATIONAL THERAPY  [facility] 4A  EVALUATION      Discharge Recommendations: Continue to assess pending progress, Patient would benefit from continued therapy after discharge  Equipment Recommendations: Equipment Needed: No  Other: continue to monitor, will likely require some AD dependent on abilities      Time In: 1159  Time Out: 1237  Timed Code Treatment Minutes: 28 Minutes  Minutes: 38          Date: 2024  Patient Name: Mila Andrade,   Gender: female      MRN: [id number]  : 1950  (74 y.o.)  Referring Practitioner: Geremias Thomas DO  Diagnosis: Confusion  Additional Pertinent Hx: Patient is a very pleasant 74-year-old female with medical history of prior stroke from home.  The patient has garbled speech is unable to contribute much to the history thus history is obtained from the patient's  is present at bedside.  He states that she was in her normal state of health approxi-9 PM return to bed last night.  He states this morning, she has been confused and in a \"daze\".  He states that she walked in the kitchen to make coffee, and then forgot everything that she was doing.  He states that she then went back to the kitchen made her coughing came back without the coffee.  As a attempt to talk to her, she has staring episodes and does not converse very much.     Upon my examination, the patient is mostly nonverbal.  She is able to answer few simple questions with garbled speech.  The  relates that the patient has been taking aspirin daily, but stopped taking all of her other medications including Lipitor approximate 6 months prior.     While in the ED, the patient was noted to have a hemoglobin of 7 down from approximate 11.6 approximately 1 year earlier.  Patient has never had a colonoscopy per the .  They have noticed no bleeding. - Per neurology note, \"Multiple infarcts involving the right frontal lobe right corona radiata right  hospitalization for confusion. Prior to admission patient independent in all I/ADL engagement and ambulated with no AD.. At this time patient requires increased assistance with all I/ADL and is unsafe to complete tasks independently. Patient would benefit from continued OT services to address the following deficits to return to OF.     Performance deficits / Impairments: Decreased functional mobility , Decreased ADL status, Decreased strength, Decreased safe awareness, Decreased cognition, Decreased endurance, Decreased balance, Decreased vision/visual deficit, Decreased coordination  Prognosis: Good  REQUIRES OT FOLLOW-UP: Yes  Decision Making: High Complexity    Treatment Initiated: Treatment and education initiated within context of evaluation.  Evaluation time included review of current medical information, gathering information related to past medical, social and functional history, completion of standardized testing, formal and informal observation of tasks, assessment of data and development of plan of care and goals.  Treatment time included skilled education and facilitation of tasks to increase safety and independence with ADL's for improved functional independence and quality of life.    Patient Education:          Patient Education  Education Given To: Patient;Family  Education Provided: Role of Therapy;Plan of Care;Transfer Training;Fall Prevention Strategies  Education Provided Comments: Education regarding stoke diagnosis and prevention, left neglect  Education Method: Demonstration;Verbal  Barriers to Learning: None  Education Outcome: Verbalized understanding;Demonstrated understanding;Continued education needed    Plan:  Times Per Week: 6x  Current Treatment Recommendations: Strengthening, Balance training, Functional mobility training, Endurance training, Neuromuscular re-education, Cognitive reorientation, Safety education & training, Patient/Caregiver education & training, Equipment

## 2024-08-25 NOTE — PLAN OF CARE
Problem: Discharge Planning  Goal: Discharge to home or other facility with appropriate resources  8/24/2024 1448 by Rajan Ford RN  Outcome: Progressing  Flowsheets (Taken 8/24/2024 1448)  Discharge to home or other facility with appropriate resources:   Identify barriers to discharge with patient and caregiver   Arrange for needed discharge resources and transportation as appropriate   Identify discharge learning needs (meds, wound care, etc)   Refer to discharge planning if patient needs post-hospital services based on physician order or complex needs related to functional status, cognitive ability or social support system     Problem: Chronic Conditions and Co-morbidities  Goal: Patient's chronic conditions and co-morbidity symptoms are monitored and maintained or improved  8/24/2024 1448 by Rajan Ford RN  Outcome: Progressing  Flowsheets (Taken 8/24/2024 1448)  Care Plan - Patient's Chronic Conditions and Co-Morbidity Symptoms are Monitored and Maintained or Improved:   Monitor and assess patient's chronic conditions and comorbid symptoms for stability, deterioration, or improvement   Collaborate with multidisciplinary team to address chronic and comorbid conditions and prevent exacerbation or deterioration     Problem: Safety - Adult  Goal: Free from fall injury  8/24/2024 1448 by Rajan Ford RN  Outcome: Progressing  Flowsheets (Taken 8/24/2024 1448)  Free From Fall Injury: Instruct family/caregiver on patient safety     Problem: Pain  Goal: Verbalizes/displays adequate comfort level or baseline comfort level  8/24/2024 1448 by Rajan Ford RN  Outcome: Progressing  Flowsheets (Taken 8/24/2024 1448)  Verbalizes/displays adequate comfort level or baseline comfort level:   Encourage patient to monitor pain and request assistance   Assess pain using appropriate pain scale   Administer analgesics based on type and severity of pain and evaluate  response   Implement non-pharmacological measures as appropriate and evaluate response   Consider cultural and social influences on pain and pain management   Notify Licensed Independent Practitioner if interventions unsuccessful or patient reports new pain     Problem: Skin/Tissue Integrity  Goal: Absence of new skin breakdown  Description: 1.  Monitor for areas of redness and/or skin breakdown  2.  Assess vascular access sites hourly  3.  Every 4-6 hours minimum:  Change oxygen saturation probe site  4.  Every 4-6 hours:  If on nasal continuous positive airway pressure, respiratory therapy assess nares and determine need for appliance change or resting period.  8/24/2024 1448 by Rajan Ford RN  Outcome: Progressing  Note: No signs of new skin breakdown. Skin warm, dry, and intact.  Mucous membranes pink and moist.  Assistance with turns/ambulation provided PRN.  Will continue to monitor.      Problem: Neurosensory - Adult  Goal: Achieves stable or improved neurological status  Outcome: Progressing  Flowsheets (Taken 8/24/2024 1448)  Achieves stable or improved neurological status:   Assess for and report changes in neurological status   Initiate measures to prevent increased intracranial pressure   Maintain blood pressure and fluid volume within ordered parameters to optimize cerebral perfusion and minimize risk of hemorrhage     Problem: Neurosensory - Adult  Goal: Achieves maximal functionality and self care  Outcome: Progressing  Flowsheets (Taken 8/24/2024 1448)  Achieves maximal functionality and self care:   Monitor swallowing and airway patency with patient fatigue and changes in neurological status   Encourage and assist patient to increase activity and self care with guidance from physical therapy/occupational therapy

## 2024-08-25 NOTE — PROGRESS NOTES
[facility]  SPEECH THERAPY  [facility] 4A  Clinical Swallow Evaluation    Discharge Recommendations: Inpatient Rehabilitation and Continue to Assess Pending Progress  DIET ORDER RECOMMENDATIONS AFTER EVALUATION: NPO until MBS completion    SLP Individual Minutes  Time In: 0848  Time Out: 0858  Minutes: 10  Timed Code Treatment Minutes: 0 Minutes       Date: 2024  Patient Name: Mila Andrade      CSN: [id number]   : 1950  (74 y.o.)  Gender: female   Referring Physician:  Geremias Thomas DO    Diagnosis: Anemia    History of Present Illness/Injury: Patient admitted with above medical diagnosis. See physician H&P for full history. Per medical chart review, \"Patient is a very pleasant 74-year-old female with medical history of prior stroke from home.  The patient has garbled speech is unable to contribute much to the history thus history is obtained from the patient's  is present at bedside.  He states that she was in her normal state of health approxi-9 PM return to bed last night.  He states this morning, she has been confused and in a \"daze\".  He states that she walked in the kitchen to make coffee, and then forgot everything that she was doing.  He states that she then went back to the kitchen made her coughing came back without the coffee.  As a attempt to talk to her, she has staring episodes and does not converse very much.     Upon my examination, the patient is mostly nonverbal.  She is able to answer few simple questions with garbled speech.  The  relates that the patient has been taking aspirin daily, but stopped taking all of her other medications including Lipitor approximate 6 months prior.     While in the ED, the patient was noted to have a hemoglobin of 7 down from approximate 11.6 approximately 1 year earlier.  Patient has never had a colonoscopy per the .  They have noticed no bleeding.\"    MRI BRAIN IMPRESSION 24:  1. Acute infarcts in

## 2024-08-25 NOTE — PROGRESS NOTES
Hospitalist Progress Note      Patient:  Mila Andrade    Unit/Bed:4A-14/014-A  YOB: 1950  MRN: [id number]   Acct: [id number]   PCP: Saundra Nelson APRN - CNP  Date of Admission: 8/23/2024    Assessment/Plan:    Multiple infarcts involving the right frontal lobe right corona radiata right parietal lobe and punctate infarcts in both cerebellar hemisphere.  Speech evaluating her swallow per speech patient will need a modified barium swallow tomorrow  Since she is still not alert enough we will hold off on feeding until tomorrow for further evaluation.  In the meantime will reach out to family when available to discuss artificial feeding  ASA can be switched to DE in the meantime  Continue statins      Encephalopathy  Suspect secondary to above  CT noted   Did not require sitter anymore  PT OT when more awake .    Acute anemia and shows iron deficiency  Hemoccult stools are pending  No active signs of external bleeding noted  Will cont ASA DE for now and monitor HB  Await occult stools  Patient will eventually need GI workup for Iron def  Hb holding around 7  Consult Hematology for Iron def anemia and leucopenia.        Leukopenia  Vitamin B12 and folate level are normal  Severe iron deficiency noted    LDA: []CVC / []PICC / []Midline / []Huffman / []Drains / []Mediport / []None  Antibiotics: None  Steroids: None  Labs (still needed?): [x]Yes / []No  IVF (still needed?): [x]Yes / []No as patient is n.p.o.    Level of care: [x]Step Down / []Med-Surg  Bed Status: [x]Inpatient / []Observation  Telemetry: [x]Yes / []No  PT/OT: [x]Yes / []No    DVT Prophylaxis: [] Lovenox / [] Heparin / [x] SCDs / [] Already on Systemic Anticoagulation / [] None         Disposition: Home versus skilled versus rehab        Subjective (past 24 hours):   Patient is quite sleepy.  Staff informs she has been sleepy since she received Ativan for her CT scan yesterday.  She did  carotid bruit   respiratory: Air exchange is noted bilaterally no rales rhonchi  Cardiovascular: S1-S2 heard with a regular rhythm   Abdomen: Soft, non-tender, non-distended with normal bowel sounds.  Musculoskeletal: Moves both lower and right not much of left upper.  Skin: Skin warm and dry.  Neurologic: Sleepy .did open eyes for few seconds.  Did squeeze her right hand.  Could not do much with her left hand.  Was able to move both her feet.    Labs:   Recent Labs     08/23/24  1256 08/23/24  1913 08/24/24  0430 08/24/24  1123 08/24/24  2032 08/25/24  0523 08/25/24  0812   WBC 2.9*  --  3.5*  --   --  3.3*  --    HGB 7.0*   < > 6.7*   < > 7.2* 7.3* 7.1*   HCT 26.2*   < > 25.0*   < > 26.8* 25.8* 25.6*     --  149  --   --  140  --     < > = values in this interval not displayed.     Recent Labs     08/23/24  1200 08/24/24  0430 08/25/24  0523    139 141   K 3.9 3.7 3.8    105 107   CO2 22* 23 23   BUN 7 7 8   CREATININE 0.7 0.6 0.7   CALCIUM 9.0 8.5 8.4*     Recent Labs     08/23/24  1200 08/24/24  0430   AST 12 8   ALT 6* <5*   BILITOT 0.4 0.4   ALKPHOS 64 58     Recent Labs     08/23/24  1200   INR 1.01     No results for input(s): \"CKTOTAL\", \"TROPONINI\" in the last 72 hours.    Microbiology:    Blood culture #1: No results found for: \"BC\"    Blood culture #2:No results found for: \"BLOODCULT2\"    Organism:No results found for: \"ORG\"    No results found for: \"LABGRAM\"    MRSA culture only:No results found for: \"MRSAC\"    Urine culture: No results found for: \"LABURIN\"    Respiratory culture: No results found for: \"CULTRESP\"    Aerobic and Anaerobic :  Lab Results   Component Value Date/Time    LABAERO No Acinetobacter species isolated. 07/22/2023 06:00 AM     No results found for: \"LABANAE\"    Urinalysis:      Lab Results   Component Value Date/Time    NITRU NEGATIVE 08/24/2024 08:00 AM    BLOODU NEGATIVE 08/24/2024 08:00 AM    GLUCOSEU NEGATIVE 08/24/2024 08:00 AM       Radiology:  CT HEAD WO

## 2024-08-25 NOTE — PROGRESS NOTES
Neurology Progress Note    Date:8/25/2024       Room:[facility]14/014-A  Patient Name:Mila Andrade     YOB: 1950     Age:74 y.o.  Chief Complaint   Patient presents with    Altered Mental Status        Requesting Physician: Nellie Wilcox MD     Reason for Consult:  Evaluate for stroke alert    Subjective       HPI: Mila Andrade who is a 74 y.o. female with a history of right frontal and anterior temporal lobe subarachnoid hemorrhage 2023, left PCA infarct 2020, alcohol abuse who presented to [facility] ED for the evaluation of mumbling speech and not following commands per . Patient went to bed last night at 9:00 PM at baseline and woke up this morning around 11:00 AM with these symptoms. Initial glucose 98. Initial blood pressure 160/59. Initial NIH found to be a 3 for missing one level of consciousness question, missing one command and having mild dysarthria. Stat CT head obtained and was negative for acute hemorrhage, mass effect, or midline shift. Given time of last known well patient deemed not a candidate for TNK. CTA head and neck revealed multiple areas of intracranial stenosis. Given low NIH patient deemed not a candidate for neurointervention. Stat MRI brain obtained and revealed bilateral infarcts. Discussed with  who states that patient was acting weird this morning after she got up. He states that she was up around walking and states she was without any weakness she just seemed disoriented with mumbled speech. He states she takes her Aspirin 81mg daily to his knowledge but does not take her Plavix.      Last known well:  9:00 PM 8/22.  Time of stroke alert:  12:12 PM.  Time of neurology arrival:  12:15 PM.  Vascular risk factors:  previous stroke, alcohol use.  Initial glucose: 98 .  Old deficits from prior stroke:  N/A.  INR in ED if anticoagulated:  not applicable.  Initial blood pressure:  160/59.  Initial NIHSS: 3.  Pre-morbid Modified Saunders Scale:  2.   Time of initial  begin Aspirin 81mg and Plavix 75mg.  Recommend doing so once safe from a hemoglobin standpoint.  Patient has required 3 blood transfusions thus far.  Could consider GI consult.  HgbA1C in the morning. If the patient has diabetes, recommend tight control of A1c with goal of <7.0 if attainable.   LDL 89. LDL goal of 45-70. Continue Lipitor 40mg.   Core stroke metrics  PT/OT/SLP consult. IPR consult if applicable.  NIHSS every shift. Neuro checks per unit unless otherwise specified.  Pre-morbid Modified Nye Scale: 2 - Slight disability:  unable to carry out all previous activities, but able to look after own affairs without assistance.  Patient to follow-up with outpatient general neurology and 4 to 6 weeks upon discharge for  post stroke evaluation.    Pulmonary:    SpO2 greater than 94%     Cardiovascular:  2D Echo with bubble study, ordered  Maintain telemetry, monitor for atrial-fib  EKG and cardiac enzymes x1 now.     Renal:  Start 0.9% Saline IV at 75 mL/hr.  Monitor and replete electrolytes as necessary.     GI:  Dysphagia screen prior to oral intake.  NPO for now pending swallow evaluation.  Hemoglobin low, patient has required 3 blood transfusions thus far.  Recommend continue treatment per primary team, could consider GI consult.     Id:  Monitor for infection  Urinalysis with Reflex  Chest XRay     Prophylaxis:  SCDs   Smoking and alcohol cessation when applicable. Provide stroke eduction for individualized risk factors.      Neurology following peripherally for any significant changes in mentation. Please reach out with any further questions or concerns.    This case was discussed with Dr. Ozuna and he is in agreement with the assessment and plan.    Electronically signed by JANUSZ Moser on 8/25/24 at 5:11 PM EDT

## 2024-08-26 ENCOUNTER — APPOINTMENT (OUTPATIENT)
Dept: GENERAL RADIOLOGY | Age: 74
End: 2024-08-26
Payer: MEDICARE

## 2024-08-26 PROBLEM — E44.0 MODERATE MALNUTRITION (HCC): Status: ACTIVE | Noted: 2024-08-26

## 2024-08-26 LAB
ALBUMIN SERPL BCG-MCNC: 3.6 G/DL (ref 3.5–5.1)
ALP SERPL-CCNC: 56 U/L (ref 38–126)
ALT SERPL W/O P-5'-P-CCNC: < 5 U/L (ref 11–66)
ANION GAP SERPL CALC-SCNC: 12 MEQ/L (ref 8–16)
AST SERPL-CCNC: 8 U/L (ref 5–40)
BILIRUB CONJ SERPL-MCNC: 0.2 MG/DL (ref 0.1–13.8)
BILIRUB SERPL-MCNC: 0.8 MG/DL (ref 0.3–1.2)
BUN SERPL-MCNC: 7 MG/DL (ref 7–22)
CALCIUM SERPL-MCNC: 8.2 MG/DL (ref 8.5–10.5)
CHLORIDE SERPL-SCNC: 102 MEQ/L (ref 98–111)
CO2 SERPL-SCNC: 21 MEQ/L (ref 23–33)
CREAT SERPL-MCNC: 0.6 MG/DL (ref 0.4–1.2)
GFR SERPL CREATININE-BSD FRML MDRD: > 90 ML/MIN/1.73M2
GLUCOSE SERPL-MCNC: 78 MG/DL (ref 70–108)
HAPTOGLOB SERPL-MCNC: 115 MG/DL (ref 30–200)
HCT VFR BLD AUTO: 29.3 % (ref 37–47)
HCT VFR BLD AUTO: 31.8 % (ref 37–47)
HCT VFR BLD AUTO: 33.2 % (ref 37–47)
HGB BLD-MCNC: 8.6 GM/DL (ref 12–16)
HGB BLD-MCNC: 8.7 GM/DL (ref 12–16)
HGB BLD-MCNC: 9.6 GM/DL (ref 12–16)
IRON SATN MFR SERPL: 8 % (ref 20–50)
IRON SERPL-MCNC: 26 UG/DL (ref 50–170)
LDH SERPL L TO P-CCNC: 142 U/L (ref 100–190)
MISC. #1 REFERENCE GROUP TEST: NORMAL
POTASSIUM SERPL-SCNC: 3.7 MEQ/L (ref 3.5–5.2)
PROT SERPL-MCNC: 5.8 G/DL (ref 6.1–8)
SODIUM SERPL-SCNC: 135 MEQ/L (ref 135–145)
TIBC SERPL-MCNC: 319 UG/DL (ref 171–450)

## 2024-08-26 PROCEDURE — 83615 LACTATE (LD) (LDH) ENZYME: CPT

## 2024-08-26 PROCEDURE — 6360000002 HC RX W HCPCS: Performed by: SPECIALIST

## 2024-08-26 PROCEDURE — 2060000000 HC ICU INTERMEDIATE R&B

## 2024-08-26 PROCEDURE — 92526 ORAL FUNCTION THERAPY: CPT

## 2024-08-26 PROCEDURE — 97530 THERAPEUTIC ACTIVITIES: CPT

## 2024-08-26 PROCEDURE — 2580000003 HC RX 258: Performed by: INTERNAL MEDICINE

## 2024-08-26 PROCEDURE — 6360000002 HC RX W HCPCS: Performed by: STUDENT IN AN ORGANIZED HEALTH CARE EDUCATION/TRAINING PROGRAM

## 2024-08-26 PROCEDURE — 6360000002 HC RX W HCPCS: Performed by: INTERNAL MEDICINE

## 2024-08-26 PROCEDURE — 36415 COLL VENOUS BLD VENIPUNCTURE: CPT

## 2024-08-26 PROCEDURE — 6370000000 HC RX 637 (ALT 250 FOR IP): Performed by: STUDENT IN AN ORGANIZED HEALTH CARE EDUCATION/TRAINING PROGRAM

## 2024-08-26 PROCEDURE — 80053 COMPREHEN METABOLIC PANEL: CPT

## 2024-08-26 PROCEDURE — 2580000003 HC RX 258: Performed by: SPECIALIST

## 2024-08-26 PROCEDURE — 83550 IRON BINDING TEST: CPT

## 2024-08-26 PROCEDURE — 85018 HEMOGLOBIN: CPT

## 2024-08-26 PROCEDURE — 6370000000 HC RX 637 (ALT 250 FOR IP): Performed by: INTERNAL MEDICINE

## 2024-08-26 PROCEDURE — 97110 THERAPEUTIC EXERCISES: CPT

## 2024-08-26 PROCEDURE — 97535 SELF CARE MNGMENT TRAINING: CPT

## 2024-08-26 PROCEDURE — 74018 RADEX ABDOMEN 1 VIEW: CPT

## 2024-08-26 PROCEDURE — 97116 GAIT TRAINING THERAPY: CPT

## 2024-08-26 PROCEDURE — 82248 BILIRUBIN DIRECT: CPT

## 2024-08-26 PROCEDURE — 97112 NEUROMUSCULAR REEDUCATION: CPT

## 2024-08-26 PROCEDURE — 83540 ASSAY OF IRON: CPT

## 2024-08-26 PROCEDURE — 6370000000 HC RX 637 (ALT 250 FOR IP): Performed by: PHYSICIAN ASSISTANT

## 2024-08-26 PROCEDURE — 99222 1ST HOSP IP/OBS MODERATE 55: CPT | Performed by: NURSE PRACTITIONER

## 2024-08-26 PROCEDURE — 2580000003 HC RX 258: Performed by: STUDENT IN AN ORGANIZED HEALTH CARE EDUCATION/TRAINING PROGRAM

## 2024-08-26 PROCEDURE — 85014 HEMATOCRIT: CPT

## 2024-08-26 RX ORDER — LANOLIN ALCOHOL/MO/W.PET/CERES
6 CREAM (GRAM) TOPICAL NIGHTLY PRN
Status: DISCONTINUED | OUTPATIENT
Start: 2024-08-26 | End: 2024-09-05 | Stop reason: HOSPADM

## 2024-08-26 RX ORDER — BISACODYL 10 MG
10 SUPPOSITORY, RECTAL RECTAL PRN
Status: DISCONTINUED | OUTPATIENT
Start: 2024-08-26 | End: 2024-09-05 | Stop reason: HOSPADM

## 2024-08-26 RX ORDER — CYANOCOBALAMIN 1000 UG/ML
1000 INJECTION, SOLUTION INTRAMUSCULAR; SUBCUTANEOUS DAILY
Status: COMPLETED | OUTPATIENT
Start: 2024-08-26 | End: 2024-08-30

## 2024-08-26 RX ORDER — AMLODIPINE BESYLATE 10 MG/1
10 TABLET ORAL DAILY
Status: DISCONTINUED | OUTPATIENT
Start: 2024-08-27 | End: 2024-09-05 | Stop reason: HOSPADM

## 2024-08-26 RX ORDER — THIAMINE HYDROCHLORIDE 100 MG/ML
100 INJECTION, SOLUTION INTRAMUSCULAR; INTRAVENOUS DAILY
Status: DISCONTINUED | OUTPATIENT
Start: 2024-08-26 | End: 2024-08-31

## 2024-08-26 RX ADMIN — SODIUM CHLORIDE, POTASSIUM CHLORIDE, SODIUM LACTATE AND CALCIUM CHLORIDE: 600; 310; 30; 20 INJECTION, SOLUTION INTRAVENOUS at 17:18

## 2024-08-26 RX ADMIN — SODIUM CHLORIDE 125 MG: 9 INJECTION, SOLUTION INTRAVENOUS at 14:53

## 2024-08-26 RX ADMIN — PANTOPRAZOLE SODIUM 40 MG: 40 INJECTION, POWDER, FOR SOLUTION INTRAVENOUS at 23:14

## 2024-08-26 RX ADMIN — HYDRALAZINE HYDROCHLORIDE 10 MG: 20 INJECTION INTRAMUSCULAR; INTRAVENOUS at 17:25

## 2024-08-26 RX ADMIN — BISACODYL SUPPOSITORY 10 MG: 10 SUPPOSITORY RECTAL at 18:26

## 2024-08-26 RX ADMIN — SODIUM CHLORIDE, PRESERVATIVE FREE 10 ML: 5 INJECTION INTRAVENOUS at 11:51

## 2024-08-26 RX ADMIN — THIAMINE HYDROCHLORIDE 100 MG: 100 INJECTION, SOLUTION INTRAMUSCULAR; INTRAVENOUS at 23:14

## 2024-08-26 RX ADMIN — ATORVASTATIN CALCIUM 40 MG: 40 TABLET, FILM COATED ORAL at 21:58

## 2024-08-26 RX ADMIN — PANTOPRAZOLE SODIUM 40 MG: 40 INJECTION, POWDER, FOR SOLUTION INTRAVENOUS at 11:50

## 2024-08-26 RX ADMIN — Medication 6 MG: at 22:02

## 2024-08-26 RX ADMIN — CYANOCOBALAMIN 1000 MCG: 1000 INJECTION, SOLUTION INTRAMUSCULAR; SUBCUTANEOUS at 18:34

## 2024-08-26 RX ADMIN — ASPIRIN 300 MG: 300 SUPPOSITORY RECTAL at 11:47

## 2024-08-26 NOTE — PROGRESS NOTES
Hematology: Ms. Andrade is a 74-year-old white female who was admitted with a new stroke.  The patient did have history of CVA in the past.  The patient was on aspirin and Plavix.  The patient was admitted with hemoglobin 7 g.  Hemoglobin dropped to 6.5 g.  The patient was transfused with 2 units of blood.  The patient has a low ferritin.  I will start the patient on IV iron.  The patient will need to be evaluated by GI service.  A full consult will be dictated.  Thank you for the consult.

## 2024-08-26 NOTE — PROGRESS NOTES
[facility]  STR NEUROSCIENCES 4A  Occupational Therapy  Daily Note    Discharge Recommendations: Not safe to return home at this time and Inpatient Therapy Stay  Equipment Recommendations: Equipment Needed: No  Other: continue to monitor, will likely require some AD dependent on abilities       Time In: 6  Time Out: 08  Timed Code Treatment Minutes: 38 Minutes  Minutes: 38          Date: 2024  Patient Name: Mila Andrade,   Gender: female      Room: [facility]14/014-A  MRN: [id number]  : 1950  (74 y.o.)  Referring Practitioner: Geremias Thomas DO  Diagnosis: Confusion  Additional Pertinent Hx: Patient is a very pleasant 74-year-old female with medical history of prior stroke from home.  The patient has garbled speech is unable to contribute much to the history thus history is obtained from the patient's  is present at bedside.  He states that she was in her normal state of health approxi-9 PM return to bed last night.  He states this morning, she has been confused and in a \"daze\".  He states that she walked in the kitchen to make coffee, and then forgot everything that she was doing.  He states that she then went back to the kitchen made her coughing came back without the coffee.  As a attempt to talk to her, she has staring episodes and does not converse very much.     Upon my examination, the patient is mostly nonverbal.  She is able to answer few simple questions with garbled speech.  The  relates that the patient has been taking aspirin daily, but stopped taking all of her other medications including Lipitor approximate 6 months prior.     While in the ED, the patient was noted to have a hemoglobin of 7 down from approximate 11.6 approximately 1 year earlier.  Patient has never had a colonoscopy per the .  They have noticed no bleeding. - Per neurology note, \"Multiple infarcts involving the right frontal lobe right corona radiata right parietal lobe and punctate  simple 1 step commands throughout session. Required sternal rub at times to awake, get her to respond. Eyes open ~25% of session.  **Pt is able to scan/visually track to midline, although makes no attempts to scan to L. TOtal assist to turn head towards L of midline with eyes lagging behind to right.     ADL:   Grooming: Moderate assistance with washing face, is able to wash L side of face with cuing. Requires additional assistance for thoroughness. Total assist with combing hair  Lower Extremity Dressing: Dependent.  Hassell soiled brief, donning clean brief. Assist X1 required for standing balance + assist of another to pull up depends.   Footwear Management: Dependent.  socks .  Toileting: Dependent; hygiene while standing; assist X1 for standing balance + assist X1 for hygiene    IADL:   Not Tested    BALANCE:  Sitting Balance:  Minimal Assistance. Fluctuating to max assistance. Waxes/wanes. Pt noted to have some pushing with RUE towards L ayo side. Max cues to maintain midline with poor carryover. Significant right sided gaze preference and inattention to L side.   Standing Balance: Moderate Assistance, X 2. Limited weight placed through LLE, leaning to L noted requiring max verbal/tactile cues to attempt correcting    BED MOBILITY:  Supine to Sit: Dependent, X 1, with head of bed raised, with verbal cues , with increased time for completion    Scooting: Maximum Assistance, with verbal cues , with increased time for completion ; advancing hips forward to EOB. Poor attention to LUE when advancing hips forward    TRANSFERS:  Sit to Stand:  Moderate Assistance, X 2, with increased time for completion, cues for hand placement, with verbal cues.    Stand to Sit: Moderate Assistance, X 2, with increased time for completion, cues for hand placement, with verbal cues.    Stand Pivot: Moderate Assistance, X 2, with increased time for completion, cues for hand placement, with verbal cues. Pivot completed towards pt's R

## 2024-08-26 NOTE — CONSULTS
Vital Sign     Unable to Pay for Housing in the Last Year: No     Number of Times Moved in the Last Year: 1     Homeless in the Last Year: No     Lives With: Spouse  Type of Home: House  Home Layout: Two level, Able to Live on Main level with bedroom/bathroom  Home Access: Stairs to enter with rails  Entrance Stairs - Number of Steps: 4  Entrance Stairs - Rails: Right  Home Equipment: None   Bathroom Shower/Tub: Tub/Shower unit  Bathroom Toilet: Handicap height  Bathroom Accessibility: Accessible     ADL Assistance: Independent  Homemaking Assistance: Independent (spouse cooks and does laundry)  Ambulation Assistance: Independent  Transfer Assistance: Independent  Active : No  Additional Comments: all info from spouse due to patient cognition. pt independent in all ADLs, would assist with dishes, used no AD    Family History:       Problem Relation Age of Onset    Other Mother         CHF    Other Father         smoker--COPD    Stroke Sister 58       Review of Systems:  Patient responded \"no\" to all review of systems questions, including weakness and visual issues. (Which were confirmed she does have on physical assessment)  CONSTITUTIONAL:  negative  EYES:  negative  HEENT:  negative  RESPIRATORY:  negative  CARDIOVASCULAR:  negative  GASTROINTESTINAL:  negative  GENITOURINARY:  negative  SKIN:  negative  HEMATOLOGIC/LYMPHATIC:  negative  MUSCULOSKELETAL:  negative  NEUROLOGICAL:  negative  BEHAVIOR/PSYCH:  negative  System review otherwise negative    Physical Exam:  BP (!) 159/60   Pulse 60   Temp (!) 96.7 °F (35.9 °C) (Axillary)   Resp 16   Ht 1.524 m (5')   Wt 62.2 kg (137 lb 2 oz)   SpO2 99%   BMI 26.78 kg/m²   awake  Orientation:   person  Mood: constricted  Affect: calm  General appearance: appearing older than stated age, flattened affect, disheveled    Memory:   abnormal    Attention/Concentration: abnormal -left visual deficit  Language:  abnormal -aphasia    Cranial Nerves: Difficulty  are tiny  old infarcts in both thalami.  There are no intra-or extra-axial collections.  There is no hydrocephalus,  midline shift or mass effect.  There is susceptibility artifact associated with the old infarct in the left  occipital lobe consistent with chronic mineralization in this location. M2  branch flow voids are abnormal on the left. This is consistent with known  chronic occlusion. Other flow voids are patent.  The midline craniocervical junction structures are normal.  The pituitary gland  and brainstem are normal.  IMPRESSION:  1. Acute infarcts in the posterior right frontal lobe and right corona radiata.  There are also acute infarcts in the deep white matter of the right parietal  lobe and punctate infarcts in both cerebellar hemispheres.  2. Old infarcts on the left in the corona radiata, left frontal lobe, left  parietal lobe, left temporal and left occipital lobes.  3. Mild severity chronic small vessel ischemic changes      CTA HEAD AND NECK W WO CONTRAST (CODE STROKE)  Result Date: 8/23/2024  FINDINGS: CTA NECK: Aortic arch and branches: There is mild atherosclerosis of the aortic arch. The origins of the innominate artery and left common carotid artery are normal. The subclavian artery origins are normal. Right common carotid artery/ICA: The right common carotid artery is normal. There is minimal calcified atherosclerosis of the right carotid bulb. There is no stenosis. The right internal carotid artery is normal. Left common carotid artery/ICA: The left common carotid artery is normal. There is worsening atherosclerosis of the left carotid bulb. Using NASCET criteria and the distal left internal carotid artery as the reference, there is a 30% stenosis at the origin of the left internal carotid artery. There is no significant stenosis. The left internal carotid artery is tortuous and redundant, but otherwise normal. Vertebral arteries: The vertebral arteries are normal bilaterally. The

## 2024-08-26 NOTE — CONSULTS
Comprehensive Nutrition Assessment    Type and Reason for Visit: Initial, Consult (Order and Manage Tube Feeds)    Nutrition Recommendations/Plan:   Initiate Jevity 1.2 at 10 ml/hr and increase by 10 ml q6hr to goal rate of 50 ml/hr.   Water flushes per MD.   Monitor for risk of refeeding syndrome.      Malnutrition Assessment:  Malnutrition Status: Moderate malnutrition  Context: Chronic Illness       Findings of the 6 clinical characteristics of malnutrition:  Energy Intake:  Mild decrease in energy intake (Comment) (has had poor PO intakes since 2017)  Weight Loss:  No significant weight loss     Body Fat Loss:  Mild body fat loss Orbital   Muscle Mass Loss:  Mild muscle mass loss Temples (temporalis), Clavicles (pectoralis & deltoids), Hand (interosseous)  Fluid Accumulation:  No significant fluid accumulation     Strength:  Not Performed    Nutrition Assessment:    Pt. moderately malnourished AEB criteria as listed above. At risk for further nutrition compromise r/t admitted d/t increased encephalopathy. Noted acute anemia- occult stools pendingPatient failed SLP evaluation on 8/26- recommending NGT. Noted underlying medical condition (PMHx ascending aortic aneurysm, stroke in 2017 per family).    Nutrition Related Findings:    Pt. Report/Treatments/Miscellaneous: Patient seen, sitting up in chair. Family members present in room. Patient unable to communicate. Per family members, she has been slowly losing weight since her stroke in 2017. She eats ~2 small meals per day. Family denies any recent nausea/ vomiting/ diarrhea/ constipation. RD reviewed plan of NGT and explained formula selection process. All questions answered.   GI Status: Last BM PTA  Wound: None   Edema: none, per flowsheet   Pertinent Labs:   Lab Results   Component Value Date    LABA1C see below 08/23/2024    LABA1C 5.0 02/19/2020     Recent Labs     08/24/24  0430 08/25/24  0523 08/26/24  0133    141 135   K 3.7 3.8 3.7    107  102   GLUCOSE 88 88 78   BUN 7 8 7   CREATININE 0.6 0.7 0.6     Pertinent Medications: iron, pantoprazole, statin     Current Nutrition Intake & Therapies:    Recent PO intake: NPO  Recent Supplement Intake: NPO   Diet NPO  Current Tube Feeding (TF) Orders:  Feeding Route: Nasogastric  Formula: Standard with Fiber  Schedule: Continuous  Feeding Regimen: Initiate Jevity 1.2 at 10 ml/hr and increase by 10 ml q6hr to goal rate of 50 ml/hr  Additives/Modulars: None  Water Flushes: per MD; when IVF are discontinued recommend 90ml q4hr  Goal TF & Flush Orders Provides: Jevity 1.2 at 50 ml/hr will provide 1440 kcal, 77 g pro, 259 g CHO, 25.2 g fiber, and 912 ml free water in 1200 ml volume in 24 hours    Anthropometric Measures:  Height: 152.4 cm (5')  Ideal Body Weight (IBW): 100 lbs  Admission Body Genie: 58.1 kg (128 lb) (8/23, no edema)  Current Body Weight: 62.1 kg (137 lb) (8/25, no edema- suspect this weight is incorrect)  Current BMI: Body mass index is 26.78 kg/m².  Usual Body Weight:  (7/22/23: 136 lb. Per  her UBW is around 140 lb)  BMI Categories: Overweight (25-29.9)    Estimated Daily Nutrient Needs:  Energy (kcal/day): 9976-2554 kcal (20-25 kcal/kg)  Weight Used for energy calculation:  62 kg  Protein (g/day): 74-93 g (1.2-1.5 g/kg)    Weight Used for protein calculation: 62 kg    Fluid (ml/day): per MD     Nutrition Diagnosis:   Moderate malnutrition, In context of chronic, non-illness related related to inadequate protein-energy intake as evidenced by Criteria as identified in malnutrition assessment    Nutrition Interventions:   Nutrition and/ or Nutrient Delivery: Start Tube Feeding   Nutrition Education/ Counseling: Education initiated   Coordination of Nutrition Care: Continue to monitor while inpatient     Goals:  Goals: Initiate nutrition support, by next RD assessment     Nutrition Monitoring and Evaluation:   Food/ Nutrient Intake Outcomes: Enteral Nutrition Intake/Tolerance   Physical

## 2024-08-26 NOTE — PROGRESS NOTES
[facility]  INPATIENT PHYSICAL THERAPY  DAILY NOTE  [facility] NEUROSCIENCES 4A - 4A-14/014-A      Discharge Recommendations: Inpatient Therapy Stay  Equipment Recommendations:Other: monitor for needs      Time In: 1250  Time Out: 1315  Timed Code Treatment Minutes: 25 Minutes  Minutes: 25          Date: 2024  Patient Name: Mila Andrade,  Gender:  female        MRN: [id number]  : 1950  (74 y.o.)     Referring Practitioner: Geremias Thomas DO  Diagnosis: Confusion  Additional Pertinent Hx: Patient is a very pleasant 74-year-old female with medical history of prior stroke from home.  The patient has garbled speech is unable to contribute much to the history thus history is obtained from the patient's  is present at bedside.  He states that she was in her normal state of health approxi-9 PM return to bed last night.  He states this morning, she has been confused and in a \"daze\".  He states that she walked in the kitchen to make coffee, and then forgot everything that she was doing.  He states that she then went back to the kitchen made her coughing came back without the coffee.  As a attempt to talk to her, she has staring episodes and does not converse very much.     Upon my examination, the patient is mostly nonverbal.  She is able to answer few simple questions with garbled speech.  The  relates that the patient has been taking aspirin daily, but stopped taking all of her other medications including Lipitor approximate 6 months prior.     While in the ED, the patient was noted to have a hemoglobin of 7 down from approximate 11.6 approximately 1 year earlier.  Patient has never had a colonoscopy per the .  They have noticed no bleeding. - Per neurology note, \"Multiple infarcts involving the right frontal lobe right corona radiata right parietal lobe and punctate infarcts in both cerebellar hemisphere.\"     Prior Level of Function:  Lives With: Spouse  Type of Home:

## 2024-08-26 NOTE — PROGRESS NOTES
[facility]  INPATIENT REHABILITATION  ADMISSIONS COORDINATOR CONSULT    Referral Type: internal    Patient Name: Mila Andrade      MRN: [id number]    : 1950  (74 y.o.)  Gender: female   Race:White (non-)     Payor Source: Payor: [facility] MEDICARE / Plan: ANTHEM MEDIBLUE ESSENTIAL/PLUS / Product Type: *No Product type* /   Secondary Payor Source:      Isolation Status: No active isolations    Lives With: Spouse  Type of Home: House  Home Layout: Two level, Able to Live on Main level with bedroom/bathroom  Home Access: Stairs to enter with rails  Entrance Stairs - Number of Steps: 4        Additional Comments: all info from spouse due to patient cognition. pt independent in all ADLs, would assist with dishes, used no AD    What is treatment plan?  Disciplines Required upon Admission to Inpatient Rehabilitation: Physical Therapy, Occupational Therapy, and Speech Therapy  Post operative: No  Fall: No  Dialysis: No  Diet: Diet NPO  ADULT TUBE FEEDING; Nasogastric; Standard with Fiber; Continuous; 10; Yes; 10; Q 6 hours; 50; 30; Q 4 hours  Discussed patient with  and PM&R provider: Await medical work up completion, will need permanent feeding source, await PMR consult for recommendations

## 2024-08-26 NOTE — CARE COORDINATION
Case Management Assessment Initial Evaluation    Date/Time of Evaluation: 2024 7:42 AM  Assessment Completed by: Little Mendez RN    If patient is discharged prior to next notation, then this note serves as note for discharge by case management.    Patient Name: Mila Andrade                   YOB: 1950  Diagnosis: Confusion [R41.0]  Anemia [D64.9]  Altered mental status, unspecified altered mental status type [R41.82]  Iron deficiency anemia, unspecified iron deficiency anemia type [D50.9]  Cerebrovascular accident (CVA) due to bilateral stenosis of middle cerebral arteries (HCC) [I63.513]                   Date / Time: 2024 12:05 PM  Location: [address] [place] [address]     Patient Admission Status: Inpatient   Readmission Risk Low 0-14, Mod 15-19), High > 20: Readmission Risk Score: 14.7    Current PCP: Saundra Nelson APRN - CNP  Health Care Decision Makers:   Primary Decision Maker (Active): Anil Andrade - Spouse - [phone]    Additional Case Management Notes: From ED, Hgb on  6.5, transfused PRBC's , post Hgb is 8.7. Neurology consult, PT/OT/SLP, Hem/Onc consult for anemia Physiatry Asa, Plavix, IV fluids, Zofran prn, IV Protonix.    Procedures:    EEG   ECHO    Imagin/23 CT Head WO Contrast 1. Mild atrophy and encephalomalacia especially in the left occipital and left   posterior temporal lobes.   2. Otherwise negative noncontrast CT scan of the brain.   3. Results called to the stroke team at 12:35 p.m.      CTA Head Neck W WO Contrast 1. New absent M2 branch of the right middle cerebral artery.   2. Stable mild stenosis of the right M1 segment.   3. Stable occluded left middle cerebral artery.   4. Stable occluded left P1 segment.   5. Mild worsening of the atherosclerosis of the left carotid bulb. This does not   contribute to a hemodynamically significant stenosis.      MRI Brain WO Contrast   1. Acute infarcts in the posterior right frontal lobe  and right corona radiata.   There are also acute infarcts in the deep white matter of the right parietal   lobe and punctate infarcts in both cerebellar hemispheres.   2. Old infarcts on the left in the corona radiata, left frontal lobe, left   parietal lobe, left temporal and left occipital lobes.   3. Mild severity chronic small vessel ischemic changes.     8/24 CT Head WO Contrast 1. No hemorrhage.   2. Subtle low-attenuation in the subcortical white matter of the posterior right   frontal lobe at the area of recent infarction.   3. Stable appearing old infarcts in the left cerebral hemisphere.         Patient Goals/Plan/Treatment Preferences: Met with Mlia's  Anil. Anil shares that prior to admission Mila was independent at home. Discharge plan is unsure pending clinical course. Will follow for potential needs/services. SW consult in place. Refer to KEITH notes.        08/26/24 1142   Service Assessment   Patient Orientation Unresponsive   Cognition Severely Impaired   History Provided By Significant Other   Primary Caregiver Self   Support Systems Spouse/Significant Other   Patient's Healthcare Decision Maker is: Legal Next of Kin   PCP Verified by CM Yes   Last Visit to PCP Within last year   Prior Functional Level Independent in ADLs/IADLs   Current Functional Level Assistance with the following:;Bathing;Dressing;Toileting;Feeding;Mobility   Can patient return to prior living arrangement Unknown at present   Ability to make needs known: Unable   Family able to assist with home care needs: No   Would you like for me to discuss the discharge plan with any other family members/significant others, and if so, who? Yes  (spouse)   Financial Resources Medicare   Community Resources None   CM/SW Referral Other (see comment)  (possibl need for SNF)   Social/Functional History   Home Equipment None   Active  No   Patient's  Info spouse   Discharge Planning   Type of Residence House   Living

## 2024-08-26 NOTE — PROGRESS NOTES
Patient was found with NGT only at 20cm, patient almost to edge of bed. Restraints still on. NGT removed with tip intact. Patient repositioned in bed. Restraints discontinued. Dr. Jeffers was notified. Oncoming nurse to reinsert.

## 2024-08-26 NOTE — PLAN OF CARE
Problem: Discharge Planning  Goal: Discharge to home or other facility with appropriate resources  8/25/2024 2138 by Elva Schmid RN  Outcome: Progressing  Discharge to home or other facility with appropriate resources:   Identify barriers to discharge with patient and caregiver   Arrange for needed discharge resources and transportation as appropriate   Identify discharge learning needs (meds, wound care, etc)   Refer to discharge planning if patient needs post-hospital services based on physician order or complex needs related to functional status, cognitive ability or social support system     Problem: Safety - Adult  Goal: Free from fall injury  8/25/2024 2138 by Elva Schmid RN  Outcome: Progressing  Free From Fall Injury: Instruct family/caregiver on patient safety     Problem: Pain  Goal: Verbalizes/displays adequate comfort level or baseline comfort level  8/25/2024 2138 by Elva Schmid RN  Outcome: Progressing  Verbalizes/displays adequate comfort level or baseline comfort level:   Encourage patient to monitor pain and request assistance   Assess pain using appropriate pain scale   Administer analgesics based on type and severity of pain and evaluate response   Implement non-pharmacological measures as appropriate and evaluate response   Consider cultural and social influences on pain and pain management   Notify Licensed Independent Practitioner if interventions unsuccessful or patient reports new pain     Problem: Skin/Tissue Integrity  Goal: Absence of new skin breakdown  Description: 1.  Monitor for areas of redness and/or skin breakdown  2.  Assess vascular access sites hourly  3.  Every 4-6 hours minimum:  Change oxygen saturation probe site  4.  Every 4-6 hours:  If on nasal continuous positive airway pressure, respiratory therapy assess nares and determine need for appliance change or resting period.  8/25/2024 2138 by Elva Schmid RN  Outcome: Progressing  Note: No signs of skin

## 2024-08-26 NOTE — CARE COORDINATION
DISCHARGE PLANNING EVALUATION  8/26/24, 11:31 AM EDT    Reason for Referral: Discharge plan.   Decision Maker: Spouse is helping with discharge planning.    Current Services: None  New Services Requested: IPR vs ECF  Family/ Social/ Home environment: From home with her spouse.  She was independent at home prior to admission.  Her spouse does all of the driving.  He reports that she has a daughter Kesha, but they do not speak.  She is the second contact.  He asked that SW take her off of the contact list and replace her with patients sister Chasidy.  He is unsure how to spell Chasidy's last name, but her number is [phone]. He told SW that she has a POA that he will bring in.  SW will not change contact information until he brings the POA in or the patient is able to tell SW to change.   Payment Source:Medicare BCBS  Transportation at Discharge: May need Ambulance if she is not more alert and moving better.   Post-acute (PAC) provider list was provided to patient. Patient was informed of their freedom to choose PAC provider. Discussed and offered to show the patient the relevant PAC Providers quality and resource use measures on Medicare Compare web site via computer based on patient's goals of care and treatment preferences. Questions regarding selection process were answered.      Teach Back Method used with Mila in the room, but not participating and her spouse Anil regarding care plan and discharge planning.   Patient's spouse verbalized understanding of the plan of care and contribute to goal setting.       Patient preferences and discharge plan: PT recommended IPR.  Will have a consult placed.  Talked to spouse about potentially needing an ECF at discharge.  Gave him a list.  Will continue to follow for needs.      Electronically signed by SHANTE Pretty on 8/26/2024 at 11:31 AM

## 2024-08-26 NOTE — CONSULTS
Component Value Date/Time    TSH 5.100 08/23/2024 04:16 PM     UA:   Lab Results   Component Value Date/Time    COLORU YELLOW 08/24/2024 08:00 AM    PHUR 5.5 08/24/2024 08:00 AM    PHUR 5.5 07/22/2023 03:20 AM    LEUKOCYTESUR NEGATIVE 08/24/2024 08:00 AM    UROBILINOGEN 0.2 08/24/2024 08:00 AM    BILIRUBINUR NEGATIVE 08/24/2024 08:00 AM    BLOODU NEGATIVE 08/24/2024 08:00 AM    GLUCOSEU NEGATIVE 08/24/2024 08:00 AM         Physical Exam:  Vitals:    08/26/24 1212   BP: (!) 159/60   Pulse: 60   Resp: 16   Temp: (!) 96.7 °F (35.9 °C)   SpO2: 99%      Intake/Output Summary (Last 24 hours) at 8/26/2024 1448  Last data filed at 8/26/2024 0316  Gross per 24 hour   Intake 2262.27 ml   Output --   Net 2262.27 ml      General:  mute. Non-communicating  Neck: Supple, no JVD, no carotid bruits  Heart: Regular rhythm normal S1 and S2, no rubs, murmurs or gallops  Lungs: clear to ascultation no rales, wheezes, or rhonchi  Abdomen: positive bowel sounds, soft, non-tender, non-distended, no bruits, no masses  Extremities:no clubbing, cyanosis or edema  Neurologic: alert and oriented x 0, moving all extremities non-purposeful  Skin: No rashes  Psych: AO x 0, no depression/casie, no pressured speech, normal affect  Lymph: No obvious LAD      Assessment and Plan  Need JODI  Had TTE done in 8/23/2024, with EF of 55-60%, mod AR, mild AS, mild MA and mild TR. Dilated ascending aorta (4.3 cm)  Pt has prior CVA. No residual weakness. New baseline change from speech and responsiveness.  MRI brain without contrast from 8/23 indicate acute infarcts in the posterior frontal lobe and right corona radiata and deep white matter of the right parietal.   Will do JODI 8/27 in am to rule out cardiac abnormalities.  Keep n.p.o. past midnight  Prior CVA.  MRI brain without contrast from 8/23/24 showed acute infarct in the posterior right frontal lobe and right corona radiata.  Acute infarct in the deep white matter of the right parietal lobe and on

## 2024-08-26 NOTE — PROGRESS NOTES
Hospitalist Progress Note  Internal Medicine Resident      Patient: Mila Andrade 74 y.o. female      Unit/Bed: -14/014-A    Admit Date: 8/23/2024      ASSESSMENT AND PLAN  Active Problems  Multiple infarcts involving the right frontal lobe right corona radiata right parietal lobe and punctate infarcts in both cerebellar hemisphere: Hx of previous strokes ist stroke was 2014 stroke in L frontal lobe. It appears she went to see her family physician following stroke but didn't do any other follow-up. Was noncompliant on meds. Had a L temporal lobe stroke in 2020. Per  has not taken any meds for the past 6 months. Multiple infarcts seen on MRI. Speech saw recommend NPO until MBS. Not a candidate for MBS currently.  agrees to NG tube placement.   -Place NG tube.  -Consulted dietician for tube feeds.   -Will start all meds after NG placed.     Encephalopathy: likely 2/2 above recent stroke. Nursing and family report waxing and waning mentation. There may be a component of hospital acquired delirium.   -PT OT when more awake .  -Delirium precautions.     Acute anemia and shows iron deficiency: No evidence of bleed at this time. LDH normal. Retic count is low. Likely a bone marrow problem. With low iron may be a resource problem. Pt had Hgb 6.7 on admit. Received 1 unit. Was stable around 7.2. Dropped to 6.5 got another unit. Now 8.7.   -Hemoccult stools are pending  -eventual GI workup for Iron def  -Hematology consulted.   -On protonix 40 bid.     Leukopenia: Vitamin B12 and folate level are normal. Severe iron deficiency noted  -Hematology has been consulted.   -Started on Ferrlecit 125 qd x 3.   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   -will rescan to see if it has progressed.       Resolved Problems    Chronic Conditions (reviewed and stable unless otherwise stated)        LDA: []CVC / []PICC / []Midline / []Huffman / []Drains / []Mediport / [x]None  Antibiotics: No  Steroids: No  Labs (still  sounds.  Musculoskeletal: No joint swelling or tenderness. Normal tone. No abnormal movements.   Skin: Warm and dry. No rashes or lesions.  Neurologic:  Unclear neuro deficits at this time. Withdraws from pain. Is alert moves all extremities. Does not participate in exam.   Psychiatric: Alert unclear orientation. Per nursing was participating in conversation earlier.    Capillary Refill: Brisk,< 3 seconds.  Peripheral Pulses: +2 palpable, equal bilaterally.       Labs/Radiology: See chart or assessment above.     Electronically signed by Manish Jeffers DO on 8/26/2024 at 8:33 AM  Case was discussed with Attending, Dr. Dickson. .

## 2024-08-26 NOTE — PROGRESS NOTES
[facility]  INPATIENT SPEECH THERAPY  [facility] NEUROSCIENCES 4A  DAILY NOTE    TIME   SLP Individual Minutes  Time In: 0848  Time Out: 0904  Minutes: 16  Timed Code Treatment Minutes: 0 Minutes       Date: 2024  Patient Name: Mila Andrade      CSN: [id number]   : 1950  (74 y.o.)  Gender: female   Referring Physician:   Geremias Thomas DO    Diagnosis: Confusion   Precautions: Fall risk   Current Diet: NPO   Respiratory Status: Room Air  Swallowing Strategies: Not Applicable  Date of Last MBS/FEES: Not Applicable    Pain:  No pain reported.    Subjective:  Spoke with JASON Tolentino for approval of ST interventions. Upon arrival, patient sitting upright in recliner. Significant lethargy presenting requiring continuous cues to maintain alertness. Patient's  at bedside.     Short-Term Goals:  SHORT TERM GOAL #1:  Goal 1: Patient will consume conservative oral offerings (ice chips, thin liquid, puree), demonstrating consistency of pharyngeal trigger and adequate alertness/endurance measures to determine readiness for an instrumental evaluation of swallow function.  INTERVENTIONS:Patient consumed ice chip PO trials x3. Patient with intermittent mastication of ice chip. Delayed, yet consistent pharyngeal trigger achieved. Immediate cough to follow 1/3 ice chip trials. Concerning for airway invasion events given significance of neurological changes and lethargy presenting. Certainly cannot determine pharyngeal dysfunction at bedside alone, however patient is not yet appropriate for instrumental evaluation at this time.     Extensive education provided to patient's  about recommendations for consideration of NGT placement to aid in short-term alternative means of nutrition.  expressed understanding and agreeable to NGT.     Recommend STRICT NPO status and considerations for NGT placement as rehabilitative progress to likely be prolonged. Skilled ST services to be prioritized for

## 2024-08-26 NOTE — CONSULTS
[place] [id number]                              CONSULTATION      PATIENT NAME: LINDA ALEXIS                : 1950  MED REC NO: [id number]                       ROOM: [facility]  ACCOUNT NO: [id number]                       ADMIT DATE: 2024  PROVIDER: Gladys Holloway MD    HEMATOLOGY CONSULT    CONSULT DATE: 2024      HISTORY OF PRESENT ILLNESS:  The patient is a 74-year-old white female, who was admitted to the hospital with acute CVA.  The patient does have history in the past of CVA.  The patient was noted to have a hemoglobin of 11 g on admission, went down to 7 g.  The patient was on Plavix and aspirin.  The patient did have anemia workup as the patient was found to have severe iron deficiency.  The patient is not communicating with me.  I am getting the information from the records and the  and the sister.  There is no history of GI bleeding.    PAST MEDICAL HISTORY:  Positive for,  1. Ascending aortic aneurysm.  2. Stroke.  3. Cerebral infarction.    PAST SURGICAL HISTORY:  Cataract surgery.    ALLERGIES:  SHE HAS NO KNOWN ALLERGIES.      SOCIAL HISTORY:  She lives with her .  She does not smoke.  She does drink alcohol.    FAMILY HISTORY:  She had a sister  from a stroke.    REVIEW OF SYSTEMS:  Unobtainable.  The patient is aphasic.    PHYSICAL EXAMINATION:  GENERAL:  She is a pleasant female, sitting on the recliner, in no respiratory distress.  Weight 137 pounds.   VITALS:  Temp is 96.7, respirations 16, pulse 80, blood pressure 159/60, oxygen saturation 99% on room air.   HEENT:  Normocephalic, atraumatic.    NECK:  Supple.  No JVD.  No bruit.  No thyromegaly or lymphadenopathy.    CHEST:  Bilateral fair air entry with no rales or rhonchi.   HEART:  S1, S2.   ABDOMEN:  Soft.    SKIN:  No petechiae, no ecchymosis.    EXTREMITIES:  No clubbing, no cyanosis, and no edema.    LABORATORY DATA:   CBC:  White blood cells 3.3, hemoglobin 7.3 g, hematocrit 25.8, and platelets 140.  Blood chemistry:  Creatinine 0.6.  Glucose 78.  Liver profile is normal.  Vitamin B12 is 215.  Ferritin 5.    ASSESSMENT AND PLAN:  This is a 74-year-old female with severe iron deficiency anemia.  The patient was started on IV iron.  The patient needs to be seen by GI Service.  The patient does have borderline vitamin B12.  We will start the patient on B12 shots.      Thank you for the consult.     I will be following the patient.          SYLVIA CONNORS MD      D:  08/26/2024 13:51:53     T:  08/26/2024 17:44:02     NIEVES/KELLY  Job #:  [id number]     Doc#:  [id number]

## 2024-08-26 NOTE — PROGRESS NOTES
[facility]   PROGRESS NOTE      Patient: Mila Andrade  Room #: 4A-14/014-A            YOB: 1950  Age: 74 y.o.  Gender: female            Admit Date & Time: 8/23/2024 12:05 PM    Assessment:    The patient's spouse welcomed prayer.    Interventions:  The patient in her bed with her spouse of 40 years present. The spouse shared the patient had multiple strokes and had difficulty understanding but could given enough time. The spouse shared how she was improving but the cause of the strokes was being investigated. He also shared the patient is passionate about cats. The encounter ended in prayer.     Outcomes:  The patient was thankful for the provided prayer.     Plan:  1.Spiritual care will continue to follow the patient according to [facility] spiritual care SOP.       Electronically signed by Chaplain Evaristo, on 8/26/2024 at 3:02 PM.  Spiritual Care Department  [facility]  [phone]     08/26/24 1500   Encounter Summary   Encounter Overview/Reason Initial Encounter   Service Provided For Patient;Significant other;Patient and family together   [facility]   Support System Spouse;Family members;Sabianist/brigid community   Last Encounter  08/26/24   Complexity of Encounter Moderate   Begin Time 1410   End Time  1420   Total Time Calculated 10 min   Spiritual/Emotional needs   Type Spiritual Support   Assessment/Intervention/Outcome   Assessment Calm;Coping;Impaired resilience   Intervention Active listening;Discussed illness injury and it’s impact;Empowerment;Prayer (assurance of)/[place];Nurtured Hope   Outcome Comfort;Encouraged;Expressed Gratitude

## 2024-08-27 ENCOUNTER — APPOINTMENT (OUTPATIENT)
Dept: CT IMAGING | Age: 74
End: 2024-08-27
Payer: MEDICARE

## 2024-08-27 LAB
ALBUMIN SERPL BCG-MCNC: 3.4 G/DL (ref 3.5–5.1)
ALP SERPL-CCNC: 56 U/L (ref 38–126)
ALT SERPL W/O P-5'-P-CCNC: < 5 U/L (ref 11–66)
ANION GAP SERPL CALC-SCNC: 12 MEQ/L (ref 8–16)
AST SERPL-CCNC: 10 U/L (ref 5–40)
BILIRUB SERPL-MCNC: 0.6 MG/DL (ref 0.3–1.2)
BUN SERPL-MCNC: 6 MG/DL (ref 7–22)
CALCIUM SERPL-MCNC: 8.5 MG/DL (ref 8.5–10.5)
CHLORIDE SERPL-SCNC: 104 MEQ/L (ref 98–111)
CO2 SERPL-SCNC: 22 MEQ/L (ref 23–33)
CREAT SERPL-MCNC: 0.5 MG/DL (ref 0.4–1.2)
DEPRECATED RDW RBC AUTO: 55.8 FL (ref 35–45)
ERYTHROCYTE [DISTWIDTH] IN BLOOD BY AUTOMATED COUNT: 23.2 % (ref 11.5–14.5)
GFR SERPL CREATININE-BSD FRML MDRD: > 90 ML/MIN/1.73M2
GLUCOSE SERPL-MCNC: 98 MG/DL (ref 70–108)
HCT VFR BLD AUTO: 32.2 % (ref 37–47)
HGB BLD-MCNC: 9.1 GM/DL (ref 12–16)
MCH RBC QN AUTO: 19.7 PG (ref 26–33)
MCHC RBC AUTO-ENTMCNC: 28.3 GM/DL (ref 32.2–35.5)
MCV RBC AUTO: 69.5 FL (ref 81–99)
PLATELET # BLD AUTO: 149 THOU/MM3 (ref 130–400)
PMV BLD AUTO: ABNORMAL FL (ref 9.4–12.4)
POTASSIUM SERPL-SCNC: 3.8 MEQ/L (ref 3.5–5.2)
PROT SERPL-MCNC: 5.7 G/DL (ref 6.1–8)
RBC # BLD AUTO: 4.63 MILL/MM3 (ref 4.2–5.4)
SCAN OF BLOOD SMEAR: NORMAL
SODIUM SERPL-SCNC: 138 MEQ/L (ref 135–145)
WBC # BLD AUTO: 4.1 THOU/MM3 (ref 4.8–10.8)

## 2024-08-27 PROCEDURE — 2060000000 HC ICU INTERMEDIATE R&B

## 2024-08-27 PROCEDURE — 74178 CT ABD&PLV WO CNTR FLWD CNTR: CPT

## 2024-08-27 PROCEDURE — 6360000002 HC RX W HCPCS: Performed by: STUDENT IN AN ORGANIZED HEALTH CARE EDUCATION/TRAINING PROGRAM

## 2024-08-27 PROCEDURE — NBSRV NON-BILLABLE SERVICE: Performed by: INTERNAL MEDICINE

## 2024-08-27 PROCEDURE — 80053 COMPREHEN METABOLIC PANEL: CPT

## 2024-08-27 PROCEDURE — 2580000003 HC RX 258: Performed by: SPECIALIST

## 2024-08-27 PROCEDURE — 6360000002 HC RX W HCPCS: Performed by: INTERNAL MEDICINE

## 2024-08-27 PROCEDURE — 2580000003 HC RX 258: Performed by: STUDENT IN AN ORGANIZED HEALTH CARE EDUCATION/TRAINING PROGRAM

## 2024-08-27 PROCEDURE — 85027 COMPLETE CBC AUTOMATED: CPT

## 2024-08-27 PROCEDURE — 6370000000 HC RX 637 (ALT 250 FOR IP): Performed by: STUDENT IN AN ORGANIZED HEALTH CARE EDUCATION/TRAINING PROGRAM

## 2024-08-27 PROCEDURE — 99223 1ST HOSP IP/OBS HIGH 75: CPT | Performed by: INTERNAL MEDICINE

## 2024-08-27 PROCEDURE — 2580000003 HC RX 258: Performed by: INTERNAL MEDICINE

## 2024-08-27 PROCEDURE — 99233 SBSQ HOSP IP/OBS HIGH 50: CPT | Performed by: STUDENT IN AN ORGANIZED HEALTH CARE EDUCATION/TRAINING PROGRAM

## 2024-08-27 PROCEDURE — 6360000004 HC RX CONTRAST MEDICATION: Performed by: STUDENT IN AN ORGANIZED HEALTH CARE EDUCATION/TRAINING PROGRAM

## 2024-08-27 PROCEDURE — 6370000000 HC RX 637 (ALT 250 FOR IP): Performed by: INTERNAL MEDICINE

## 2024-08-27 PROCEDURE — 36415 COLL VENOUS BLD VENIPUNCTURE: CPT

## 2024-08-27 PROCEDURE — 6360000002 HC RX W HCPCS: Performed by: SPECIALIST

## 2024-08-27 RX ORDER — CIPROFLOXACIN 2 MG/ML
400 INJECTION, SOLUTION INTRAVENOUS EVERY 12 HOURS
Status: DISCONTINUED | OUTPATIENT
Start: 2024-08-27 | End: 2024-08-30

## 2024-08-27 RX ORDER — IOPAMIDOL 755 MG/ML
80 INJECTION, SOLUTION INTRAVASCULAR
Status: COMPLETED | OUTPATIENT
Start: 2024-08-27 | End: 2024-08-27

## 2024-08-27 RX ADMIN — CYANOCOBALAMIN 1000 MCG: 1000 INJECTION, SOLUTION INTRAMUSCULAR; SUBCUTANEOUS at 17:30

## 2024-08-27 RX ADMIN — SODIUM CHLORIDE 125 MG: 9 INJECTION, SOLUTION INTRAVENOUS at 11:37

## 2024-08-27 RX ADMIN — CEFTRIAXONE SODIUM 1000 MG: 1 INJECTION, POWDER, FOR SOLUTION INTRAMUSCULAR; INTRAVENOUS at 12:37

## 2024-08-27 RX ADMIN — PANTOPRAZOLE SODIUM 40 MG: 40 INJECTION, POWDER, FOR SOLUTION INTRAVENOUS at 19:58

## 2024-08-27 RX ADMIN — ASPIRIN 300 MG: 300 SUPPOSITORY RECTAL at 07:53

## 2024-08-27 RX ADMIN — ATORVASTATIN CALCIUM 40 MG: 40 TABLET, FILM COATED ORAL at 19:58

## 2024-08-27 RX ADMIN — CIPROFLOXACIN 400 MG: 400 INJECTION, SOLUTION INTRAVENOUS at 13:14

## 2024-08-27 RX ADMIN — SODIUM CHLORIDE, POTASSIUM CHLORIDE, SODIUM LACTATE AND CALCIUM CHLORIDE: 600; 310; 30; 20 INJECTION, SOLUTION INTRAVENOUS at 08:03

## 2024-08-27 RX ADMIN — PANTOPRAZOLE SODIUM 40 MG: 40 INJECTION, POWDER, FOR SOLUTION INTRAVENOUS at 12:38

## 2024-08-27 RX ADMIN — AMLODIPINE BESYLATE 10 MG: 10 TABLET ORAL at 17:26

## 2024-08-27 RX ADMIN — IOPAMIDOL 80 ML: 755 INJECTION, SOLUTION INTRAVENOUS at 09:43

## 2024-08-27 RX ADMIN — CLOPIDOGREL BISULFATE 75 MG: 75 TABLET ORAL at 17:29

## 2024-08-27 RX ADMIN — SODIUM CHLORIDE, POTASSIUM CHLORIDE, SODIUM LACTATE AND CALCIUM CHLORIDE: 600; 310; 30; 20 INJECTION, SOLUTION INTRAVENOUS at 23:36

## 2024-08-27 RX ADMIN — THIAMINE HYDROCHLORIDE 100 MG: 100 INJECTION, SOLUTION INTRAMUSCULAR; INTRAVENOUS at 17:32

## 2024-08-27 NOTE — PALLIATIVE CARE
Initial Evaluation        Patient:   Mila Andrade  YOB: 1950  Age:  74 y.o.  Room:  [facility]14/014-  MRN:  [id number]   Acct: [id number]    Date of Admission:  8/23/2024 12:05 PM  Date of Service:  8/27/2024  Completed By:  Lindsay Regan RN        Reason for Palliative Care Evaluation:-   Goals of Care     History    CVA, anemia. Per chart review, patient in non-compliant with medications and has not been taking anything for approximately 6 months. Patient had a CVA in 2020, never followed up with neuro. Fall in 2023 d/t alcohol intoxication resulting in a subarachnoid hemorrhage. CT found uterine masses at that time, patient declined gynecological follow up. Patient present to ED for AMS. New infarcts found. Patient with garbled speech, failed speech eval. NG placed. MBS planned for when patient more appropriate. Patient has JODI planned for today.      Goals of Care Discussions and Plan         Family/Patient Discussion:- Patient [place] for testing. Will try again at a later time.     1:30- Patient resting in bed with eyes closed.  in bedside chair also resting with eyes closed. Did not disturb at this time.     Plan/Follow-Up:-   Palliative care will attempt to revisit at a later time. Please contact if needs arise. Will continue to follow.     Code Status:Limited Limited Code details: Intubation/Re-intubation No; Defibrillation/Cardioversion No; Chest Compressions No; Resuscitative Medications No; Other No Comment    ACP documents on file:  -None    Healthcare Power of /Healthcare Surrogate Decision Makers:    Anil Andrade (): [phone]            Electronically signed by Lindsay Regan RN on 8/27/2024 at 2:17 PM           Palliative Care Office: [phone]

## 2024-08-27 NOTE — CARE COORDINATION
8/27/24, 3:36 PM EDT    DISCHARGE PLANNING EVALUATION    This SW did meet with patient's spouse earlier in afternoon and discussed SNF placement. He reports that he has not chosen one yet, but is reviewing. SW did provide him a shorter list of in-network facilities in the [facility] area as requested by spouse. Asked that he have two-three choices picked for SW tomorrow.

## 2024-08-27 NOTE — PROGRESS NOTES
[facility]  SPEECH THERAPY MISSED TREATMENT NOTE  [facility] 4A      Date: 2024  Patient Name: Mila Andrade        MRN: [id number]    : 1950  (74 y.o.)    REASON FOR MISSED TREATMENT:  Attempted to see patient for speech-language cognitive evaluation and dysphagia management interventions, however JASON Nava reports patient is about to travel [place] for CT scan and scheduled for a JODI later this date and needs to remain NPO. Will follow up on sequential date as patient is available/appropriate.     Keily Humphreys M.A., CCC-SLP [id number]

## 2024-08-27 NOTE — PROGRESS NOTES
[facility]  PHYSICAL THERAPY MISSED TREATMENT NOTE  STR[facility] NEUROSCIENCES 4A    Date: 2024  Patient Name: Mila Andrade        MRN: [id number]   : 1950  (74 y.o.)  Gender: female   Referring Practitioner: Geremias Thomas DO  Diagnosis: Confusion         REASON FOR MISSED TREATMENT:  Pt off unit for CT and scheduled for JODI later today, will check back later if able .

## 2024-08-27 NOTE — PLAN OF CARE
Problem: Discharge Planning  Goal: Discharge to home or other facility with appropriate resources  by Elva Schmid RN  Outcome: Progressing  Discharge to home or other facility with appropriate resources:   Identify barriers to discharge with patient and caregiver   Arrange for needed discharge resources and transportation as appropriate   Identify discharge learning needs (meds, wound care, etc)   Refer to discharge planning if patient needs post-hospital services based on physician order or complex needs related to functional status, cognitive ability or social support system     Problem: Safety - Adult  Goal: Free from fall injury  by Elva Schmid RN  Outcome: Progressing  Free From Fall Injury: Instruct family/caregiver on patient safety     Problem: Pain  Goal: Verbalizes/displays adequate comfort level or baseline comfort level  by Elva Schmid RN  Outcome: Progressing  Verbalizes/displays adequate comfort level or baseline comfort level:   Encourage patient to monitor pain and request assistance   Assess pain using appropriate pain scale   Administer analgesics based on type and severity of pain and evaluate response   Implement non-pharmacological measures as appropriate and evaluate response   Consider cultural and social influences on pain and pain management   Notify Licensed Independent Practitioner if interventions unsuccessful or patient reports new pain     Problem: Skin/Tissue Integrity  Goal: Absence of new skin breakdown  Description: 1.  Monitor for areas of redness and/or skin breakdown  2.  Assess vascular access sites hourly  3.  Every 4-6 hours minimum:  Change oxygen saturation probe site  4.  Every 4-6 hours:  If on nasal continuous positive airway pressure, respiratory therapy assess nares and determine need for appliance change or resting period.  by Elva Schmid RN  Outcome: Progressing  Note: No signs of skin breakdown.  Skin warm, dry, and intact.  Mucous membranes  pink and moist.  Assistance with turns/ambulation provided PRN.  Will continue to monitor.       Problem: Neurosensory - Adult  Goal: Achieves stable or improved neurological status  by Elva Schmid RN  Outcome: Progressing  Achieves stable or improved neurological status:   Assess for and report changes in neurological status   Initiate measures to prevent increased intracranial pressure   Maintain blood pressure and fluid volume within ordered parameters to optimize cerebral perfusion and minimize risk of hemorrhage     Problem: Hematologic - Adult  Goal: Maintains hematologic stability  Outcome: Progressing  Maintains hematologic stability:   Assess for signs and symptoms of bleeding or hemorrhage   Monitor labs for bleeding or clotting disorders   Care plan reviewed with patient.  Patient verbalizes understanding of the plan of care and contributed to goal setting.

## 2024-08-27 NOTE — PROGRESS NOTES
This nurse just got off the phone with an update on time of JODI given to Anil, pt .  Anil will be up here in a half hour. Dr. Finley notified face to face and stated he will see him downstairs prior to the JODI for consent.

## 2024-08-27 NOTE — PROGRESS NOTES
[facility]  OCCUPATIONAL THERAPY MISSED TREATMENT NOTE  STR NEUROSCIENCES 4A  4A-14/014-A      Date: 2024  Patient Name: Mila Andrade        CSN: [id number]   : 1950  (74 y.o.)  Gender: female   Referring Practitioner: Geremias Thomas DO  Diagnosis: Confusion         REASON FOR MISSED TREATMENT:  Patient laying in bed upon arrival; acknowledges this writer, slurred speech, however agreeable to OT. Patient spouse present.  Upon beginning session, transport arrived to take patient to CT for CT of abdomin.  Nursing notes patient to have JODI later today, will attempt next available time.

## 2024-08-27 NOTE — PROGRESS NOTES
JODI is unscheduled at this time. This nurse left a message on the voicemail of number [phone]. No call back at this time.     1630 on 8/28 and fast for 12 hrs prior. NPO at midnight.   1700 this nurse updated pt family.

## 2024-08-27 NOTE — PLAN OF CARE
Neurology is following patient peripherally to assess patient's hemoglobin.  Hemoglobin stable at 9.6 this morning.  Continue aspirin 81 mg, Plavix 75 mg unless considered unsafe and patient's clinical course. Follow up with outpatient general neurology Dr. Murray in 2-4 weeks. No further work-up or recommendations per neurology, we will sign off at this time.  Please call with any questions or if we can be of additional assistance.  Thank you for this consult.    This case was discussed with Dr. Ozuna and he is in agreement with the assessment and plan.    Electronically signed by SID Tidwell CNP on 8/27/24 at 6:20 PM EDT

## 2024-08-27 NOTE — PROGRESS NOTES
Hospitalist Progress Note  Internal Medicine Resident      Patient: Mila Andrade 74 y.o. female      Unit/Bed: -14/014-A    Admit Date: 8/23/2024      ASSESSMENT AND PLAN  Active Problems  Multiple infarcts involving the right frontal lobe right corona radiata right parietal lobe and punctate infarcts in both cerebellar hemisphere: Hx of previous strokes ist stroke was 2014 stroke in L frontal lobe. It appears she went to see her family physician following stroke but didn't do any other follow-up. Was noncompliant on meds. Had a L temporal lobe stroke in 2020. Per  has not taken any meds for the past 6 months. Multiple infarcts seen on MRI. Speech saw recommend NPO until MBS. Not a candidate for MBS currently.  agrees to NG tube placement.   -Place NG tube.  -Consulted dietician for tube feeds.   -Will start all meds after NG placed.   -Due to pt noncompliance her goals of care are in question. Have consulted Palliative care.     Encephalopathy: likely 2/2 above recent stroke. Nursing and family report waxing and waning mentation. There may be a component of hospital acquired delirium.   -PT OT when more awake .  -Delirium precautions.     Acute anemia and shows iron deficiency: No evidence of bleed at this time. LDH normal. Retic count is low. Likely a bone marrow problem. With low iron may be a resource problem. Pt had Hgb 6.7 on admit. Received 1 unit. Was stable around 7.2. Dropped to 6.5 got another unit. Now 9.6.   -Hemoccult stools are pending  -eventual GI workup for Iron def  -Hematology consulted.   -On protonix 40 bid.   -Have started Ferrilecit qd x 3 doses.     Leukopenia: Vitamin B12 and folate level are normal. Severe iron deficiency noted  -Hematology has been consulted.   -Started on Ferrlecit 125 qd x 3.     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 Seems largely unchanged. Will consider Pelvic us pending clinical course.     Resolved Problems    Chronic Conditions

## 2024-08-27 NOTE — PROGRESS NOTES
Physician Progress Note      PATIENT:               LINDA ALEXIS  CSN #:                  [id number]  :                       1950  ADMIT DATE:       2024 12:05 PM  DISCH DATE:  RESPONDING  PROVIDER #:        Nikko Chavez MD          QUERY TEXT:    Dr Chavez, Dr Jeffers,    Pt admitted with CVA and has meets Moderate Malnutrition per dietician ASPEN   criteria. Please further specify type of malnutrition with documentation in   the medical record.    The medical record reflects the following:  Risk Factors: CVA  Clinical Indicators: ASPEN: Energy Intake:  Mild decrease in energy intake   (Comment) (has had poor PO intakes since 2017) & Weight Loss:  No significant   weight loss  & Body Fat Loss:  Mild body fat loss Orbital & Muscle Mass Loss:    Mild muscle mass loss Temples (temporalis), Clavicles (pectoralis &   deltoids), Hand (interosseous) & Fluid Accumulation:  No significant fluid   accumulation  Treatment: Jevity 1.2 at 10 ml/hr and increase by 10 ml q6hr to goal rate of   50 ml/hr.    ASPEN Criteria:    https://aspenjournals.onlinelibrary.chacko.com/doi/full/10.1177/497302906839193  5  Options provided:  -- Moderate Malnutrition  -- No Moderate Protein calorie malnutrition  -- Other - I will add my own diagnosis  -- Disagree - Not applicable / Not valid  -- Disagree - Clinically unable to determine / Unknown  -- Refer to Clinical Documentation Reviewer    PROVIDER RESPONSE TEXT:    This patient has moderate malnutrition.    Query created by: Shelby Resendez on 2024 7:21 AM      Electronically signed by:  Nikko Chavez MD 2024 8:15 AM

## 2024-08-27 NOTE — PROGRESS NOTES
This nurse reached out to endo regarding the time for the JODI.  They stated that she is on for tomorrow now and no availability left for today.  The endo nurse called back and stated the pt is also not on for tomorrow and Dr. Finley needs to be contacted.

## 2024-08-27 NOTE — PROGRESS NOTES
Hematology:  Severe iron deficiency anemia.  Patient is getting IV iron.  Hemoglobin is improving.  Plavix and aspirin has been on hold.  B12 deficiency.  Patient is getting B12 shots.  CVA.  I will continue monitoring blood count.  Gladys Holloway MD, FACP

## 2024-08-27 NOTE — SIGNIFICANT EVENT
Notified patient had pulled NG out at shift change causing bleeding, was in wrist restraints this at shift change.   Patient is appears agitated and continuously trying to pull out NG again.   Will update primary team and attending in the morning

## 2024-08-27 NOTE — CARE COORDINATION
8/27/24, 1:47 PM EDT    DISCHARGE ON GOING EVALUATION    Mila MEYER [facility] day: 4  Location: 4A-14/014-A Reason for admit: Confusion [R41.0]  Anemia [D64.9]  Altered mental status, unspecified altered mental status type [R41.82]  Iron deficiency anemia, unspecified iron deficiency anemia type [D50.9]  Cerebrovascular accident (CVA) due to bilateral stenosis of middle cerebral arteries (HCC) [I63.513]     Procedures:   8/23 EEG: This EEG was abnormal due to:  -Diffuse background slowing and disorganization  -Asymmetric right hemispheric slowing  8/24 ECHO: EF 55-60%.   8/27 JODI pending    Imaging since last note:   8/26 CT A/P: Mural wall thickening is seen within the mid sigmoid and rectosigmoid colon with air-fluid level identified in the rectosigmoid colon possibly indicative of colitis. Colonic diverticulosis is also visualized in this location and differential considerations include diverticulitis. No bowel obstruction, free fluid, fluid collection, or free air is visualized. No significant pericolonic fat stranding is observed. Follow-up to ensure resolution is advised. 2. The spleen remains enlarged measuring up to 16 cm in length. A linear low-attenuation focus within the body of the spleen measures 8 x 18 mm (series 603, image 29) with differential considerations including infarct, old injury, infiltrative process not excluded. Correlate with hematologic values. 3. The nonenhancing cystic structure in the right adnexa is slightly smaller measuring 30 x 25 mm (previously measured up to 35 mm). The uterus remains enlarged with a lobulated contour possibly indicating underlying uterine fibroids. Pelvic ultrasound and contrast-enhanced pelvic MRI are more sensitive modalities better suited for characterization of these findings. 4. Stable benign 27 mm left adrenal myelolipoma    Barriers to Discharge: Hospitalist, Hem/Onc, Cardiology and Palliative following. PT/OT/SLP. Pt pulled out NG while in

## 2024-08-28 ENCOUNTER — ANESTHESIA EVENT (OUTPATIENT)
Dept: ENDOSCOPY | Age: 74
End: 2024-08-28
Payer: MEDICARE

## 2024-08-28 ENCOUNTER — APPOINTMENT (OUTPATIENT)
Age: 74
End: 2024-08-28
Attending: INTERNAL MEDICINE
Payer: MEDICARE

## 2024-08-28 ENCOUNTER — ANESTHESIA (OUTPATIENT)
Dept: ENDOSCOPY | Age: 74
End: 2024-08-28
Payer: MEDICARE

## 2024-08-28 LAB
ANION GAP SERPL CALC-SCNC: 11 MEQ/L (ref 8–16)
BUN SERPL-MCNC: 4 MG/DL (ref 7–22)
CALCIUM SERPL-MCNC: 8.3 MG/DL (ref 8.5–10.5)
CHLORIDE SERPL-SCNC: 103 MEQ/L (ref 98–111)
CO2 SERPL-SCNC: 25 MEQ/L (ref 23–33)
CREAT SERPL-MCNC: 0.6 MG/DL (ref 0.4–1.2)
DEPRECATED RDW RBC AUTO: 56.8 FL (ref 35–45)
ECHO BSA: 1.57 M2
ERYTHROCYTE [DISTWIDTH] IN BLOOD BY AUTOMATED COUNT: 24.2 % (ref 11.5–14.5)
GFR SERPL CREATININE-BSD FRML MDRD: > 90 ML/MIN/1.73M2
GLUCOSE SERPL-MCNC: 94 MG/DL (ref 70–108)
HCT VFR BLD AUTO: 33.6 % (ref 37–47)
HGB BLD-MCNC: 9.6 GM/DL (ref 12–16)
MCH RBC QN AUTO: 19.7 PG (ref 26–33)
MCHC RBC AUTO-ENTMCNC: 28.6 GM/DL (ref 32.2–35.5)
MCV RBC AUTO: 68.9 FL (ref 81–99)
PLATELET # BLD AUTO: 151 THOU/MM3 (ref 130–400)
PMV BLD AUTO: ABNORMAL FL (ref 9.4–12.4)
POTASSIUM SERPL-SCNC: 3.4 MEQ/L (ref 3.5–5.2)
RBC # BLD AUTO: 4.88 MILL/MM3 (ref 4.2–5.4)
SODIUM SERPL-SCNC: 139 MEQ/L (ref 135–145)
WBC # BLD AUTO: 4.5 THOU/MM3 (ref 4.8–10.8)

## 2024-08-28 PROCEDURE — 3700000001 HC ADD 15 MINUTES (ANESTHESIA): Performed by: INTERNAL MEDICINE

## 2024-08-28 PROCEDURE — 7100000010 HC PHASE II RECOVERY - FIRST 15 MIN: Performed by: INTERNAL MEDICINE

## 2024-08-28 PROCEDURE — B24BZZ4 ULTRASONOGRAPHY OF HEART WITH AORTA, TRANSESOPHAGEAL: ICD-10-PCS | Performed by: STUDENT IN AN ORGANIZED HEALTH CARE EDUCATION/TRAINING PROGRAM

## 2024-08-28 PROCEDURE — 2580000003 HC RX 258: Performed by: STUDENT IN AN ORGANIZED HEALTH CARE EDUCATION/TRAINING PROGRAM

## 2024-08-28 PROCEDURE — 6360000002 HC RX W HCPCS: Performed by: STUDENT IN AN ORGANIZED HEALTH CARE EDUCATION/TRAINING PROGRAM

## 2024-08-28 PROCEDURE — 80048 BASIC METABOLIC PNL TOTAL CA: CPT

## 2024-08-28 PROCEDURE — 93325 DOPPLER ECHO COLOR FLOW MAPG: CPT

## 2024-08-28 PROCEDURE — 6370000000 HC RX 637 (ALT 250 FOR IP): Performed by: STUDENT IN AN ORGANIZED HEALTH CARE EDUCATION/TRAINING PROGRAM

## 2024-08-28 PROCEDURE — 93312 ECHO TRANSESOPHAGEAL: CPT | Performed by: INTERNAL MEDICINE

## 2024-08-28 PROCEDURE — 2580000003 HC RX 258: Performed by: SPECIALIST

## 2024-08-28 PROCEDURE — 6360000002 HC RX W HCPCS: Performed by: INTERNAL MEDICINE

## 2024-08-28 PROCEDURE — 97110 THERAPEUTIC EXERCISES: CPT

## 2024-08-28 PROCEDURE — 93320 DOPPLER ECHO COMPLETE: CPT | Performed by: INTERNAL MEDICINE

## 2024-08-28 PROCEDURE — 99232 SBSQ HOSP IP/OBS MODERATE 35: CPT | Performed by: STUDENT IN AN ORGANIZED HEALTH CARE EDUCATION/TRAINING PROGRAM

## 2024-08-28 PROCEDURE — 36415 COLL VENOUS BLD VENIPUNCTURE: CPT

## 2024-08-28 PROCEDURE — 7100000011 HC PHASE II RECOVERY - ADDTL 15 MIN: Performed by: INTERNAL MEDICINE

## 2024-08-28 PROCEDURE — 97530 THERAPEUTIC ACTIVITIES: CPT

## 2024-08-28 PROCEDURE — 97116 GAIT TRAINING THERAPY: CPT

## 2024-08-28 PROCEDURE — 6360000002 HC RX W HCPCS: Performed by: REGISTERED NURSE

## 2024-08-28 PROCEDURE — 2580000003 HC RX 258: Performed by: INTERNAL MEDICINE

## 2024-08-28 PROCEDURE — 93325 DOPPLER ECHO COLOR FLOW MAPG: CPT | Performed by: INTERNAL MEDICINE

## 2024-08-28 PROCEDURE — 85027 COMPLETE CBC AUTOMATED: CPT

## 2024-08-28 PROCEDURE — 6370000000 HC RX 637 (ALT 250 FOR IP)

## 2024-08-28 PROCEDURE — 3700000000 HC ANESTHESIA ATTENDED CARE: Performed by: INTERNAL MEDICINE

## 2024-08-28 PROCEDURE — 2709999900 HC NON-CHARGEABLE SUPPLY: Performed by: INTERNAL MEDICINE

## 2024-08-28 PROCEDURE — 2060000000 HC ICU INTERMEDIATE R&B

## 2024-08-28 PROCEDURE — 2500000003 HC RX 250 WO HCPCS: Performed by: STUDENT IN AN ORGANIZED HEALTH CARE EDUCATION/TRAINING PROGRAM

## 2024-08-28 PROCEDURE — 6360000002 HC RX W HCPCS: Performed by: SPECIALIST

## 2024-08-28 RX ORDER — IPRATROPIUM BROMIDE AND ALBUTEROL SULFATE 2.5; .5 MG/3ML; MG/3ML
1 SOLUTION RESPIRATORY (INHALATION) ONCE
Status: DISCONTINUED | OUTPATIENT
Start: 2024-08-28 | End: 2024-08-28

## 2024-08-28 RX ORDER — PROPOFOL 10 MG/ML
INJECTION, EMULSION INTRAVENOUS PRN
Status: DISCONTINUED | OUTPATIENT
Start: 2024-08-28 | End: 2024-08-28 | Stop reason: SDUPTHER

## 2024-08-28 RX ORDER — ASPIRIN 81 MG/1
81 TABLET, CHEWABLE ORAL DAILY
Status: DISCONTINUED | OUTPATIENT
Start: 2024-08-28 | End: 2024-09-05 | Stop reason: HOSPADM

## 2024-08-28 RX ORDER — POTASSIUM CHLORIDE 7.45 MG/ML
10 INJECTION INTRAVENOUS
Status: DISPENSED | OUTPATIENT
Start: 2024-08-28 | End: 2024-08-28

## 2024-08-28 RX ORDER — FOLIC ACID 5 MG/ML
1 INJECTION, SOLUTION INTRAMUSCULAR; INTRAVENOUS; SUBCUTANEOUS DAILY
Status: DISCONTINUED | OUTPATIENT
Start: 2024-08-28 | End: 2024-08-28

## 2024-08-28 RX ORDER — ALBUTEROL SULFATE 0.83 MG/ML
2.5 SOLUTION RESPIRATORY (INHALATION) ONCE
Status: COMPLETED | OUTPATIENT
Start: 2024-08-28 | End: 2024-08-28

## 2024-08-28 RX ADMIN — CLOPIDOGREL BISULFATE 75 MG: 75 TABLET ORAL at 09:59

## 2024-08-28 RX ADMIN — AMLODIPINE BESYLATE 10 MG: 10 TABLET ORAL at 09:59

## 2024-08-28 RX ADMIN — ASPIRIN 81 MG 81 MG: 81 TABLET ORAL at 09:58

## 2024-08-28 RX ADMIN — PANTOPRAZOLE SODIUM 40 MG: 40 INJECTION, POWDER, FOR SOLUTION INTRAVENOUS at 20:09

## 2024-08-28 RX ADMIN — POTASSIUM CHLORIDE 10 MEQ: 7.46 INJECTION, SOLUTION INTRAVENOUS at 16:17

## 2024-08-28 RX ADMIN — PROPOFOL 50 MG: 10 INJECTION, EMULSION INTRAVENOUS at 13:00

## 2024-08-28 RX ADMIN — FOLIC ACID 1 MG: 5 INJECTION, SOLUTION INTRAMUSCULAR; INTRAVENOUS; SUBCUTANEOUS at 10:26

## 2024-08-28 RX ADMIN — POTASSIUM CHLORIDE 10 MEQ: 7.46 INJECTION, SOLUTION INTRAVENOUS at 15:13

## 2024-08-28 RX ADMIN — POTASSIUM CHLORIDE 10 MEQ: 7.46 INJECTION, SOLUTION INTRAVENOUS at 10:21

## 2024-08-28 RX ADMIN — PANTOPRAZOLE SODIUM 40 MG: 40 INJECTION, POWDER, FOR SOLUTION INTRAVENOUS at 09:51

## 2024-08-28 RX ADMIN — THIAMINE HYDROCHLORIDE 100 MG: 100 INJECTION, SOLUTION INTRAMUSCULAR; INTRAVENOUS at 09:57

## 2024-08-28 RX ADMIN — SODIUM CHLORIDE, POTASSIUM CHLORIDE, SODIUM LACTATE AND CALCIUM CHLORIDE: 600; 310; 30; 20 INJECTION, SOLUTION INTRAVENOUS at 15:15

## 2024-08-28 RX ADMIN — ALBUTEROL SULFATE 2.5 MG: 2.5 SOLUTION RESPIRATORY (INHALATION) at 13:32

## 2024-08-28 RX ADMIN — PROPOFOL 50 MG: 10 INJECTION, EMULSION INTRAVENOUS at 12:58

## 2024-08-28 RX ADMIN — PROPOFOL 30 MG: 10 INJECTION, EMULSION INTRAVENOUS at 13:06

## 2024-08-28 RX ADMIN — CYANOCOBALAMIN 1000 MCG: 1000 INJECTION, SOLUTION INTRAMUSCULAR; SUBCUTANEOUS at 09:51

## 2024-08-28 RX ADMIN — PROPOFOL 50 MG: 10 INJECTION, EMULSION INTRAVENOUS at 13:03

## 2024-08-28 RX ADMIN — CEFTRIAXONE SODIUM 1000 MG: 1 INJECTION, POWDER, FOR SOLUTION INTRAMUSCULAR; INTRAVENOUS at 17:31

## 2024-08-28 RX ADMIN — POTASSIUM CHLORIDE 10 MEQ: 7.46 INJECTION, SOLUTION INTRAVENOUS at 14:07

## 2024-08-28 RX ADMIN — SODIUM CHLORIDE 125 MG: 9 INJECTION, SOLUTION INTRAVENOUS at 18:16

## 2024-08-28 ASSESSMENT — PAIN - FUNCTIONAL ASSESSMENT: PAIN_FUNCTIONAL_ASSESSMENT: 0-10

## 2024-08-28 NOTE — H&P
Nellie Wilcox MD, Stopped at 08/25/24 1758    hydrALAZINE (APRESOLINE) injection 10 mg, 10 mg, IntraVENous, Q6H PRN, Nellie Wilcox MD, 10 mg at 08/26/24 1725    0.9 % sodium chloride infusion, , IntraVENous, PRN, Karin Box APRN - CNP    0.9 % sodium chloride infusion, , IntraVENous, PRN, Ana Carr APRN - CNP    lactated ringers IV soln infusion, , IntraVENous, Continuous, Nellie Wilcox MD, Last Rate: 75 mL/hr at 08/28/24 0340, Rate Verify at 08/28/24 0340    pantoprazole (PROTONIX) injection 40 mg, 40 mg, IntraVENous, BID, Nellie Wilcox MD, 40 mg at 08/28/24 0951    sodium chloride flush 0.9 % injection 5-40 mL, 5-40 mL, IntraVENous, 2 times per day, AntGeremias joyce, DO, 10 mL at 08/26/24 1151    sodium chloride flush 0.9 % injection 5-40 mL, 5-40 mL, IntraVENous, PRN, AntGeremias joyce, DO    0.9 % sodium chloride infusion, , IntraVENous, PRN, AntGeremias joyce, DO    potassium chloride (KLOR-CON M) extended release tablet 40 mEq, 40 mEq, Oral, PRN **OR** potassium bicarb-citric acid (EFFER-K) effervescent tablet 40 mEq, 40 mEq, Oral, PRN **OR** potassium chloride 10 mEq/100 mL IVPB (Peripheral Line), 10 mEq, IntraVENous, PRN, AntGeremias joyce, DO    magnesium sulfate 2000 mg in 50 mL IVPB premix, 2,000 mg, IntraVENous, PRN, Antalis, Geremias, DO    ondansetron (ZOFRAN-ODT) disintegrating tablet 4 mg, 4 mg, Oral, Q8H PRN **OR** ondansetron (ZOFRAN) injection 4 mg, 4 mg, IntraVENous, Q6H PRN, Antmaria del carmen, Geremias, DO    polyethylene glycol (GLYCOLAX) packet 17 g, 17 g, Oral, Daily PRN, Antmaria del carmen, Geremias, DO    acetaminophen (TYLENOL) tablet 650 mg, 650 mg, Oral, Q6H PRN **OR** acetaminophen (TYLENOL) suppository 650 mg, 650 mg, Rectal, Q6H PRN, Antmaria del carmen, Geremias, DO    clopidogrel (PLAVIX) tablet 75 mg, 75 mg, Oral, Daily, Antalis, Geremias, DO, 75 mg at 08/28/24 0959    atorvastatin (LIPITOR) tablet 40 mg, 40 mg, Oral, Nightly, Antmaria del carmen, Geremias, DO, 40 mg at  08/27/24 1958  Prior to Admission medications    Medication Sig Start Date End Date Taking? Authorizing Provider   amLODIPine (NORVASC) 10 MG tablet Take 1 tablet by mouth daily 7/27/23 7/21/24  Saundra Nelson APRN - CNP   hydroCHLOROthiazide (HYDRODIURIL) 25 MG tablet Take 1 tablet by mouth daily 7/27/23   Saundra Nelson APRN - CNP   aspirin 81 MG tablet Take 1 tablet by mouth daily DO NOT TAKE UNTIL YOU ARE ADVISED TO RESTART BY NEUROSURGEON, DR. PINO 8/4/23   Jennifer Montesinos APRN - CNP   clopidogrel (PLAVIX) 75 MG tablet Take 1 tablet by mouth daily DO NOT TAKE UNTIL YOU ARE ADVISED TO RESTART BY NEUROSURGEONDR. PINO 8/4/23   Jennifer Montesinos APRN - CNP   Coenzyme Q10 (COQ10) 100 MG CAPS Take 100 mg by mouth daily    Provider, Historical, MD   Handicap Placard MISC by Does not apply route Expires 12/17/23 12/17/19   Gene Griffin DO   acetaminophen 650 MG TABS Take 650 mg by mouth every 4 hours as needed. 11/10/14   Matt Gtz MD     Additional information:       VITAL SIGNS   Vitals:    08/28/24 1124   BP: (!) 193/78   Pulse: 57   Resp: 18   Temp: 98 °F (36.7 °C)   SpO2: 99%       PHYSICAL:   General: No acute distress  HEENT:  Unremarkable for age  Neck: without increased JVD, carotid pulses 2+ bilaterally without bruits  Heart: RRR, S1 & S2 WNL, S4 gallop, without murmurs or rubs   NYHA: 2  Lungs: Clear to auscultation    Abdomen: BS present, without HSM, masses, or tenderness    Extremities: without C,C,E.  Pulses 2+ bilaterally  Mental Status: Alert & Oriented x 1        PLANNED PROCEDURE   []Cath  []PCI                []Pacemaker/AICD  [x]JODI             []Cardioversion []Peripheral angiography/PTA  []Other:      SEDATION  Planned agent:[x]Midazolam []Meperidine [x]Sublimaze []Morphine  []Diazepam  []Other:     ASA Classification:  []1 []2 [x]3 []4 []5  Class 1: A normal healthy patient  Class 2: Pt with mild to moderate systemic disease  Class 3: Severe systemic disease or

## 2024-08-28 NOTE — PROGRESS NOTES
Stroke Folder given.   What is Stroke/CVA  Signs and Symptoms of stroke (BEFAST)  Treatments for Stroke  Personal Risk Factors for Stroke discussed  Education--Call 911      Patient/family has been educated on their personal risk factors of:  Hypertension  Previous stroke   Carotid Artery stenosis  anemia    They have been given hand outs on the following medications:(give handouts/attach to AVS)   asa  Plavix  Statins- atorvastatin  Antihypertensive- amlodipine    Treatment for stroke includes:  Risk factor modifications  Following the medication regime prescribed by physician      Educated on FAST-Face-Arm-Speech-Time    A stroke is a brain attack.Stroke is a brain injury. It occurs when the brain's blood supply is interrupted. Blood carries oxygen and nutrients to the brain. Without oxygen and nutrients from blood, brain tissue starts to die rapidly. This can happen in less than 10 minutes.    A stroke occurs when blood flow to the brain is blocked (called ischemic stroke). This is caused by one of the following:   Sudden decreased blood flow   Damage to a blood vessel supplying blood to the brain can occur suddenly from either:   Injury   A clot that forms and breaks off from another part of the body (such as the heart or neck)   There are certain conditions which predispose people to form blood clots, such as:   Cancer   Pregnancy   Atrial fibrillation   Certain autoimmune diseases   Local blood clot   A build-up of fatty substances ( atherosclerotic plaque ) along the inner lining of the artery causes:   Narrowing of artery   Reduced elasticity   Local inflammation   Blood protein defects leading to increased clotting tendency   Decreased blood flow in the artery   Clot in an artery supplying the brain   Inflammatory conditions in the blood vessels (vasculitis)   A stroke may also occur if a blood vessel breaks and bleeds into or around the brain. This is called hemorrhagic stroke.      This condition needs to

## 2024-08-28 NOTE — PROGRESS NOTES
Rolling to Right: Minimal Assistance, with rail, with increased time for completion   Supine to Sit: Maximum Assistance, with increased time for completion, cues for technique   Sit to Supine: Max Assistance, at trunk and LEs and once in bed pt dependent to boost up in bed and dependent to reposition hips and shoulders in bed    Scooting: Maximum Assistance    Transfers:  Sit to Stand: Moderate Assistance, to min assist- pt lacking initiation and needed facilitation for anterior wt shift and she needed assist to place left UE on walker once standing   Stand to Sit:Minimal Assistance, cues and assist for hand placement and to control descend pt tended to demonstrate a post lean lacking hip and knee flexion     Ambulation:  Moderate Assistance- to max assist   Distance: 4 feet x 2, side stepping to HOB to the left   Surface: Level Tile  Device: Rolling Walker  Gait Deviations: pt needed assist at left UE to keep it in contact w/ walker, pt needing assist from therapist to wt shift and assist to guide the walker or she wouldn't have moved,  pt struggled w/ side stepping and leaning heavily to the left and difficult to un weight her left LE     Stairs:  Not Tested    Balance:  Sitting edge of bed unsupported pts balance varied from mod to CGA- noted flexed posture she needed increased time to scan to the left to locate items she would use her right UE to grasp for objects when it was on the right of midline w/ delayed response and to the left of midline she needed hand over hand to complete task as she was able to scan but then when she would attempt to reach she lost contact w/ object she was however trying to use both UEs to complete reaching task   -standing balance at walker pt needed mod assist- pt leaning posterior and to the left - she needed max assist to complete mariposa care and clothing management-  -attempted pre gait activity w/ delvis UEs at support pt needing min to mod assist for balance and struggled to  follow directions for toe tapping to target but needing physical assist for wt shifting   Exercise:  Patient was guided in 1 set(s) 8 reps of exercises to  lower extremities: ,Ankle pumps, Seated marches, Long arc quads, and supine bridges with max cues for exercises w/ a delayed response  .  Exercises were completed for increased independence with functional mobility.    Functional Outcome Measures:  [facility] (6 CLICK) BASIC MOBILITY  AM-PAC Inpatient Mobility Raw Score : 11  AM-PAC Inpatient T-Scale Score : 33.86  Mobility Inpatient CMS 0-100% Score: 72.57        Modified Redmond Scale:  +4 - Moderately severe disability; unable to walk and attend to bodily needs without assistance.   Patient could not live alone and could not walk from one room to another without physical help from another person, but they can sit up in bed without any help.  Education provided regarding stroke rehabilitation management.    ASSESSMENT:  Assessment: Patient continues to demonstrate deficits in Strength, Balance, and Endurance and would benefit from continued skilled PT to address these impairments and return to PLOF.   Activity Tolerance:  Patient tolerance of  treatment:Fair.  Plan: Current Treatment Recommendations: Strengthening, Balance training, Functional mobility training, Endurance training, Safety education & training, Patient/Caregiver education & training, Gait training, Stair training, Neuromuscular re-education, Transfer training, Home exercise program, Equipment evaluation, education, & procurement, Therapeutic activities  General Plan:  (6x C)    Education:  Learners: Patient  Patient Education: Plan of Care    Goals:  Patient Goals : go home  Short Term Goals  Time Frame for Short Term Goals: at discharge  Short Term Goal 1: Pt to be Mod I for supine <> sit to get in/out of bed  Short Term Goal 2: Pt to be Mod I for sit <> stand to get up to ambulate  Short Term Goal 3: Pt to ambulate > 15 ft with RW with Supervision

## 2024-08-28 NOTE — PROGRESS NOTES
Mila admitted to Endo department and admitted to Endo room 12 for JODI with Dr. Finley. Consent form and DNR form signed by pt spouse.  Plan of care reviewed with patient.   Call light within reach.   Bed in lowest position, locked, with one bed rail up.   Appropriate arm bands on patient.   Bathroom offered.   All questions and concerns of patient addressed    Name: Isaias  Relationship to patient: spouse     Clear

## 2024-08-28 NOTE — PROGRESS NOTES
Pt came to this nurse after being down at ENDO with JODI.  Pt is seen with NG tube at 20 and coiled in mouth.  Margaret RN removed NG tube. This nurse talked with pt  at bedside if they are okay with reinserting NG.  Pt , Anil, stated to pt, \"do you want another tube in your nose\" pt stated \"NO\".  Anil stated they would like to hold off and abide by pt. Wishes at this time.  Dr. Jeffers updated.  This nurse asked if we could get another IV in pt.  Pt and  refused and stated to use the one we have.  Dr. Jeffers updated.

## 2024-08-28 NOTE — CARE COORDINATION
8/28/24, 12:12 PM EDT    DISCHARGE PLANNING EVALUATION    Met with patient and her spouse.  His first and second choices for ECF are [facility] and [facility]. Called and made a referral to Anastacio at [facility].

## 2024-08-28 NOTE — PROGRESS NOTES
Report called to 4A nurse -Edna GOMEZ. Notified that NG tube placement will need to be verified before using.

## 2024-08-28 NOTE — PROGRESS NOTES
[facility]  OCCUPATIONAL THERAPY MISSED TREATMENT NOTE  [facility] NEUROSCIENCES 4A  4A-14/014-A      Date: 2024  Patient Name: Mila Andrade        CSN: [id number]   : 1950  (74 y.o.)  Gender: female   Referring Practitioner: Geremias Thomas DO  Diagnosis: Confusion         REASON FOR MISSED TREATMENT: Patient Off Floor for Testing; patient at JODI; OT to check back as able and time allows.

## 2024-08-28 NOTE — PROGRESS NOTES
[facility]  SPEECH THERAPY MISSED TREATMENT NOTE  [facility] NEUROSCIENCES 4A      Date: 2024  Patient Name: Mila Andrade        MRN: [id number]    : 1950  (74 y.o.)    REASON FOR MISSED TREATMENT:  Spoke with JASON Nava for approval of ST interventions. RN reports patient is to remain NPO for JODI completion this date. ST to follow up as patient is available/appropriate.     Keily Humphreys M.A., CCC-SLP [id number]

## 2024-08-28 NOTE — PLAN OF CARE
Problem: Discharge Planning  Goal: Discharge to home or other facility with appropriate resources  by Elva Schmid RN  Outcome: Progressing  Discharge to home or other facility with appropriate resources:   Identify barriers to discharge with patient and caregiver   Arrange for needed discharge resources and transportation as appropriate   Identify discharge learning needs (meds, wound care, etc)   Refer to discharge planning if patient needs post-hospital services based on physician order or complex needs related to functional status, cognitive ability or social support system     Problem: Safety - Adult  Goal: Free from fall injury  by Elva Schmid RN  Outcome: Progressing  Free From Fall Injury: Instruct family/caregiver on patient safety     Problem: Pain  Goal: Verbalizes/displays adequate comfort level or baseline comfort level  by Elva Schmid RN  Outcome: Progressing  Verbalizes/displays adequate comfort level or baseline comfort level:   Encourage patient to monitor pain and request assistance   Assess pain using appropriate pain scale   Administer analgesics based on type and severity of pain and evaluate response   Implement non-pharmacological measures as appropriate and evaluate response   Consider cultural and social influences on pain and pain management   Notify Licensed Independent Practitioner if interventions unsuccessful or patient reports new pain     Problem: Skin/Tissue Integrity  Goal: Absence of new skin breakdown  Description: 1.  Monitor for areas of redness and/or skin breakdown  2.  Assess vascular access sites hourly  3.  Every 4-6 hours minimum:  Change oxygen saturation probe site  4.  Every 4-6 hours:  If on nasal continuous positive airway pressure, respiratory therapy assess nares and determine need for appliance change or resting period.  by Elva Schmid RN  Outcome: Progressing  Note: No signs of skin breakdown.  Skin warm, dry, and intact.  Mucous membranes

## 2024-08-28 NOTE — SIGNIFICANT EVENT
Patient again overnight was agitated and continued to try and pull out her NG tube. Have reissued soft wrist restraints overnight.

## 2024-08-28 NOTE — PROGRESS NOTES
Hospitalist Progress Note  Internal Medicine Resident      Patient: Mila Andrade 74 y.o. female      Unit/Bed: -14/014-A    Admit Date: 8/23/2024      ASSESSMENT AND PLAN  Active Problems  Multiple infarcts involving the right frontal lobe right corona radiata right parietal lobe and punctate infarcts in both cerebellar hemisphere: Concern for embolic stroke. Sent for JODI today. During JODI NG tube was pulled out. Will hold replacement until pt seen by Speech. Pt doesn't want peg tube.   -Speech james, YARIEL prn  -Due to pt noncompliance her goals of care are in question. Have consulted Palliative care.     Encephalopathy: likely 2/2 above recent stroke-Improving: Pt significantly more awake and alert today. Still not oriented to location, time, president, or for the most part situation. But does clearly state she does not want peg tube.   currently agrees with this plan and understands risks and benefits. Having some aphasia, but does participate in conversation and is understanding.  -Restart PT/OT  -Have SLP see.  -Delirium precautions.     Acute anemia and shows iron deficiency: No evidence of bleed at this time. LDH normal. Retic count is low. Likely a bone marrow problem. With low iron may be a resource problem. Pt had Hgb 6.7 on admit. Received 1 unit. Was stable around 7.2. Dropped to 6.5 got another unit. Now 9.6.   -Hemoccult stools are pending  -eventual GI workup for Iron def  -Hematology consulted.   -On protonix 40 bid.   -Have started Ferrilecit qd x 3 doses.     Leukopenia: Vitamin B12 and folate level are normal. Severe iron deficiency noted  -Hematology has been consulted.   -Started on Ferrlecit 125 qd x 3.     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 Seems largely unchanged. Will consider Pelvic us pending clinical course.     Resolved Problems    Chronic Conditions (reviewed and stable unless otherwise stated)        LDA: []CVC / []PICC / []Midline / []Huffman / []Drains /  sulfate, ondansetron **OR** ondansetron, polyethylene glycol, acetaminophen **OR** acetaminophen    Exam:  BP (!) 118/49   Pulse 69   Temp 97.5 °F (36.4 °C) (Axillary)   Resp 14   Ht 1.524 m (5')   Wt 62.2 kg (137 lb 2 oz)   SpO2 98%   BMI 26.78 kg/m²   General: WD/WN, older female laying in bed restrained, pleasant and cooperative w/ exam. in no acute distress, appears chronically ill.  Eyes:  PERRL. Conjunctivae/corneas clear.  HENT: Head normal appearing. Nares normal. Oral mucosa moist.  Hearing intact.   Neck: Supple, with full range of motion. Trachea midline.  No gross JVD appreciated.  Respiratory:  Normal effort. Clear to auscultation, without rales or wheezes or rhonchi.  Cardiovascular: Normal rate, regular rhythm with normal S1/S2 without murmurs.    No lower extremity edema.   Abdomen: Soft, non-tender, non-distended with normal bowel sounds.  Musculoskeletal: No joint swelling or tenderness. Normal tone. No abnormal movements.   Skin: Warm and dry. No rashes or lesions.  Neurologic:  Pt has LUE weakness. Unable to move fingers. Raised L hand. Can squeeze finger and move right arm and both legs and feet.  Psychiatric: Alert, oriented x 2~3. Unclear of location, situation.   Capillary Refill: Brisk,< 3 seconds.  Peripheral Pulses: +2 palpable, equal bilaterally.       Labs/Radiology: See chart or assessment above.       Nikko Chavez MD

## 2024-08-28 NOTE — PALLIATIVE CARE
Follow Up / Progress Note        Patient:   Mila Andrade  YOB: 1950  Age:  74 y.o.  Room:  [facility]/NONE  MRN:  [id number]           Patient currently out of room for JODI. Spoke with Brandy GOMEZ. Per Brandy GOMEZ, patient is more alert today. Plan to have speech re evaluate patient tomorrow. Will plan to follow up with patient/family after speech evaluates patient tomorrow.       Electronically signed by Nadine Flores RN on 8/28/2024 at 1:29 PM             Palliative Care Office: [phone]

## 2024-08-28 NOTE — ANESTHESIA PRE PROCEDURE
Department of Anesthesiology  Preprocedure Note       Name:  Mila Andrade   Age:  74 y.o.  :  1950                                          MRN:  [id number]         Date:  2024      Surgeon: Surgeon(s):  Everton Finley MD    Procedure: Procedure(s):  TRANSESOPHAGEAL ECHOCARDIOGRAM    Medications prior to admission:   Prior to Admission medications    Medication Sig Start Date End Date Taking? Authorizing Provider   amLODIPine (NORVASC) 10 MG tablet Take 1 tablet by mouth daily 23  Saundra Nelson, APRN - FINA   hydroCHLOROthiazide (HYDRODIURIL) 25 MG tablet Take 1 tablet by mouth daily 23   Saundra Nelson APRN - CNP   aspirin 81 MG tablet Take 1 tablet by mouth daily DO NOT TAKE UNTIL YOU ARE ADVISED TO RESTART BY NEUROSURGEONDR. PINO 23   Jennfier Montesinos APRN - CNP   clopidogrel (PLAVIX) 75 MG tablet Take 1 tablet by mouth daily DO NOT TAKE UNTIL YOU ARE ADVISED TO RESTART BY NEUROSURGEONDR. PINO 23   Jennifer Montesinos APRN - CNP   Coenzyme Q10 (COQ10) 100 MG CAPS Take 100 mg by mouth daily    Provider, Historical, MD   Handicap Placard MISC by Does not apply route Expires 23   Gene Griffin DO   acetaminophen 650 MG TABS Take 650 mg by mouth every 4 hours as needed. 11/10/14   Matt Gtz MD       Current medications:    Current Facility-Administered Medications   Medication Dose Route Frequency Provider Last Rate Last Admin    aspirin chewable tablet 81 mg  81 mg Oral Daily Manish Jeffers DO   81 mg at 24 0958    potassium chloride 10 mEq/100 mL IVPB (Peripheral Line)  10 mEq IntraVENous Q1H Nikko Chavez  mL/hr at 24 1021 10 mEq at 24 1021    folic acid 1 mg in sodium chloride 0.9 % 50 mL IVPB  1 mg IntraVENous Daily Nikko Chavez  mL/hr at 24 1026 1 mg at 24 1026    [Held by provider] ciprofloxacin (CIPRO) IVPB 400 mg  400 mg IntraVENous Q12H Manish Jeffers DO   Stopped at 24 141

## 2024-08-28 NOTE — ANESTHESIA POSTPROCEDURE EVALUATION
Department of Anesthesiology  Postprocedure Note    Patient: Mila Andrade  MRN: [id number]  YOB: 1950  Date of evaluation: 8/28/2024    Procedure Summary       Date: 08/28/24 Room / Location: [place] [address] / [facility]    Anesthesia Start: 1255 Anesthesia Stop: 1311    Procedure: TRANSESOPHAGEAL ECHOCARDIOGRAM Diagnosis:       Cerebrovascular accident (CVA), unspecified mechanism (HCC)      (Cerebrovascular accident (CVA), unspecified mechanism (HCC) [I63.9])    Surgeons: Everton Finley MD Responsible Provider: Geremias Ruiz DO    Anesthesia Type: MAC ASA Status: 3            Anesthesia Type: No value filed.    Kirsten Phase I: Kirsten Score: 10    Kirsten Phase II:      Anesthesia Post Evaluation    Patient location during evaluation: PACU  Patient participation: complete - patient participated  Level of consciousness: awake  Pain score: 0  Airway patency: patent  Nausea & Vomiting: no vomiting and no nausea  Cardiovascular status: hemodynamically stable  Respiratory status: spontaneous ventilation and room air  Hydration status: stable        No notable events documented.

## 2024-08-28 NOTE — PLAN OF CARE
Problem: Discharge Planning  Goal: Discharge to home or other facility with appropriate resources  Outcome: Progressing  Flowsheets (Taken 8/28/2024 1649)  Discharge to home or other facility with appropriate resources:   Identify barriers to discharge with patient and caregiver   Arrange for needed discharge resources and transportation as appropriate   Identify discharge learning needs (meds, wound care, etc)   Arrange for interpreters to assist at discharge as needed     Problem: Chronic Conditions and Co-morbidities  Goal: Patient's chronic conditions and co-morbidity symptoms are monitored and maintained or improved  Outcome: Progressing  Flowsheets (Taken 8/28/2024 1649)  Care Plan - Patient's Chronic Conditions and Co-Morbidity Symptoms are Monitored and Maintained or Improved:   Monitor and assess patient's chronic conditions and comorbid symptoms for stability, deterioration, or improvement   Collaborate with multidisciplinary team to address chronic and comorbid conditions and prevent exacerbation or deterioration   Update acute care plan with appropriate goals if chronic or comorbid symptoms are exacerbated and prevent overall improvement and discharge     Problem: Safety - Adult  Goal: Free from fall injury  Outcome: Progressing  Flowsheets (Taken 8/28/2024 1649)  Free From Fall Injury:   Instruct family/caregiver on patient safety   Based on caregiver fall risk screen, instruct family/caregiver to ask for assistance with transferring infant if caregiver noted to have fall risk factors     Problem: Pain  Goal: Verbalizes/displays adequate comfort level or baseline comfort level  Outcome: Progressing  Flowsheets (Taken 8/28/2024 1649)  Verbalizes/displays adequate comfort level or baseline comfort level:   Encourage patient to monitor pain and request assistance   Assess pain using appropriate pain scale   Implement non-pharmacological measures as appropriate and evaluate response   Administer  analgesics based on type and severity of pain and evaluate response     Problem: Skin/Tissue Integrity  Goal: Absence of new skin breakdown  Description: 1.  Monitor for areas of redness and/or skin breakdown  2.  Assess vascular access sites hourly  3.  Every 4-6 hours minimum:  Change oxygen saturation probe site  4.  Every 4-6 hours:  If on nasal continuous positive airway pressure, respiratory therapy assess nares and determine need for appliance change or resting period.  Outcome: Progressing     Problem: Neurosensory - Adult  Goal: Achieves stable or improved neurological status  Outcome: Progressing  Flowsheets (Taken 8/28/2024 1649)  Achieves stable or improved neurological status:   Assess for and report changes in neurological status   Initiate measures to prevent increased intracranial pressure   Maintain blood pressure and fluid volume within ordered parameters to optimize cerebral perfusion and minimize risk of hemorrhage   Monitor temperature, glucose, and sodium. Initiate appropriate interventions as ordered  Goal: Achieves maximal functionality and self care  Outcome: Progressing  Flowsheets (Taken 8/28/2024 1649)  Achieves maximal functionality and self care:   Monitor swallowing and airway patency with patient fatigue and changes in neurological status   Encourage and assist patient to increase activity and self care with guidance from physical therapy/occupational therapy   Encourage visually impaired, hearing impaired and aphasic patients to use assistive/communication devices     Problem: ABCDS Injury Assessment  Goal: Absence of physical injury  Outcome: Progressing  Flowsheets (Taken 8/28/2024 1649)  Absence of Physical Injury: Implement safety measures based on patient assessment     Problem: Confusion  Goal: Confusion, delirium, dementia, or psychosis is improved or at baseline  Description: INTERVENTIONS:  1. Assess for possible contributors to thought disturbance, including medications,

## 2024-08-29 LAB
ANION GAP SERPL CALC-SCNC: 13 MEQ/L (ref 8–16)
BUN SERPL-MCNC: 4 MG/DL (ref 7–22)
CALCIUM SERPL-MCNC: 8.6 MG/DL (ref 8.5–10.5)
CHLORIDE SERPL-SCNC: 103 MEQ/L (ref 98–111)
CO2 SERPL-SCNC: 24 MEQ/L (ref 23–33)
CREAT SERPL-MCNC: 0.5 MG/DL (ref 0.4–1.2)
DEPRECATED RDW RBC AUTO: 58.6 FL (ref 35–45)
ERYTHROCYTE [DISTWIDTH] IN BLOOD BY AUTOMATED COUNT: 24.9 % (ref 11.5–14.5)
GFR SERPL CREATININE-BSD FRML MDRD: > 90 ML/MIN/1.73M2
GLUCOSE SERPL-MCNC: 86 MG/DL (ref 70–108)
HCT VFR BLD AUTO: 34.3 % (ref 37–47)
HGB BLD-MCNC: 9.8 GM/DL (ref 12–16)
MCH RBC QN AUTO: 20.2 PG (ref 26–33)
MCHC RBC AUTO-ENTMCNC: 28.6 GM/DL (ref 32.2–35.5)
MCV RBC AUTO: 70.7 FL (ref 81–99)
PLATELET # BLD AUTO: 139 THOU/MM3 (ref 130–400)
PMV BLD AUTO: ABNORMAL FL (ref 9.4–12.4)
POTASSIUM SERPL-SCNC: 3.5 MEQ/L (ref 3.5–5.2)
RBC # BLD AUTO: 4.85 MILL/MM3 (ref 4.2–5.4)
SCAN OF BLOOD SMEAR: NORMAL
SODIUM SERPL-SCNC: 140 MEQ/L (ref 135–145)
WBC # BLD AUTO: 4.2 THOU/MM3 (ref 4.8–10.8)

## 2024-08-29 PROCEDURE — 6370000000 HC RX 637 (ALT 250 FOR IP): Performed by: STUDENT IN AN ORGANIZED HEALTH CARE EDUCATION/TRAINING PROGRAM

## 2024-08-29 PROCEDURE — 6360000002 HC RX W HCPCS: Performed by: INTERNAL MEDICINE

## 2024-08-29 PROCEDURE — 2580000003 HC RX 258: Performed by: STUDENT IN AN ORGANIZED HEALTH CARE EDUCATION/TRAINING PROGRAM

## 2024-08-29 PROCEDURE — 6360000002 HC RX W HCPCS: Performed by: STUDENT IN AN ORGANIZED HEALTH CARE EDUCATION/TRAINING PROGRAM

## 2024-08-29 PROCEDURE — 80048 BASIC METABOLIC PNL TOTAL CA: CPT

## 2024-08-29 PROCEDURE — 97535 SELF CARE MNGMENT TRAINING: CPT

## 2024-08-29 PROCEDURE — 92526 ORAL FUNCTION THERAPY: CPT

## 2024-08-29 PROCEDURE — 2500000003 HC RX 250 WO HCPCS: Performed by: STUDENT IN AN ORGANIZED HEALTH CARE EDUCATION/TRAINING PROGRAM

## 2024-08-29 PROCEDURE — 85027 COMPLETE CBC AUTOMATED: CPT

## 2024-08-29 PROCEDURE — 36415 COLL VENOUS BLD VENIPUNCTURE: CPT

## 2024-08-29 PROCEDURE — 6360000002 HC RX W HCPCS: Performed by: SPECIALIST

## 2024-08-29 PROCEDURE — 99232 SBSQ HOSP IP/OBS MODERATE 35: CPT | Performed by: STUDENT IN AN ORGANIZED HEALTH CARE EDUCATION/TRAINING PROGRAM

## 2024-08-29 PROCEDURE — 6370000000 HC RX 637 (ALT 250 FOR IP): Performed by: PHYSICIAN ASSISTANT

## 2024-08-29 PROCEDURE — 1200000000 HC SEMI PRIVATE

## 2024-08-29 PROCEDURE — 97530 THERAPEUTIC ACTIVITIES: CPT

## 2024-08-29 RX ORDER — ENOXAPARIN SODIUM 100 MG/ML
40 INJECTION SUBCUTANEOUS EVERY 24 HOURS
Status: DISCONTINUED | OUTPATIENT
Start: 2024-08-29 | End: 2024-09-05 | Stop reason: HOSPADM

## 2024-08-29 RX ADMIN — CEFTRIAXONE SODIUM 1000 MG: 1 INJECTION, POWDER, FOR SOLUTION INTRAMUSCULAR; INTRAVENOUS at 11:14

## 2024-08-29 RX ADMIN — ATORVASTATIN CALCIUM 40 MG: 40 TABLET, FILM COATED ORAL at 19:36

## 2024-08-29 RX ADMIN — FOLIC ACID 1 MG: 5 INJECTION, SOLUTION INTRAMUSCULAR; INTRAVENOUS; SUBCUTANEOUS at 08:35

## 2024-08-29 RX ADMIN — CLOPIDOGREL BISULFATE 75 MG: 75 TABLET ORAL at 08:24

## 2024-08-29 RX ADMIN — Medication 6 MG: at 19:36

## 2024-08-29 RX ADMIN — PANTOPRAZOLE SODIUM 40 MG: 40 INJECTION, POWDER, FOR SOLUTION INTRAVENOUS at 19:36

## 2024-08-29 RX ADMIN — CYANOCOBALAMIN 1000 MCG: 1000 INJECTION, SOLUTION INTRAMUSCULAR; SUBCUTANEOUS at 08:42

## 2024-08-29 RX ADMIN — SODIUM CHLORIDE, PRESERVATIVE FREE 10 ML: 5 INJECTION INTRAVENOUS at 19:37

## 2024-08-29 RX ADMIN — THIAMINE HYDROCHLORIDE 100 MG: 100 INJECTION, SOLUTION INTRAMUSCULAR; INTRAVENOUS at 08:41

## 2024-08-29 RX ADMIN — ENOXAPARIN SODIUM 40 MG: 100 INJECTION SUBCUTANEOUS at 19:35

## 2024-08-29 RX ADMIN — ASPIRIN 81 MG 81 MG: 81 TABLET ORAL at 08:24

## 2024-08-29 RX ADMIN — SODIUM CHLORIDE, PRESERVATIVE FREE 10 ML: 5 INJECTION INTRAVENOUS at 08:45

## 2024-08-29 RX ADMIN — PANTOPRAZOLE SODIUM 40 MG: 40 INJECTION, POWDER, FOR SOLUTION INTRAVENOUS at 08:45

## 2024-08-29 NOTE — CARE COORDINATION
8/29/24, 1:52 PM EDT    DISCHARGE ON GOING EVALUATION    [place] [facility] day: 6  Location: -14/014-A Reason for admit: Confusion [R41.0]  Anemia [D64.9]  Altered mental status, unspecified altered mental status type [R41.82]  Iron deficiency anemia, unspecified iron deficiency anemia type [D50.9]  Cerebrovascular accident (CVA) due to bilateral stenosis of middle cerebral arteries (HCC) [I63.513]     Procedures:   8/23 EEG: This EEG was abnormal due to:  -Diffuse background slowing and disorganization  -Asymmetric right hemispheric slowing  8/24 ECHO: EF 55-60%.   8/28 JODI   Imaging since last note: none    Barriers to Discharge: Pureed diet, PT/OT/SLP, Dietician following, IV Rocephin, IV Protonix, Zofran, electrolyte replacement protocols.  Palliative Care following.     PCP: Saundra Nelson APRN - CNP  Readmission Risk Score: 15.7    Patient Goals/Plan/Treatment Preferences: From home with spouse. SW following for placement needs.

## 2024-08-29 NOTE — PROGRESS NOTES
Comprehensive Nutrition Assessment    Type and Reason for Visit:  Reassess (diet and ONS initiation)    Nutrition Recommendations/Plan:   Recommend start MVI.  Diet initiated today per SLP.  ONS initiated: Magic Cup TID.  If tubefeedings restarted recommend Jevity 1.5 at 10 ml/hr, increase 10 ml/hr every 6 hours as tolerated to goal of 50 ml/hr.  Recommend consider 125 mls free water flush every 6 hours if no IVF's or per Provider.      Malnutrition Assessment:  Malnutrition Status:  Moderate malnutrition (08/26/24 1337)    Context:  Chronic Illness     Findings of the 6 clinical characteristics of malnutrition:  Energy Intake:  75% or less estimated energy requirements for 1 month or longer  Weight Loss:  No significant weight loss     Body Fat Loss:  Mild body fat loss Orbital   Muscle Mass Loss:  Mild muscle mass loss Temples (temporalis), Clavicles (pectoralis & deltoids), Hand (interosseous)  Fluid Accumulation:  No significant fluid accumulation     Strength:  Not Performed    Nutrition Assessment:     Pt improving from a nutritional standpoint AEB diet initiation today per SLP.  Remains at risk for further nutritional compromise r/t moderate malnutrition, dysphagia, previous NPO status with NGT feedings (patient pulled out NGT 8/26/24-reinserted but then came out 8/28/24 during JODI), encephalopathy, multiple infarcts, acute anemia, leukopenia, multiple uterine masses, and underlying medical condition (hx: CVA, AAA, alcohol use (4 bud lights-5 times per day).       Nutrition Related Findings:    Pt. Report/Treatments/Miscellaneous: Patient and spouse seen earlier this morning.  Patient had pulled NGT out 8/26/24, was re-inserted and then came out during JODI 8/28/24.  SLP was planning to evaluate swallowing upon writer exiting room-spoke with SLP after-diet initiated concerns about ability to maintain adequate nutrition/hydration discussed.    GI Status: BM 8/28/24  Pertinent Labs: 8/29/24: BMP  Biochemical Data, Chewing or Swallowing, GI Status, Fluid Status or Edema, Nutrition Focused Physical Findings, Weight    Discharge Planning:    Too soon to determine     Allison C Schwab, RD, LD  Contact: [phone]

## 2024-08-29 NOTE — PROGRESS NOTES
[facility]  PHYSICAL THERAPY MISSED TREATMENT NOTE  [facility] NEUROSCIENCES 4A    Date: 2024  Patient Name: Mila Andrade        MRN: [id number]   : 1950  (74 y.o.)  Gender: female   Referring Practitioner: Geremias Thomas DO  Diagnosis: Confusion         REASON FOR MISSED TREATMENT:  OT was in working w/ pt then attempted and ST in w/ pt. Will check back next available date .

## 2024-08-29 NOTE — PALLIATIVE CARE
Follow Up / Progress Note        Patient:   Mila Andrade  YOB: 1950  Age:  74 y.o.  Room:  [address] [place] [address]  MRN:  [id number]           Per chart review. Patient cleared for diet.     Family/Patient Discussion:  in room to speak with patient/. Patient resting in bed. When asking patient questions, patient would not answer. Asked patient if she was doing okay. Patient would not answer. Asked question 3x. Patient eventually shook head \"yes\". Asked patient if this RN could speak with her , patient did not respond. Discussed current plan of care with patient's , Anil. Discussed SLP james. Anil stated he understood patient passed, but will still be very high risk. Discussed patient most likely not being able to take in adequate calories. Informed Anil that the care team is going to be limited on treatment if patient is unable to eat/drink properly. Anil states he understands. Anil stated at this time his hope is to get patient to a SNF to see how she does with rehab. Anil is okay with current treatment plan, denied further questions at this time.       Plan/Follow-Up:  continue current treatment plan at this time. Will continue to follow while admitted. Please call palliative care with immediate concerns.          Electronically signed by Nadine Flores RN on 8/29/2024 at 3:00 PM             Palliative Care Office: [phone]

## 2024-08-29 NOTE — CARE COORDINATION
8/29/24, 9:03 AM EDT    DISCHARGE PLANNING EVALUATION    Left a message for Anastacio at [facility].  Waiting to hear if they can accept at discharge.      Update 1:52 pm: [facility] has denied.  Called and made a referral to Iesha Turner.  Updated spouse.

## 2024-08-29 NOTE — PLAN OF CARE
Problem: Discharge Planning  Goal: Discharge to home or other facility with appropriate resources  8/29/2024 1129 by Rajan Ford RN  Outcome: Progressing  Flowsheets (Taken 8/29/2024 0329 by Kimmy Story, RN)  Discharge to home or other facility with appropriate resources:   Identify barriers to discharge with patient and caregiver   Arrange for needed discharge resources and transportation as appropriate     Problem: Chronic Conditions and Co-morbidities  Goal: Patient's chronic conditions and co-morbidity symptoms are monitored and maintained or improved  8/29/2024 1129 by Rajan Ford RN  Outcome: Progressing  Flowsheets (Taken 8/29/2024 0329 by Kimmy Story, RN)  Care Plan - Patient's Chronic Conditions and Co-Morbidity Symptoms are Monitored and Maintained or Improved:   Collaborate with multidisciplinary team to address chronic and comorbid conditions and prevent exacerbation or deterioration   Monitor and assess patient's chronic conditions and comorbid symptoms for stability, deterioration, or improvement   Update acute care plan with appropriate goals if chronic or comorbid symptoms are exacerbated and prevent overall improvement and discharge     Problem: Safety - Adult  Goal: Free from fall injury  8/29/2024 1129 by Rajan Ford RN  Outcome: Progressing  Flowsheets (Taken 8/29/2024 0329 by Kimmy Story, RN)  Free From Fall Injury:   Instruct family/caregiver on patient safety   Based on caregiver fall risk screen, instruct family/caregiver to ask for assistance with transferring infant if caregiver noted to have fall risk factors     Problem: Pain  Goal: Verbalizes/displays adequate comfort level or baseline comfort level  8/29/2024 1129 by Rajan Ford RN  Outcome: Progressing  Flowsheets (Taken 8/29/2024 0329 by Kimmy Story, RN)  Verbalizes/displays adequate comfort level or baseline comfort level:   Encourage patient to monitor  pain and request assistance   Assess pain using appropriate pain scale   Administer analgesics based on type and severity of pain and evaluate response   Implement non-pharmacological measures as appropriate and evaluate response   Consider cultural and social influences on pain and pain management   Notify Licensed Independent Practitioner if interventions unsuccessful or patient reports new pain     Problem: Skin/Tissue Integrity  Goal: Absence of new skin breakdown  Description: 1.  Monitor for areas of redness and/or skin breakdown  2.  Assess vascular access sites hourly  3.  Every 4-6 hours minimum:  Change oxygen saturation probe site  4.  Every 4-6 hours:  If on nasal continuous positive airway pressure, respiratory therapy assess nares and determine need for appliance change or resting period.  8/29/2024 1129 by Rajan Ford, RN  Outcome: Progressing  Note: No signs of new skin breakdown.  Skin warm, dry, and intact.  Mucous membranes pink and moist.  Assistance with turns/ambulation provided PRN.  Will continue to monitor.       Problem: Neurosensory - Adult  Goal: Achieves stable or improved neurological status  8/29/2024 1129 by Rajan Ford RN  Outcome: Progressing     Problem: Neurosensory - Adult  Goal: Achieves maximal functionality and self care  8/29/2024 1129 by Rajan Ford RN  Outcome: Progressing  Flowsheets (Taken 8/29/2024 0329 by Kimmy Story RN)  Achieves maximal functionality and self care:   Monitor swallowing and airway patency with patient fatigue and changes in neurological status   Encourage and assist patient to increase activity and self care with guidance from physical therapy/occupational therapy   Encourage visually impaired, hearing impaired and aphasic patients to use assistive/communication devices     Problem: ABCDS Injury Assessment  Goal: Absence of physical injury  8/29/2024 1129 by Rajan Ford, JASON  Outcome:

## 2024-08-29 NOTE — PROGRESS NOTES
[facility]  STR NEUROSCIENCES 4A  Occupational Therapy  Daily Note    Discharge Recommendations: Subacute/skilled nursing facility, Not safe to return home at this time, Patient would benefit from continued OT at discharge, and Inpatient Therapy Stay  Equipment Recommendations: Equipment Needed: No  Other: continue to monitor, will likely require some AD dependent on abilities      Time In: 1420  Time Out: 1453  Timed Code Treatment Minutes: 33 Minutes  Minutes: 33          Date: 2024  Patient Name: Mila Andrade,   Gender: female      Room: [facility]14/014-A  MRN: [id number]  : 1950  (74 y.o.)  Referring Practitioner: Geremias Thomas DO  Diagnosis: Confusion  Additional Pertinent Hx: Patient is a very pleasant 74-year-old female with medical history of prior stroke from home.  The patient has garbled speech is unable to contribute much to the history thus history is obtained from the patient's  is present at bedside.  He states that she was in her normal state of health approxi-9 PM return to bed last night.  He states this morning, she has been confused and in a \"daze\".  He states that she walked in the kitchen to make coffee, and then forgot everything that she was doing.  He states that she then went back to the kitchen made her coughing came back without the coffee.  As a attempt to talk to her, she has staring episodes and does not converse very much.     Upon my examination, the patient is mostly nonverbal.  She is able to answer few simple questions with garbled speech.  The  relates that the patient has been taking aspirin daily, but stopped taking all of her other medications including Lipitor approximate 6 months prior.     While in the ED, the patient was noted to have a hemoglobin of 7 down from approximate 11.6 approximately 1 year earlier.  Patient has never had a colonoscopy per the .  They have noticed no bleeding. - Per neurology note, \"Multiple infarcts

## 2024-08-29 NOTE — PROGRESS NOTES
[facility]  INPATIENT SPEECH THERAPY  [facility] NEUROSCIENCES 4A  DAILY NOTE    TIME   SLP Individual Minutes  Time In: 0945  Time Out: 1015  Minutes: 30  Timed Code Treatment Minutes: 0 Minutes       Date: 2024  Patient Name: Mila Andrade      CSN: [id number]   : 1950  (74 y.o.)  Gender: female   Referring Physician:  Geremias Thomas DO   Diagnosis: Confusion   Precautions: Fall risk, aspiration precautions   Current Diet: Puree, thin liquids   Respiratory Status: Room Air  Swallowing Strategies: Standard Universal Swallow Precautions  Date of Last MBS/FEES: Not Applicable    Pain:  No pain reported.    Subjective:  Spoke with JASON Gerber for approval of ST interventions. Upon arrival, patient laying in bed. Alert and cooperative. Minimal verbalizations present this date.  present at bedside.     Short-Term Goals:  SHORT TERM GOAL #1:  Goal 1: Patient will consume conservative oral offerings (ice chips, thin liquid, puree), demonstrating consistency of pharyngeal trigger and adequate alertness/endurance measures to determine readiness for an instrumental evaluation of swallow function. GOAL MET, NEW GOAL 1: Patient will consume puree diet (advanced texture trials with ST) and thin liquids with stable pulmonary status and use of trained compensatory strategies to assist with nutrition/hydration.   INTERVENTIONS: Patient consumed PO trials of ice chips, thin liquids, and pudding with ST this date. Patient alert throughout entirety of session. Minimal cues required for oral initiation and patient to accept PO trials from spoon. Oral bolus holding ranging from 10-35 second evident. Consistent pharyngeal trigger achieved, however presence of audible swallow. Highly suspect presence of delayed swallow initiation and premature spillage into the pharynx. No overt s/s of aspiration present throughout evaluation; certainly cannot rule out pharyngeal dysfunction at bedside alone. Patient remains  Related to Stroke Diagnosis  Evaluation of Education: Verbalizes understanding and Needs further instruction    ASSESSMENT/PLAN:  Activity Tolerance:  Patient tolerance of  treatment: good.      Assessment/Plan: Patient progressing toward established goals.  Continues to require skilled care of licensed speech pathologist to progress toward achievement of established goals and plan of care..     Plan for Next Session: dysphagia management.  Discharge Recommendations: [facility]     Keily Humphreys M.A., CCC-SLP [id number]

## 2024-08-29 NOTE — PROGRESS NOTES
Pt arrived to 6a 7 from 4a.  at bedside. Pt alert and oriented x1. Respirations even and unlabored. Stand pivot from wheelchair into bed. Bed in low position call light within reach bed alarm on gripper socks in place, care board updated.

## 2024-08-29 NOTE — PLAN OF CARE
Problem: Discharge Planning  Goal: Discharge to home or other facility with appropriate resources  8/29/2024 0329 by Kimmy Story RN  Outcome: Progressing  Flowsheets (Taken 8/29/2024 0329)  Discharge to home or other facility with appropriate resources:   Identify barriers to discharge with patient and caregiver   Arrange for needed discharge resources and transportation as appropriate     Problem: Chronic Conditions and Co-morbidities  Goal: Patient's chronic conditions and co-morbidity symptoms are monitored and maintained or improved  8/29/2024 0329 by Kimmy Stoyr RN  Outcome: Progressing  Flowsheets (Taken 8/29/2024 0329)  Care Plan - Patient's Chronic Conditions and Co-Morbidity Symptoms are Monitored and Maintained or Improved:   Collaborate with multidisciplinary team to address chronic and comorbid conditions and prevent exacerbation or deterioration   Monitor and assess patient's chronic conditions and comorbid symptoms for stability, deterioration, or improvement   Update acute care plan with appropriate goals if chronic or comorbid symptoms are exacerbated and prevent overall improvement and discharge     Problem: Safety - Adult  Goal: Free from fall injury  8/29/2024 0329 by Kimmy Story RN  Outcome: Progressing  Flowsheets (Taken 8/29/2024 0329)  Free From Fall Injury:   Instruct family/caregiver on patient safety   Based on caregiver fall risk screen, instruct family/caregiver to ask for assistance with transferring infant if caregiver noted to have fall risk factors     Problem: Pain  Goal: Verbalizes/displays adequate comfort level or baseline comfort level  8/29/2024 0329 by Kimmy Story RN  Outcome: Progressing  Flowsheets (Taken 8/29/2024 0329)  Verbalizes/displays adequate comfort level or baseline comfort level:   Encourage patient to monitor pain and request assistance   Assess pain using appropriate pain scale   Administer analgesics based on type and severity of pain and  from Restraints (Restraint for Violent/Self-destructive Behavior): Determine that de-escalation and other, less restrictive measures have been tried or would not be effective before applying the restraint

## 2024-08-30 LAB
ANION GAP SERPL CALC-SCNC: 9 MEQ/L (ref 8–16)
BUN SERPL-MCNC: 10 MG/DL (ref 7–22)
CA-I BLD ISE-SCNC: 0.93 MMOL/L (ref 1.12–1.32)
CALCIUM SERPL-MCNC: 8.2 MG/DL (ref 8.5–10.5)
CHLORIDE SERPL-SCNC: 104 MEQ/L (ref 98–111)
CO2 SERPL-SCNC: 27 MEQ/L (ref 23–33)
CREAT SERPL-MCNC: 0.6 MG/DL (ref 0.4–1.2)
DEPRECATED RDW RBC AUTO: 59.9 FL (ref 35–45)
ERYTHROCYTE [DISTWIDTH] IN BLOOD BY AUTOMATED COUNT: 25.7 % (ref 11.5–14.5)
GFR SERPL CREATININE-BSD FRML MDRD: > 90 ML/MIN/1.73M2
GLUCOSE SERPL-MCNC: 97 MG/DL (ref 70–108)
HCT VFR BLD AUTO: 33.4 % (ref 37–47)
HGB BLD-MCNC: 9.3 GM/DL (ref 12–16)
MCH RBC QN AUTO: 19.7 PG (ref 26–33)
MCHC RBC AUTO-ENTMCNC: 27.8 GM/DL (ref 32.2–35.5)
MCV RBC AUTO: 70.9 FL (ref 81–99)
PLATELET # BLD AUTO: 142 THOU/MM3 (ref 130–400)
PMV BLD AUTO: ABNORMAL FL (ref 9.4–12.4)
POTASSIUM SERPL-SCNC: 3.5 MEQ/L (ref 3.5–5.2)
RBC # BLD AUTO: 4.71 MILL/MM3 (ref 4.2–5.4)
SODIUM SERPL-SCNC: 140 MEQ/L (ref 135–145)
WBC # BLD AUTO: 4.5 THOU/MM3 (ref 4.8–10.8)

## 2024-08-30 PROCEDURE — 1200000000 HC SEMI PRIVATE

## 2024-08-30 PROCEDURE — 2580000003 HC RX 258: Performed by: STUDENT IN AN ORGANIZED HEALTH CARE EDUCATION/TRAINING PROGRAM

## 2024-08-30 PROCEDURE — 97116 GAIT TRAINING THERAPY: CPT

## 2024-08-30 PROCEDURE — 2500000003 HC RX 250 WO HCPCS: Performed by: STUDENT IN AN ORGANIZED HEALTH CARE EDUCATION/TRAINING PROGRAM

## 2024-08-30 PROCEDURE — 97535 SELF CARE MNGMENT TRAINING: CPT

## 2024-08-30 PROCEDURE — 99232 SBSQ HOSP IP/OBS MODERATE 35: CPT | Performed by: STUDENT IN AN ORGANIZED HEALTH CARE EDUCATION/TRAINING PROGRAM

## 2024-08-30 PROCEDURE — 6370000000 HC RX 637 (ALT 250 FOR IP): Performed by: PHYSICIAN ASSISTANT

## 2024-08-30 PROCEDURE — 2580000003 HC RX 258: Performed by: INTERNAL MEDICINE

## 2024-08-30 PROCEDURE — 6370000000 HC RX 637 (ALT 250 FOR IP): Performed by: STUDENT IN AN ORGANIZED HEALTH CARE EDUCATION/TRAINING PROGRAM

## 2024-08-30 PROCEDURE — 36415 COLL VENOUS BLD VENIPUNCTURE: CPT

## 2024-08-30 PROCEDURE — 97530 THERAPEUTIC ACTIVITIES: CPT

## 2024-08-30 PROCEDURE — 6360000002 HC RX W HCPCS: Performed by: STUDENT IN AN ORGANIZED HEALTH CARE EDUCATION/TRAINING PROGRAM

## 2024-08-30 PROCEDURE — 97112 NEUROMUSCULAR REEDUCATION: CPT

## 2024-08-30 PROCEDURE — 6360000002 HC RX W HCPCS: Performed by: SPECIALIST

## 2024-08-30 PROCEDURE — 80048 BASIC METABOLIC PNL TOTAL CA: CPT

## 2024-08-30 PROCEDURE — 6360000002 HC RX W HCPCS: Performed by: INTERNAL MEDICINE

## 2024-08-30 PROCEDURE — 85027 COMPLETE CBC AUTOMATED: CPT

## 2024-08-30 PROCEDURE — 82330 ASSAY OF CALCIUM: CPT

## 2024-08-30 RX ADMIN — CYANOCOBALAMIN 1000 MCG: 1000 INJECTION, SOLUTION INTRAMUSCULAR; SUBCUTANEOUS at 09:42

## 2024-08-30 RX ADMIN — PANTOPRAZOLE SODIUM 40 MG: 40 INJECTION, POWDER, FOR SOLUTION INTRAVENOUS at 19:26

## 2024-08-30 RX ADMIN — ATORVASTATIN CALCIUM 40 MG: 40 TABLET, FILM COATED ORAL at 19:26

## 2024-08-30 RX ADMIN — FOLIC ACID 1 MG: 5 INJECTION, SOLUTION INTRAMUSCULAR; INTRAVENOUS; SUBCUTANEOUS at 09:43

## 2024-08-30 RX ADMIN — Medication 6 MG: at 19:26

## 2024-08-30 RX ADMIN — CLOPIDOGREL BISULFATE 75 MG: 75 TABLET ORAL at 09:42

## 2024-08-30 RX ADMIN — SODIUM CHLORIDE, POTASSIUM CHLORIDE, SODIUM LACTATE AND CALCIUM CHLORIDE: 600; 310; 30; 20 INJECTION, SOLUTION INTRAVENOUS at 07:20

## 2024-08-30 RX ADMIN — PANTOPRAZOLE SODIUM 40 MG: 40 INJECTION, POWDER, FOR SOLUTION INTRAVENOUS at 09:42

## 2024-08-30 RX ADMIN — ENOXAPARIN SODIUM 40 MG: 100 INJECTION SUBCUTANEOUS at 19:30

## 2024-08-30 RX ADMIN — ASPIRIN 81 MG 81 MG: 81 TABLET ORAL at 09:42

## 2024-08-30 RX ADMIN — THIAMINE HYDROCHLORIDE 100 MG: 100 INJECTION, SOLUTION INTRAMUSCULAR; INTRAVENOUS at 09:42

## 2024-08-30 RX ADMIN — AMLODIPINE BESYLATE 10 MG: 10 TABLET ORAL at 09:42

## 2024-08-30 RX ADMIN — CEFTRIAXONE SODIUM 1000 MG: 1 INJECTION, POWDER, FOR SOLUTION INTRAMUSCULAR; INTRAVENOUS at 11:26

## 2024-08-30 NOTE — CARE COORDINATION
8/30/24, 7:18 AM EDT    DISCHARGE BARRIERS        Patient transferred to 6A-07. Report given to unit SW, Santa, regarding discharge plan for this patient.

## 2024-08-30 NOTE — PLAN OF CARE
Problem: Discharge Planning  Goal: Discharge to home or other facility with appropriate resources  8/29/2024 2106 by Aruna Ball, RN  Outcome: Progressing  Flowsheets (Taken 8/29/2024 2106)  Discharge to home or other facility with appropriate resources:   Identify barriers to discharge with patient and caregiver   Identify discharge learning needs (meds, wound care, etc)   Refer to discharge planning if patient needs post-hospital services based on physician order or complex needs related to functional status, cognitive ability or social support system   Arrange for needed discharge resources and transportation as appropriate     Problem: Chronic Conditions and Co-morbidities  Goal: Patient's chronic conditions and co-morbidity symptoms are monitored and maintained or improved  8/29/2024 2106 by Aruna Ball, RN  Outcome: Progressing  Flowsheets (Taken 8/29/2024 2106)  Care Plan - Patient's Chronic Conditions and Co-Morbidity Symptoms are Monitored and Maintained or Improved:   Monitor and assess patient's chronic conditions and comorbid symptoms for stability, deterioration, or improvement   Collaborate with multidisciplinary team to address chronic and comorbid conditions and prevent exacerbation or deterioration   Update acute care plan with appropriate goals if chronic or comorbid symptoms are exacerbated and prevent overall improvement and discharge     Problem: Safety - Adult  Goal: Free from fall injury  8/29/2024 2106 by Aruna Ball, RN  Outcome: Progressing  Flowsheets (Taken 8/29/2024 2106)  Free From Fall Injury:   Instruct family/caregiver on patient safety   Based on caregiver fall risk screen, instruct family/caregiver to ask for assistance with transferring infant if caregiver noted to have fall risk factors     Problem: Pain  Goal: Verbalizes/displays adequate comfort level or baseline comfort level  8/29/2024 2106 by Aruna Ball, RN  Outcome: Progressing  Flowsheets (Taken  8/29/2024 2106)  Verbalizes/displays adequate comfort level or baseline comfort level:   Encourage patient to monitor pain and request assistance   Administer analgesics based on type and severity of pain and evaluate response   Implement non-pharmacological measures as appropriate and evaluate response   Assess pain using appropriate pain scale     Problem: Skin/Tissue Integrity  Goal: Absence of new skin breakdown  Description: 1.  Monitor for areas of redness and/or skin breakdown  2.  Assess vascular access sites hourly  3.  Every 4-6 hours minimum:  Change oxygen saturation probe site  4.  Every 4-6 hours:  If on nasal continuous positive airway pressure, respiratory therapy assess nares and determine need for appliance change or resting period.  8/29/2024 2106 by Aruna Ball, RN  Outcome: Progressing     Problem: Neurosensory - Adult  Goal: Achieves stable or improved neurological status  8/29/2024 2106 by Aruna Ball, RN  Outcome: Progressing  Flowsheets (Taken 8/29/2024 2106)  Achieves stable or improved neurological status:   Initiate measures to prevent increased intracranial pressure   Assess for and report changes in neurological status     Problem: Neurosensory - Adult  Goal: Achieves maximal functionality and self care  8/29/2024 2106 by Aruna Ball, RN  Outcome: Progressing  Flowsheets (Taken 8/29/2024 2106)  Achieves maximal functionality and self care:   Monitor swallowing and airway patency with patient fatigue and changes in neurological status   Encourage and assist patient to increase activity and self care with guidance from physical therapy/occupational therapy     Problem: ABCDS Injury Assessment  Goal: Absence of physical injury  8/29/2024 2106 by Aruna Ball, RN  Outcome: Progressing  Flowsheets (Taken 8/29/2024 2106)  Absence of Physical Injury: Implement safety measures based on patient assessment     Problem: Confusion  Goal: Confusion, delirium, dementia, or psychosis is

## 2024-08-30 NOTE — PROGRESS NOTES
Hospitalist Progress Note  Internal Medicine Resident      Patient: Mila Andrade 74 y.o. female      Unit/Bed: 6A-07/007-A    Admit Date: 8/23/2024      ASSESSMENT AND PLAN  Active Problems  Multiple infarcts involving the right frontal lobe right corona radiata right parietal lobe and punctate infarcts in both cerebellar hemisphere:JODI showed no Septal malformations or shunts. No mention of thrombi.   -Continue speech.   -Due to pt noncompliance her goals of care are in question. Have consulted Palliative care.     Encephalopathy: likely 2/2 above recent stroke-Improving: Pt significantly more awake and alert today. Pt had swallow study yesterday. Performed well enough to be placed on puree diet. Concern for not being able to intake enough nutrition to meet needs.   -Continue PT/OT/SLP  -Delirium precautions.     Acute anemia and shows iron deficiency: No evidence of bleed at this time. LDH normal. Retic count is low. Likely a bone marrow problem. With low iron may be a resource problem. Pt had Hgb 6.7 on admit. Received 1 unit. Was stable around 7.2. Dropped to 6.5 got another unit. Now 9.6. Hematology saw on 8/26. Stated pt has severe iron deficiency anemia. Started on IV iron.   -Received 3 days Ferrilecit   -b12 qd x5 days (day 4/5)  -Hemoccult stools are pending  -eventual GI workup for Iron def  -On protonix 40 bid.     Leukopenia (stable): Vitamin B12 and folate level are normal. Severe iron deficiency noted  -Hematology is following.   -Completed 4 days of B12 shots  -Completed 3 days Ferrlicit.     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 Seems largely unchanged. Will consider Pelvic us pending clinical course.     Moderate malnutrition: Seeing dietician. NG is no longer in. Dietician recommends MVI. Magic cup. Restarting Tube feeds.   -Will add MVI  -Will discuss tube feeds with family tomorrow but don't anticipate it to go well.     Resolved Problems    Chronic Conditions (reviewed and stable

## 2024-08-30 NOTE — PLAN OF CARE
Problem: Discharge Planning  Goal: Discharge to home or other facility with appropriate resources  Outcome: Progressing  Flowsheets (Taken 8/29/2024 2106 by Aruna Ball, RN)  Discharge to home or other facility with appropriate resources:   Identify barriers to discharge with patient and caregiver   Identify discharge learning needs (meds, wound care, etc)   Refer to discharge planning if patient needs post-hospital services based on physician order or complex needs related to functional status, cognitive ability or social support system   Arrange for needed discharge resources and transportation as appropriate     Problem: Chronic Conditions and Co-morbidities  Goal: Patient's chronic conditions and co-morbidity symptoms are monitored and maintained or improved  Outcome: Progressing  Flowsheets (Taken 8/29/2024 2106 by Aruna Ball, RN)  Care Plan - Patient's Chronic Conditions and Co-Morbidity Symptoms are Monitored and Maintained or Improved:   Monitor and assess patient's chronic conditions and comorbid symptoms for stability, deterioration, or improvement   Collaborate with multidisciplinary team to address chronic and comorbid conditions and prevent exacerbation or deterioration   Update acute care plan with appropriate goals if chronic or comorbid symptoms are exacerbated and prevent overall improvement and discharge     Problem: Safety - Adult  Goal: Free from fall injury  Outcome: Progressing  Flowsheets (Taken 8/29/2024 2106 by Aruna Ball, RN)  Free From Fall Injury:   Instruct family/caregiver on patient safety   Based on caregiver fall risk screen, instruct family/caregiver to ask for assistance with transferring infant if caregiver noted to have fall risk factors     Problem: Pain  Goal: Verbalizes/displays adequate comfort level or baseline comfort level  Outcome: Progressing  Flowsheets (Taken 8/29/2024 2106 by Aruna Ball, RN)  Verbalizes/displays adequate comfort level or baseline  appropriate for improving, restoring, or maintaining nutritional needs:   Assess nutritional status and recommend course of action   Monitor oral intake, labs, and treatment plans   Recommend appropriate diets, oral nutritional supplements, and vitamin/mineral supplements  8/30/2024 1431 by Yue Elias RD, LD  Outcome: Progressing  Flowsheets (Taken 8/30/2024 1431)  Nutrient intake appropriate for improving, restoring, or maintaining nutritional needs:   Assess nutritional status and recommend course of action   Monitor oral intake, labs, and treatment plans   Recommend appropriate diets, oral nutritional supplements, and vitamin/mineral supplements     Problem: Safety - Medical Restraint  Goal: Remains free of injury from restraints (Restraint for Interference with Medical Device)  Description: INTERVENTIONS:  1. Determine that other, less restrictive measures have been tried or would not be effective before applying the restraint  2. Evaluate the patient's condition at the time of restraint application  3. Inform patient/family regarding the reason for restraint  4. Q2H: Monitor safety, psychosocial status, comfort, nutrition and hydration  Outcome: Progressing  Flowsheets (Taken 8/29/2024 0329 by Kimmy Story RN)  Remains free of injury from restraints (restraint for interference with medical device): Determine that other, less restrictive measures have been tried or would not be effective before applying the restraint     Problem: Safety - Violent/Self-destructive Restraint  Goal: Remains free of injury from restraints (Restraint for Violent/Self-Destructive Behavior)  Description: INTERVENTIONS:  1. Determine that de-escalation and other, less restrictive measures have been tried or would not be effective before applying the restraint  2. Identify and document the criteria for restraint  3. Evaluate the patient's condition at the time of restraint application  4. Inform patient/family regarding the

## 2024-08-30 NOTE — PROGRESS NOTES
Comprehensive Nutrition Assessment    Type and Reason for Visit:  Reassess (diet and ONS initiation)    Nutrition Recommendations/Plan:   Continue current diet per SLP/provider  Continue Magic Cup TID.  Monitor oral intake, labs, & wt.      Malnutrition Assessment:  Malnutrition Status:  Moderate malnutrition (08/26/24 1337)    Context:  Chronic Illness     Findings of the 6 clinical characteristics of malnutrition:  Energy Intake:  75% or less estimated energy requirements for 1 month or longer  Weight Loss:  No significant weight loss     Body Fat Loss:  Mild body fat loss Orbital   Muscle Mass Loss:  Mild muscle mass loss Temples (temporalis), Clavicles (pectoralis & deltoids), Hand (interosseous)  Fluid Accumulation:  No significant fluid accumulation     Strength:  Not Performed    Nutrition Assessment:     Pt improving from a nutritional standpoint AEB po % per cht. Remains at risk for further nutritional compromise r/t moderate malnutrition, dysphagia, previous NPO status with NGT feedings (patient pulled out NGT 8/26/24-reinserted but then came out 8/28/24 during JODI), encephalopathy, multiple infarcts, acute anemia, leukopenia, multiple uterine masses, and underlying medical condition (hx: CVA, AAA, alcohol use (4 bud lights-5 times per day)     Nutrition Related Findings:    Pt. Report/Treatments/Miscellaneous: Pt seen, appears limited, appears tearful,  here & he mentions pt ate all of her soup, peaches & magic cup at lunch. He reports she likes  magic cups  consumes ~ 2 per day. Pt denies chewing/swallowing difficulty on current diet. Pt has a telesitter.   GI Status: BM x 1 (8/30)  Pertinent Labs: (8/30) Ca 8.2, Hemoglobin 9.3  Pertinent Meds: BM x 1 (8/30)     Wound Type: None       Current Nutrition Intake & Therapies:    Average Meal Intake: %  Average Supplements Intake: 51-75% (per )  ADULT DIET; Dysphagia - Pureed  ADULT ORAL NUTRITION SUPPLEMENT; Breakfast, Lunch,

## 2024-08-30 NOTE — PROGRESS NOTES
Hospitalist Progress Note  Internal Medicine Resident      Patient: Mila Andrade 74 y.o. female      Unit/Bed: 6A-07/007-A    Admit Date: 8/23/2024      ASSESSMENT AND PLAN  Active Problems  Multiple infarcts involving the right frontal lobe right corona radiata right parietal lobe and punctate infarcts in both cerebellar hemisphere:JODI showed no Septal malformations or shunts. No mention of thrombi.   -Continue speech.   -Due to pt noncompliance her goals of care are in question. Have consulted Palliative care.     Encephalopathy: likely 2/2 above recent stroke-Improving: Concern for not being able to intake enough nutrition to meet needs.   -Continue PT/OT/SLP  -Delirium precautions.     Acute anemia and shows iron deficiency: No evidence of bleed at this time. LDH normal. Retic count is low. Likely a bone marrow problem. With low iron may be a resource problem. Pt had Hgb 6.7 on admit. Received 1 unit. Was stable around 7.2. Dropped to 6.5 got another unit. Now 9.6. Hematology saw on 8/26. Stated pt has severe iron deficiency anemia. Started on IV iron.   -Received 3 days Ferrilecit and 5 days of B12 injection.   -Hemoccult stools are pending  -eventual GI workup for Iron def  -On protonix 40 bid.     Leukopenia (stable): Vitamin B12 and folate level are normal. Severe iron deficiency noted  -Hematology is following.   -Completed 5 days of B12 shots & 3 days Ferrlicit.     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 Seems largely unchanged. Will consider Pelvic us pending clinical course.     Moderate malnutrition: Seeing dietician. NG is no longer in. Dietician recommends MVI. Magic cup. Restarting Tube feeds.   -MVI qd    Resolved Problems    Chronic Conditions (reviewed and stable unless otherwise stated)        LDA: []CVC / []PICC / []Midline / []Huffman / []Drains / []Mediport / [x]None  Antibiotics: No  Steroids: No  Labs (still needed?): [x]Yes / []No  IVF (still needed?): [x]Yes / []No    Level of

## 2024-08-30 NOTE — CARE COORDINATION
8/30/24, 9:17 AM EDT    DISCHARGE PLANNING EVALUATION    Spoke with [facility] Turner admissions, facility will consider if patient tolerates diet and it is documented that she will get enough nutrition with this diet.  Will also need to be certain that patient will not continue to need sitter or restraints, which patient does not have at this time.  Will start precert after these things are determined.

## 2024-08-30 NOTE — PROGRESS NOTES
[place]  STRZ 6A PEDI/MED SURG  Occupational Therapy  Daily Note    Discharge Recommendations: Subacute/skilled nursing facility and Not safe to return home at this time  Equipment Recommendations: Equipment Needed: No  Other: continue to monitor, will likely require some AD dependent on abilities      Time In: 1001  Time Out: 1039  Timed Code Treatment Minutes: 38 Minutes  Minutes: 38          Date: 2024  Patient Name: Mila Andrade,   Gender: female      Room: [facility]  MRN: [id number]  : 1950  (74 y.o.)  Referring Practitioner: Geremias Thomas DO  Diagnosis: Confusion  Additional Pertinent Hx: Patient is a very pleasant 74-year-old female with medical history of prior stroke from home.  The patient has garbled speech is unable to contribute much to the history thus history is obtained from the patient's  is present at bedside.  He states that she was in her normal state of health approxi-9 PM return to bed last night.  He states this morning, she has been confused and in a \"daze\".  He states that she walked in the kitchen to make coffee, and then forgot everything that she was doing.  He states that she then went back to the kitchen made her coughing came back without the coffee.  As a attempt to talk to her, she has staring episodes and does not converse very much.     Upon my examination, the patient is mostly nonverbal.  She is able to answer few simple questions with garbled speech.  The  relates that the patient has been taking aspirin daily, but stopped taking all of her other medications including Lipitor approximate 6 months prior.     While in the ED, the patient was noted to have a hemoglobin of 7 down from approximate 11.6 approximately 1 year earlier.  Patient has never had a colonoscopy per the .  They have noticed no bleeding. - Per neurology note, \"Multiple infarcts involving the right frontal lobe right corona radiata right parietal lobe and

## 2024-08-30 NOTE — PROGRESS NOTES
[facility]  INPATIENT PHYSICAL THERAPY  DAILY NOTE  STRZ 6A PEDI/MED SURG - 6A-07/007-A      Discharge Recommendations: Subacte/Skilled Nursing Facility  Equipment Recommendations:Other: monitor for needs        Time In: 731  Time Out: 811  Timed Code Treatment Minutes: 40 Minutes  Minutes: 40          Date: 2024  Patient Name: Mila Andrade,  Gender:  female        MRN: [id number]  : 1950  (74 y.o.)     Referring Practitioner: Geremias Thomas DO  Diagnosis: Confusion  Additional Pertinent Hx: Patient is a very pleasant 74-year-old female with medical history of prior stroke from home.  The patient has garbled speech is unable to contribute much to the history thus history is obtained from the patient's  is present at bedside.  He states that she was in her normal state of health approxi-9 PM return to bed last night.  He states this morning, she has been confused and in a \"daze\".  He states that she walked in the kitchen to make coffee, and then forgot everything that she was doing.  He states that she then went back to the kitchen made her coughing came back without the coffee.  As a attempt to talk to her, she has staring episodes and does not converse very much.     Upon my examination, the patient is mostly nonverbal.  She is able to answer few simple questions with garbled speech.  The  relates that the patient has been taking aspirin daily, but stopped taking all of her other medications including Lipitor approximate 6 months prior.     While in the ED, the patient was noted to have a hemoglobin of 7 down from approximate 11.6 approximately 1 year earlier.  Patient has never had a colonoscopy per the .  They have noticed no bleeding. - Per neurology note, \"Multiple infarcts involving the right frontal lobe right corona radiata right parietal lobe and punctate infarcts in both cerebellar hemisphere.\"     Prior Level of Function:  Lives With: Spouse  Type of  Sit:Moderate Assistance, pt needed hand over hand at left UE to reach back and needed facilitation to flex at hips and knees- this did improve w/ multiple attempts but still very slow procesing    Stand pivot transfer with walker with mod assist- pt needing assist to advance walker and step by step cues for wt shifting and advancing of feet   Ambulation:  Minimal Assistance, initially however needing increased assist to mod assist by end of walk   Distance: 6 feet forward and back   Surface: Level Tile  Device: Rolling Walker  Gait Deviations: Slow Mary and left lean w/ decreased wt shifting onto right LE, pt very slow w/ processing and needing assist for wt shifting and tactile cues for advancement of LEs more so when backing upto surface - she was able to advance her LEs however w/ decreased control and scissoring at times narrow base of support      Stairs:  Not Tested    Balance:  Sitting edge of bed unsupported pt completed reaching activity for objects off tray table needing CGA to occ min assist for balance due to post lean and needign cues to locate items on tray, pt did use her Left UE most of time w/ reaching task   Standing at walker worked on wt shifting more to the right due to left lean with min assist attempted toe tapping to target w/ decreased control and needing min to mod assist for balance   Exercise:  Patient was guided in 1 set(s) 10 reps of exercises to both lower extremities: ,Ankle pumps and followed with heelcord stretch, long arc qauds and marches  .  Exercises were completed for increased independence with functional mobility.    Functional Outcome Measures:  [facility] (6 CLICK) BASIC MOBILITY  AM-PAC Inpatient Mobility Raw Score : 11  AM-PAC Inpatient T-Scale Score : 33.86  Mobility Inpatient CMS 0-100% Score: 72.57        Modified [place] Scale:  +4 - Moderately severe disability; unable to walk and attend to bodily needs without assistance.   Patient could not live alone and could not

## 2024-08-31 PROCEDURE — 6370000000 HC RX 637 (ALT 250 FOR IP): Performed by: STUDENT IN AN ORGANIZED HEALTH CARE EDUCATION/TRAINING PROGRAM

## 2024-08-31 PROCEDURE — 2500000003 HC RX 250 WO HCPCS: Performed by: STUDENT IN AN ORGANIZED HEALTH CARE EDUCATION/TRAINING PROGRAM

## 2024-08-31 PROCEDURE — 2580000003 HC RX 258: Performed by: STUDENT IN AN ORGANIZED HEALTH CARE EDUCATION/TRAINING PROGRAM

## 2024-08-31 PROCEDURE — 1200000000 HC SEMI PRIVATE

## 2024-08-31 PROCEDURE — 99232 SBSQ HOSP IP/OBS MODERATE 35: CPT | Performed by: STUDENT IN AN ORGANIZED HEALTH CARE EDUCATION/TRAINING PROGRAM

## 2024-08-31 PROCEDURE — 2580000003 HC RX 258: Performed by: INTERNAL MEDICINE

## 2024-08-31 PROCEDURE — 6360000002 HC RX W HCPCS: Performed by: INTERNAL MEDICINE

## 2024-08-31 PROCEDURE — 6370000000 HC RX 637 (ALT 250 FOR IP): Performed by: PHYSICIAN ASSISTANT

## 2024-08-31 PROCEDURE — 97110 THERAPEUTIC EXERCISES: CPT

## 2024-08-31 PROCEDURE — 6360000002 HC RX W HCPCS: Performed by: STUDENT IN AN ORGANIZED HEALTH CARE EDUCATION/TRAINING PROGRAM

## 2024-08-31 PROCEDURE — 97116 GAIT TRAINING THERAPY: CPT

## 2024-08-31 RX ORDER — FOLIC ACID 1 MG/1
1 TABLET ORAL DAILY
Status: DISCONTINUED | OUTPATIENT
Start: 2024-09-01 | End: 2024-09-05 | Stop reason: HOSPADM

## 2024-08-31 RX ORDER — CALCIUM GLUCONATE 20 MG/ML
2000 INJECTION, SOLUTION INTRAVENOUS ONCE
Status: COMPLETED | OUTPATIENT
Start: 2024-08-31 | End: 2024-08-31

## 2024-08-31 RX ORDER — POTASSIUM CHLORIDE 1500 MG/1
40 TABLET, EXTENDED RELEASE ORAL ONCE
Status: DISCONTINUED | OUTPATIENT
Start: 2024-08-31 | End: 2024-08-31 | Stop reason: ALTCHOICE

## 2024-08-31 RX ORDER — LANOLIN ALCOHOL/MO/W.PET/CERES
100 CREAM (GRAM) TOPICAL DAILY
Status: DISCONTINUED | OUTPATIENT
Start: 2024-09-01 | End: 2024-09-05 | Stop reason: HOSPADM

## 2024-08-31 RX ADMIN — AMLODIPINE BESYLATE 10 MG: 10 TABLET ORAL at 07:55

## 2024-08-31 RX ADMIN — THIAMINE HYDROCHLORIDE 100 MG: 100 INJECTION, SOLUTION INTRAMUSCULAR; INTRAVENOUS at 07:55

## 2024-08-31 RX ADMIN — PANTOPRAZOLE SODIUM 40 MG: 40 INJECTION, POWDER, FOR SOLUTION INTRAVENOUS at 07:55

## 2024-08-31 RX ADMIN — ATORVASTATIN CALCIUM 40 MG: 40 TABLET, FILM COATED ORAL at 19:12

## 2024-08-31 RX ADMIN — FOLIC ACID 1 MG: 5 INJECTION, SOLUTION INTRAMUSCULAR; INTRAVENOUS; SUBCUTANEOUS at 10:14

## 2024-08-31 RX ADMIN — Medication 6 MG: at 19:12

## 2024-08-31 RX ADMIN — SODIUM CHLORIDE, POTASSIUM CHLORIDE, SODIUM LACTATE AND CALCIUM CHLORIDE: 600; 310; 30; 20 INJECTION, SOLUTION INTRAVENOUS at 02:00

## 2024-08-31 RX ADMIN — CLOPIDOGREL BISULFATE 75 MG: 75 TABLET ORAL at 07:55

## 2024-08-31 RX ADMIN — CALCIUM GLUCONATE 2000 MG: 20 INJECTION, SOLUTION INTRAVENOUS at 10:19

## 2024-08-31 RX ADMIN — POTASSIUM BICARBONATE 40 MEQ: 782 TABLET, EFFERVESCENT ORAL at 10:18

## 2024-08-31 RX ADMIN — ENOXAPARIN SODIUM 40 MG: 100 INJECTION SUBCUTANEOUS at 19:13

## 2024-08-31 RX ADMIN — ASPIRIN 81 MG 81 MG: 81 TABLET ORAL at 07:55

## 2024-08-31 NOTE — PLAN OF CARE
medications, impaired vision or hearing, underlying metabolic abnormalities, dehydration, psychiatric diagnoses, and notify attending LIP  2. [place] high risk fall precautions, as indicated  3. Provide frequent short contacts to provide reality reorientation, refocusing and direction  4. Decrease environmental stimuli, including noise as appropriate  5. Monitor and intervene to maintain adequate nutrition, hydration, elimination, sleep and activity  6. If unable to ensure safety without constant attention obtain sitter and review sitter guidelines with assigned personnel  7. Initiate Psychosocial CNS and Spiritual Care consult, as indicated  8/30/2024 2043 by Aruna Ball, RN  Outcome: Progressing  Flowsheets (Taken 8/30/2024 2043)  Effect of thought disturbance (confusion, delirium, dementia, or psychosis) are managed with adequate functional status:   Assess for contributors to thought disturbance, including medications, impaired vision or hearing, underlying metabolic abnormalities, dehydration, psychiatric diagnoses, notify LIP   [place] high risk fall precautions, as indicated   Provide frequent short contacts to provide reality reorientation, refocusing and direction   Decrease environmental stimuli, including noise as appropriate   Monitor and intervene to maintain adequate nutrition, hydration, elimination, sleep and activity     Problem: Hematologic - Adult  Goal: Maintains hematologic stability  8/30/2024 2043 by Aruna Ball, RN  Outcome: Progressing  Flowsheets (Taken 8/30/2024 2043)  Maintains hematologic stability: Assess for signs and symptoms of bleeding or hemorrhage     Problem: Nutrition Deficit:  Goal: Optimize nutritional status  8/30/2024 2043 by Aruna Ball, RN  Outcome: Progressing  Flowsheets (Taken 8/30/2024 2043)  Nutrient intake appropriate for improving, restoring, or maintaining nutritional needs:   Assess nutritional status and recommend course of action   Monitor oral

## 2024-08-31 NOTE — PROGRESS NOTES
[facility]  INPATIENT PHYSICAL THERAPY  DAILY NOTE  STRZ 6A PEDI/MED SURG - 6A-07/007-A      Discharge Recommendations: Subacte/Skilled Nursing Facility  Equipment Recommendations:Other: monitor for needs      Time In: 908  Time Out: 932  Timed Code Treatment Minutes: 24 Minutes  Minutes: 24          Date: 2024  Patient Name: Mila Andrade,  Gender:  female        MRN: [id number]  : 1950  (74 y.o.)     Referring Practitioner: Geremias Thomas DO  Diagnosis: Confusion  Additional Pertinent Hx: Patient is a very pleasant 74-year-old female with medical history of prior stroke from home.  The patient has garbled speech is unable to contribute much to the history thus history is obtained from the patient's  is present at bedside.  He states that she was in her normal state of health approxi-9 PM return to bed last night.  He states this morning, she has been confused and in a \"daze\".  He states that she walked in the kitchen to make coffee, and then forgot everything that she was doing.  He states that she then went back to the kitchen made her coughing came back without the coffee.  As a attempt to talk to her, she has staring episodes and does not converse very much.     Upon my examination, the patient is mostly nonverbal.  She is able to answer few simple questions with garbled speech.  The  relates that the patient has been taking aspirin daily, but stopped taking all of her other medications including Lipitor approximate 6 months prior.     While in the ED, the patient was noted to have a hemoglobin of 7 down from approximate 11.6 approximately 1 year earlier.  Patient has never had a colonoscopy per the .  They have noticed no bleeding. - Per neurology note, \"Multiple infarcts involving the right frontal lobe right corona radiata right parietal lobe and punctate infarcts in both cerebellar hemisphere.\"     Prior Level of Function:  Lives With: Spouse  Type of  Contact Guard Assistance  Static Standing Balance: Minimal Assistance    Exercise:  Patient was guided in 1 set(s) 15 reps of exercises to both lower extremities: ,Ankle pumps, Quad sets, Heelslides, Hip abduction/adduction, and Straight leg raises.  Exercises were completed for increased independence with functional mobility.  Maximal verbal cues as well as tactile cues to stay on task     Functional Outcome Measures:  [facility] (6 CLICK) BASIC MOBILITY  AM-PAC Inpatient Mobility Raw Score : 11  AM-PAC Inpatient T-Scale Score : 33.86  Mobility Inpatient CMS 0-100% Score: 72.57        Modified [place] Scale:  +4 - Moderately severe disability; unable to walk and attend to bodily needs without assistance.   Patient could not live alone and could not walk from one room to another without physical help from another person, but they can sit up in bed without any help.  Education provided regarding stroke rehabilitation management.    ASSESSMENT:  Assessment: Patient progressing toward established goals.  Activity Tolerance:  Patient tolerance of  treatment:Good.  Plan: Current Treatment Recommendations: Strengthening, Balance training, Functional mobility training, Endurance training, Safety education & training, Patient/Caregiver education & training, Gait training, Stair training, Neuromuscular re-education, Transfer training, Home exercise program, Equipment evaluation, education, & procurement, Therapeutic activities  General Plan:  (5-6xC)    Education:  Learners: Patient  Patient Education: Plan of Care, Bed Mobility, Transfers, Gait, Verbal Exercise Instruction    Goals:  Patient Goals : go home  Short Term Goals  Time Frame for Short Term Goals: at discharge  Short Term Goal 1: Pt to be Mod I for supine <> sit to get in/out of bed  Short Term Goal 2: Pt to be Mod I for sit <> stand to get up to ambulate  Short Term Goal 3: Pt to ambulate > 15 ft with RW with Supervision for household distances  Short Term Goal 4: Pt

## 2024-08-31 NOTE — PROGRESS NOTES
Hospitalist Progress Note  Internal Medicine Resident      Patient: Mila Andrade 74 y.o. female      Unit/Bed: -07/007-A    Admit Date: 8/23/2024      ASSESSMENT AND PLAN  Active Problems  Multiple infarcts involving the right frontal lobe right corona radiata right parietal lobe and punctate infarcts in both cerebellar hemisphere:JODI showed no Septal malformations or shunts. No mention of thrombi.   -Continue speech.   -Due to pt noncompliance her goals of care are in question. Pt spoke with palliative care. Do not want to change goals of care at this time.     Encephalopathy: likely 2/2 above recent stroke-Improving: Concern for not being able to intake enough nutrition to meet needs.   -Continue PT/OT/SLP  -Delirium precautions.     Acute anemia and shows iron deficiency: No evidence of bleed at this time. LDH normal. Retic count is low. Likely a bone marrow problem. With low iron may be a resource problem. Pt had Hgb 6.7 on admit. Received 1 unit. Was stable around 7.2. Dropped to 6.5 got another unit. Now 9.6. Hematology saw on 8/26. Stated pt has severe iron deficiency anemia. Started on IV iron.   -Received 3 days Ferrilecit and 5 days of B12 injection.   -Will dc hemoccult stool test at this time as CBC has been stable and they have not been collected.   -need GI workup for Iron def out pt.   -On protonix 40 bid.     Leukopenia (stable): Vitamin B12 and folate level are normal. Severe iron deficiency noted  -Hematology is following.   -Completed 5 days of B12 shots & 3 days Ferrlicit.     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 Seems largely unchanged. Will consider Pelvic us pending clinical course.     Moderate malnutrition: Seeing dietician. NG is no longer in. Dietician recommends MVI. Magic cup. Restarting Tube feeds.   -MVI qd    Resolved Problems    Chronic Conditions (reviewed and stable unless otherwise stated)        LDA: []CVC / []PICC / []Midline / []Huffman / []Drains / []Mediport /

## 2024-08-31 NOTE — PLAN OF CARE
Problem: Discharge Planning  Goal: Discharge to home or other facility with appropriate resources  8/31/2024 0856 by Aileen Amaya, RN  Outcome: Progressing  Flowsheets (Taken 8/30/2024 2043 by Aruna Ball, RN)  Discharge to home or other facility with appropriate resources:   Identify barriers to discharge with patient and caregiver   Identify discharge learning needs (meds, wound care, etc)   Refer to discharge planning if patient needs post-hospital services based on physician order or complex needs related to functional status, cognitive ability or social support system   Arrange for needed discharge resources and transportation as appropriate  8/30/2024 2043 by Aruna Ball, RN  Outcome: Progressing  Flowsheets (Taken 8/30/2024 2043)  Discharge to home or other facility with appropriate resources:   Identify barriers to discharge with patient and caregiver   Identify discharge learning needs (meds, wound care, etc)   Refer to discharge planning if patient needs post-hospital services based on physician order or complex needs related to functional status, cognitive ability or social support system   Arrange for needed discharge resources and transportation as appropriate     Problem: Chronic Conditions and Co-morbidities  Goal: Patient's chronic conditions and co-morbidity symptoms are monitored and maintained or improved  8/31/2024 0856 by Aileen Amaya, RN  Outcome: Progressing  Flowsheets (Taken 8/30/2024 2043 by Aruna Ball, RN)  Care Plan - Patient's Chronic Conditions and Co-Morbidity Symptoms are Monitored and Maintained or Improved:   Monitor and assess patient's chronic conditions and comorbid symptoms for stability, deterioration, or improvement   Collaborate with multidisciplinary team to address chronic and comorbid conditions and prevent exacerbation or deterioration   Update acute care plan with appropriate goals if chronic or comorbid symptoms are exacerbated and prevent overall

## 2024-09-01 LAB
ANION GAP SERPL CALC-SCNC: 11 MEQ/L (ref 8–16)
BUN SERPL-MCNC: 13 MG/DL (ref 7–22)
CALCIUM SERPL-MCNC: 9 MG/DL (ref 8.5–10.5)
CHLORIDE SERPL-SCNC: 108 MEQ/L (ref 98–111)
CO2 SERPL-SCNC: 26 MEQ/L (ref 23–33)
CREAT SERPL-MCNC: 0.7 MG/DL (ref 0.4–1.2)
DEPRECATED RDW RBC AUTO: 66.2 FL (ref 35–45)
ERYTHROCYTE [DISTWIDTH] IN BLOOD BY AUTOMATED COUNT: 27.4 % (ref 11.5–14.5)
GFR SERPL CREATININE-BSD FRML MDRD: > 90 ML/MIN/1.73M2
GLUCOSE SERPL-MCNC: 99 MG/DL (ref 70–108)
HCT VFR BLD AUTO: 36.5 % (ref 37–47)
HGB BLD-MCNC: 10.6 GM/DL (ref 12–16)
MCH RBC QN AUTO: 20.7 PG (ref 26–33)
MCHC RBC AUTO-ENTMCNC: 29 GM/DL (ref 32.2–35.5)
MCV RBC AUTO: 71.2 FL (ref 81–99)
PLATELET # BLD AUTO: 163 THOU/MM3 (ref 130–400)
PMV BLD AUTO: ABNORMAL FL (ref 9.4–12.4)
POTASSIUM SERPL-SCNC: 4 MEQ/L (ref 3.5–5.2)
RBC # BLD AUTO: 5.13 MILL/MM3 (ref 4.2–5.4)
SODIUM SERPL-SCNC: 145 MEQ/L (ref 135–145)
WBC # BLD AUTO: 4.3 THOU/MM3 (ref 4.8–10.8)

## 2024-09-01 PROCEDURE — 85027 COMPLETE CBC AUTOMATED: CPT

## 2024-09-01 PROCEDURE — 36415 COLL VENOUS BLD VENIPUNCTURE: CPT

## 2024-09-01 PROCEDURE — 99232 SBSQ HOSP IP/OBS MODERATE 35: CPT | Performed by: STUDENT IN AN ORGANIZED HEALTH CARE EDUCATION/TRAINING PROGRAM

## 2024-09-01 PROCEDURE — 6370000000 HC RX 637 (ALT 250 FOR IP): Performed by: STUDENT IN AN ORGANIZED HEALTH CARE EDUCATION/TRAINING PROGRAM

## 2024-09-01 PROCEDURE — 1200000000 HC SEMI PRIVATE

## 2024-09-01 PROCEDURE — 97530 THERAPEUTIC ACTIVITIES: CPT

## 2024-09-01 PROCEDURE — 6360000002 HC RX W HCPCS: Performed by: STUDENT IN AN ORGANIZED HEALTH CARE EDUCATION/TRAINING PROGRAM

## 2024-09-01 PROCEDURE — 80048 BASIC METABOLIC PNL TOTAL CA: CPT

## 2024-09-01 PROCEDURE — 6370000000 HC RX 637 (ALT 250 FOR IP): Performed by: PHYSICIAN ASSISTANT

## 2024-09-01 RX ADMIN — CLOPIDOGREL BISULFATE 75 MG: 75 TABLET ORAL at 08:27

## 2024-09-01 RX ADMIN — Medication 6 MG: at 19:29

## 2024-09-01 RX ADMIN — ASPIRIN 81 MG 81 MG: 81 TABLET ORAL at 08:27

## 2024-09-01 RX ADMIN — Medication 100 MG: at 08:27

## 2024-09-01 RX ADMIN — ATORVASTATIN CALCIUM 40 MG: 40 TABLET, FILM COATED ORAL at 19:30

## 2024-09-01 RX ADMIN — ENOXAPARIN SODIUM 40 MG: 100 INJECTION SUBCUTANEOUS at 19:30

## 2024-09-01 RX ADMIN — FOLIC ACID 1 MG: 1 TABLET ORAL at 08:27

## 2024-09-01 RX ADMIN — AMLODIPINE BESYLATE 10 MG: 10 TABLET ORAL at 08:27

## 2024-09-01 NOTE — PROGRESS NOTES
Hospitalist Progress Note  Internal Medicine Resident      Patient: Mila Andrade 74 y.o. female      Unit/Bed: 6A-07/007-A    Admit Date: 8/23/2024      ASSESSMENT AND PLAN      Multiple infarcts involving the right frontal lobe right corona radiata right parietal lobe and punctate infarcts in both cerebellar hemisphere:JODI showed no Septal malformations or shunts. No mention of thrombi.   -Continue speech.   -Continue PT/OT   -Due to pt noncompliance her goals of care are in question. Pt spoke with palliative care. Do not want to change goals of care at this time.      Encephalopathy: likely 2/2 above recent stroke: Concern for decreased nutritional intake.   -Continue PT/OT/SLP  -Delirium precautions.   -Did not speak much this morning   -There is some speech delay for sure      Acute anemia and shows iron deficiency: No evidence of bleed at this time. LDH normal. Retic count is low. Likely a bone marrow problem. With low iron may be a resource problem. Pt had Hgb 6.7 on admit. Received 1 unit. Was stable around 7.2. Dropped to 6.5 got another unit. Now 10.6. Hematology saw on 8/26. Stated pt has severe iron deficiency anemia. She has completed 3 days of Iron infusions.   -Received 3 days Ferrilecit and 5 days of B12 injection.   -need GI workup for Iron def out pt.   -Continue Protonix 40 bid.   -continue to monitor H&H Levels        Leukopenia (stable): Vitamin B12 and folate level are normal. Severe iron deficiency noted  -Hematology is following.   -Completed 5 days of B12 shots & 3 days Ferrlicit.      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 Seems largely unchanged. Will consider Pelvic us pending clinical course.      Moderate malnutrition: Seeing dietician. NG is no longer in. Dietician recommends MVI. Magic cup. Restarting Tube feeds.   -MVI qd         LDA: []CVC / []PICC / []Midline / []Huffman / []Drains / []Mediport / [x]None  Antibiotics: none  Steroids: none   Labs (still needed?): [x]Yes /

## 2024-09-01 NOTE — PLAN OF CARE
Problem: Discharge Planning  Goal: Discharge to home or other facility with appropriate resources  8/31/2024 2003 by Aruna Ball, RN  Outcome: Progressing  Flowsheets (Taken 8/31/2024 2003)  Discharge to home or other facility with appropriate resources:   Identify barriers to discharge with patient and caregiver   Identify discharge learning needs (meds, wound care, etc)   Refer to discharge planning if patient needs post-hospital services based on physician order or complex needs related to functional status, cognitive ability or social support system   Arrange for needed discharge resources and transportation as appropriate     Problem: Chronic Conditions and Co-morbidities  Goal: Patient's chronic conditions and co-morbidity symptoms are monitored and maintained or improved  8/31/2024 2003 by Aruna Ball, RN  Outcome: Progressing  Flowsheets (Taken 8/31/2024 2003)  Care Plan - Patient's Chronic Conditions and Co-Morbidity Symptoms are Monitored and Maintained or Improved:   Monitor and assess patient's chronic conditions and comorbid symptoms for stability, deterioration, or improvement   Collaborate with multidisciplinary team to address chronic and comorbid conditions and prevent exacerbation or deterioration   Update acute care plan with appropriate goals if chronic or comorbid symptoms are exacerbated and prevent overall improvement and discharge     Problem: Safety - Adult  Goal: Free from fall injury  8/31/2024 2003 by Aruna Ball, RN  Outcome: Progressing  Flowsheets (Taken 8/31/2024 2003)  Free From Fall Injury:   Based on caregiver fall risk screen, instruct family/caregiver to ask for assistance with transferring infant if caregiver noted to have fall risk factors   Instruct family/caregiver on patient safety     Problem: Pain  Goal: Verbalizes/displays adequate comfort level or baseline comfort level  8/31/2024 2003 by Aruna Ball, RN  Outcome: Progressing  Flowsheets (Taken

## 2024-09-01 NOTE — PROGRESS NOTES
Awareness, Impaired Attention, Difficulty Following Commands, Receptive Aphasia, Expressive Aphasia, and flat affect    ADL:   Footwear Management: Maximum Assistance.  To doff/don slipper socks; patient crossed leg after asked to demo doffing slipper sock and then no longer initiated in attempting to doff sock fully.  .    IADL:   Not Tested    BALANCE:  Sitting Balance:  Stand By Assistance, Contact Guard Assistance. Seated EOB  Standing Balance: Contact Guard Assistance, Minimal Assistance. With use of 2 w/w and [place] A for L hand placement on handle of 2 w/w    BED MOBILITY:  Supine to Sit: Dependent, X 1 after cued to initiate in moving legs to EOB with patient just flexing knees and staring at this writer and required assist to sit EOB    TRANSFERS:  Sit to Stand:  Minimal Assistance. From slightly elevated EOB and cues for tech and initiating     FUNCTIONAL MOBILITY:  Assistive Device: Rolling Walker  Assist Level:  Contact Guard Assistance and Minimal Assistance.   Distance:  from EOB to recliner  Increased time to step and move 2 w/w with MOD-MAX tactile cues to sequence sitting in recliner. Patient let go of 2 w/w and reached for recliner and required tactile cues to hips to fully pivot into recliner. Patient not initiating in adjusting self to sit square in recliner and required assist.      ADDITIONAL ACTIVITIES:  -attempted to have patient participate in UB strengthening with patient not following instructions or initiating and looking away from this writer.     AM-[facility] Inpatient Daily Activity Raw Score: 11  AM-PAC Inpatient ADL T-Scale Score : 29.04  ADL Inpatient CMS 0-100% Score: 70.42    Modified [place] Scale:  +4 - Moderately severe disability; unable to walk and attend to bodily needs without assistance.   Patient could not live alone and could not walk from one room to another without physical help from another person, but they can sit up in bed without any help.  Education provided regarding  stroke rehabilitation management.    ASSESSMENT:     Activity Tolerance:  Patient tolerance of  treatment: Fair treatment tolerance      Plan: Times Per Week: 6x C  Current Treatment Recommendations: Strengthening, Balance training, Functional mobility training, Endurance training, Neuromuscular re-education, Cognitive reorientation, Safety education & training, Patient/Caregiver education & training, Equipment evaluation, education, & procurement, Self-Care / ADL, Home management training, Cognitive/Perceptual training, Coordination training  ** OT to decrease frequency from 6 x to 4-5x due to participation and ability to gain max benefit from therapy.     Education:  Learners: Patient and Family  Role of OT, Plan of Care, ADL's, Home Exercise Program, Precautions, Hemibody Awareness/Attention, Reviewed Prior Education, Importance of Increasing Activity, Fall Prevention, and Assistive Device Safety    Goals  Short Term Goals  Time Frame for Short Term Goals: until discharge  Short Term Goal 1: Patient will safely complete various functional transfers with CGA and min cues for L attention in prep for toileting  Short Term Goal 2: Patient will improve functional ambulation to/from bathroom with CGA and mod cues for visually scanning in all planes in prep for return to PLOF.  Short Term Goal 3: Pt will complete UB bathing with SBA and min vcs for attention to LUE and use of LUE at least 50% of the time.  Short Term Goal 4: Patient will tolerate standing 4-6 minutes with LUE in wbing while visually locating 7/7 items in all visual fields to improve scanning and reaching across midline to obtain needed self care items.  Short Term Goal 5: Pt will sequence 2 step commands with 100% accuracy and 0-1 vcs for attention/problem-solving in prep for I/ADL engagement.  Long Term Goals  Time Frame for Long Term Goals : None due to ELOS    Following session, patient left in safe position with all fall risk precautions in

## 2024-09-02 LAB
DEPRECATED RDW RBC AUTO: 67.7 FL (ref 35–45)
ERYTHROCYTE [DISTWIDTH] IN BLOOD BY AUTOMATED COUNT: 27.8 % (ref 11.5–14.5)
HCT VFR BLD AUTO: 37.8 % (ref 37–47)
HGB BLD-MCNC: 10.8 GM/DL (ref 12–16)
MCH RBC QN AUTO: 20.5 PG (ref 26–33)
MCHC RBC AUTO-ENTMCNC: 28.6 GM/DL (ref 32.2–35.5)
MCV RBC AUTO: 71.9 FL (ref 81–99)
PLATELET # BLD AUTO: 167 THOU/MM3 (ref 130–400)
PMV BLD AUTO: ABNORMAL FL (ref 9.4–12.4)
RBC # BLD AUTO: 5.26 MILL/MM3 (ref 4.2–5.4)
WBC # BLD AUTO: 6 THOU/MM3 (ref 4.8–10.8)

## 2024-09-02 PROCEDURE — 6370000000 HC RX 637 (ALT 250 FOR IP): Performed by: STUDENT IN AN ORGANIZED HEALTH CARE EDUCATION/TRAINING PROGRAM

## 2024-09-02 PROCEDURE — 85027 COMPLETE CBC AUTOMATED: CPT

## 2024-09-02 PROCEDURE — 6360000002 HC RX W HCPCS: Performed by: STUDENT IN AN ORGANIZED HEALTH CARE EDUCATION/TRAINING PROGRAM

## 2024-09-02 PROCEDURE — 6370000000 HC RX 637 (ALT 250 FOR IP): Performed by: INTERNAL MEDICINE

## 2024-09-02 PROCEDURE — 1200000000 HC SEMI PRIVATE

## 2024-09-02 PROCEDURE — 6370000000 HC RX 637 (ALT 250 FOR IP): Performed by: PHYSICIAN ASSISTANT

## 2024-09-02 PROCEDURE — 99232 SBSQ HOSP IP/OBS MODERATE 35: CPT | Performed by: STUDENT IN AN ORGANIZED HEALTH CARE EDUCATION/TRAINING PROGRAM

## 2024-09-02 PROCEDURE — 36415 COLL VENOUS BLD VENIPUNCTURE: CPT

## 2024-09-02 RX ORDER — PANTOPRAZOLE SODIUM 40 MG/1
40 TABLET, DELAYED RELEASE ORAL
Status: DISCONTINUED | OUTPATIENT
Start: 2024-09-02 | End: 2024-09-05 | Stop reason: HOSPADM

## 2024-09-02 RX ADMIN — PANTOPRAZOLE SODIUM 40 MG: 40 TABLET, DELAYED RELEASE ORAL at 07:54

## 2024-09-02 RX ADMIN — AMLODIPINE BESYLATE 10 MG: 10 TABLET ORAL at 07:54

## 2024-09-02 RX ADMIN — ENOXAPARIN SODIUM 40 MG: 100 INJECTION SUBCUTANEOUS at 20:37

## 2024-09-02 RX ADMIN — Medication 100 MG: at 07:54

## 2024-09-02 RX ADMIN — PANTOPRAZOLE SODIUM 40 MG: 40 TABLET, DELAYED RELEASE ORAL at 15:36

## 2024-09-02 RX ADMIN — ATORVASTATIN CALCIUM 40 MG: 40 TABLET, FILM COATED ORAL at 21:01

## 2024-09-02 RX ADMIN — Medication 6 MG: at 22:29

## 2024-09-02 RX ADMIN — FOLIC ACID 1 MG: 1 TABLET ORAL at 07:54

## 2024-09-02 RX ADMIN — CLOPIDOGREL BISULFATE 75 MG: 75 TABLET ORAL at 07:54

## 2024-09-02 RX ADMIN — ASPIRIN 81 MG 81 MG: 81 TABLET ORAL at 07:54

## 2024-09-02 ASSESSMENT — PAIN SCALES - GENERAL: PAINLEVEL_OUTOF10: 0

## 2024-09-02 NOTE — PLAN OF CARE
pain and request assistance   Administer analgesics based on type and severity of pain and evaluate response   Implement non-pharmacological measures as appropriate and evaluate response   Assess pain using appropriate pain scale     Problem: Skin/Tissue Integrity  Goal: Absence of new skin breakdown  Description: 1.  Monitor for areas of redness and/or skin breakdown  2.  Assess vascular access sites hourly  3.  Every 4-6 hours minimum:  Change oxygen saturation probe site  4.  Every 4-6 hours:  If on nasal continuous positive airway pressure, respiratory therapy assess nares and determine need for appliance change or resting period.  Outcome: Progressing     Problem: Neurosensory - Adult  Goal: Achieves stable or improved neurological status  Outcome: Progressing  Flowsheets (Taken 9/1/2024 1926)  Achieves stable or improved neurological status: Assess for and report changes in neurological status  Goal: Achieves maximal functionality and self care  Outcome: Progressing  Flowsheets (Taken 9/1/2024 1926)  Achieves maximal functionality and self care: Monitor swallowing and airway patency with patient fatigue and changes in neurological status     Problem: ABCDS Injury Assessment  Goal: Absence of physical injury  Outcome: Progressing  Flowsheets (Taken 9/1/2024 2034)  Absence of Physical Injury: Implement safety measures based on patient assessment     Problem: Confusion  Goal: Confusion, delirium, dementia, or psychosis is improved or at baseline  Description: INTERVENTIONS:  1. Assess for possible contributors to thought disturbance, including medications, impaired vision or hearing, underlying metabolic abnormalities, dehydration, psychiatric diagnoses, and notify attending LIP  2. [place] high risk fall precautions, as indicated  3. Provide frequent short contacts to provide reality reorientation, refocusing and direction  4. Decrease environmental stimuli, including noise as appropriate  5. Monitor and  intervene to maintain adequate nutrition, hydration, elimination, sleep and activity  6. If unable to ensure safety without constant attention obtain sitter and review sitter guidelines with assigned personnel  7. Initiate Psychosocial CNS and Spiritual Care consult, as indicated  Outcome: Progressing  Flowsheets (Taken 9/1/2024 1926)  Effect of thought disturbance (confusion, delirium, dementia, or psychosis) are managed with adequate functional status:   Assess for contributors to thought disturbance, including medications, impaired vision or hearing, underlying metabolic abnormalities, dehydration, psychiatric diagnoses, notify [facility]   [place] high risk fall precautions, as indicated   Provide frequent short contacts to provide reality reorientation, refocusing and direction   Decrease environmental stimuli, including noise as appropriate   Monitor and intervene to maintain adequate nutrition, hydration, elimination, sleep and activity     Problem: Hematologic - Adult  Goal: Maintains hematologic stability  Outcome: Progressing  Flowsheets (Taken 9/1/2024 1926)  Maintains hematologic stability: Assess for signs and symptoms of bleeding or hemorrhage     Problem: Nutrition Deficit:  Goal: Optimize nutritional status  Outcome: Progressing  Flowsheets (Taken 8/31/2024 2003 by Aruna Ball, RN)  Nutrient intake appropriate for improving, restoring, or maintaining nutritional needs:   Assess nutritional status and recommend course of action   Recommend appropriate diets, oral nutritional supplements, and vitamin/mineral supplements   Monitor oral intake, labs, and treatment plans     Problem: Safety - Medical Restraint  Goal: Remains free of injury from restraints (Restraint for Interference with Medical Device)  Description: INTERVENTIONS:  1. Determine that other, less restrictive measures have been tried or would not be effective before applying the restraint  2. Evaluate the patient's condition at the time of

## 2024-09-02 NOTE — PLAN OF CARE
Problem: Discharge Planning  Goal: Discharge to home or other facility with appropriate resources  Outcome: Progressing  Flowsheets  Taken 9/2/2024 1524  Discharge to home or other facility with appropriate resources:   Identify barriers to discharge with patient and caregiver   Arrange for needed discharge resources and transportation as appropriate  Taken 9/2/2024 1210  Discharge to home or other facility with appropriate resources: Identify barriers to discharge with patient and caregiver     Problem: Chronic Conditions and Co-morbidities  Goal: Patient's chronic conditions and co-morbidity symptoms are monitored and maintained or improved  Outcome: Progressing  Flowsheets  Taken 9/2/2024 1524  Care Plan - Patient's Chronic Conditions and Co-Morbidity Symptoms are Monitored and Maintained or Improved:   Monitor and assess patient's chronic conditions and comorbid symptoms for stability, deterioration, or improvement   Collaborate with multidisciplinary team to address chronic and comorbid conditions and prevent exacerbation or deterioration  Taken 9/2/2024 1210  Care Plan - Patient's Chronic Conditions and Co-Morbidity Symptoms are Monitored and Maintained or Improved: Monitor and assess patient's chronic conditions and comorbid symptoms for stability, deterioration, or improvement     Problem: Safety - Adult  Goal: Free from fall injury  Outcome: Progressing  Flowsheets (Taken 9/2/2024 1524)  Free From Fall Injury: Instruct family/caregiver on patient safety     Problem: Pain  Goal: Verbalizes/displays adequate comfort level or baseline comfort level  Outcome: Progressing  Flowsheets (Taken 9/2/2024 1524)  Verbalizes/displays adequate comfort level or baseline comfort level:   Encourage patient to monitor pain and request assistance   Implement non-pharmacological measures as appropriate and evaluate response     Problem: Skin/Tissue Integrity  Goal: Absence of new skin breakdown  Description: 1.  Monitor for  frequent short contacts to provide reality reorientation, refocusing and direction  4. Decrease environmental stimuli, including noise as appropriate  5. Monitor and intervene to maintain adequate nutrition, hydration, elimination, sleep and activity  6. If unable to ensure safety without constant attention obtain sitter and review sitter guidelines with assigned personnel  7. Initiate Psychosocial CNS and Spiritual Care consult, as indicated  Outcome: Progressing  Flowsheets  Taken 9/2/2024 1524  Effect of thought disturbance (confusion, delirium, dementia, or psychosis) are managed with adequate functional status:   Assess for contributors to thought disturbance, including medications, impaired vision or hearing, underlying metabolic abnormalities, dehydration, psychiatric diagnoses, notify [facility] high risk fall precautions, as indicated  Taken 9/2/2024 1210  Effect of thought disturbance (confusion, delirium, dementia, or psychosis) are managed with adequate functional status: Assess for contributors to thought disturbance, including medications, impaired vision or hearing, underlying metabolic abnormalities, dehydration, psychiatric diagnoses, notify LIP     Problem: Hematologic - Adult  Goal: Maintains hematologic stability  Outcome: Progressing  Flowsheets  Taken 9/2/2024 1524  Maintains hematologic stability: Assess for signs and symptoms of bleeding or hemorrhage  Taken 9/2/2024 1210  Maintains hematologic stability: Assess for signs and symptoms of bleeding or hemorrhage     Problem: Nutrition Deficit:  Goal: Optimize nutritional status  Outcome: Progressing  Flowsheets (Taken 9/2/2024 1524)  Nutrient intake appropriate for improving, restoring, or maintaining nutritional needs:   Assess nutritional status and recommend course of action   Monitor oral intake, labs, and treatment plans    Care plan reviewed with patient and family  Patients' family verbalizes understanding of the plan of care and

## 2024-09-02 NOTE — PROGRESS NOTES
Pt arrived to [facility] via bed from 6A.  Complaints: None.    Vital signs obtained. Assessment and data collection initiated. Oriented to room. Policies and procedures for  explained to patient and family. All questions answered with no further questions at this time. Fall prevention and safety brochure discussed with patient.  Bed alarm on. Call light in reach.Oriented to room.   Anne Rajan, RN, RN 9/2/2024 12:20 PM

## 2024-09-02 NOTE — PROGRESS NOTES
Hospitalist Progress Note  Internal Medicine Resident      Patient: Mila Andrade 74 y.o. female      Unit/Bed: -07/007-A    Admit Date: 8/23/2024      ASSESSMENT AND PLAN      Multiple infarcts involving the right frontal lobe right corona radiata right parietal lobe and punctate infarcts in both cerebellar hemisphere:JODI showed no Septal malformations or shunts. No mention of thrombi.   -Continue speech.   -Continue PT/OT   -Due to pt noncompliance her goals of care are in question. Pt spoke with palliative care. Do not want to change goals of care at this time.      Encephalopathy: likely 2/2 above recent stroke: Concern for decreased nutritional intake.   -Continue PT/OT/SLP  -Delirium precautions.   -No changes with speech per my assessment from yesterday  - and nurse mention patient was speaking to the nurse this morning and eating on her own.  -There is some speech delay for sure      Acute anemia and shows iron deficiency: No evidence of bleed at this time. LDH normal. Retic count is low. Likely a bone marrow problem. With low iron may be a resource problem. Pt had Hgb 6.7 on admit. Received 1 unit. Was stable around 7.2. Dropped to 6.5 got another unit.  Today Hgb 10.8. Hematology saw on 8/26. Stated pt has severe iron deficiency anemia. She has completed 3 days of Iron infusions.   -Received 3 days Ferrilecit and 5 days of B12 injection.   -need GI workup for Iron def out pt.   -Continue Protonix 40 bid.   -continue to monitor H&H Levels        Leukopenia (stable): Vitamin B12 and folate level are normal. Severe iron deficiency noted  -Hematology is following.   -Completed 5 days of B12 shots & 3 days Ferrlicit.      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 Seems largely unchanged. Will consider Pelvic us pending clinical course.      Moderate malnutrition: Seeing dietician. NG is no longer in. Dietician recommends MVI. Magic cup. Restarting Tube feeds.   -MVI qd         LDA: []CVC /

## 2024-09-03 PROCEDURE — 92526 ORAL FUNCTION THERAPY: CPT

## 2024-09-03 PROCEDURE — 97116 GAIT TRAINING THERAPY: CPT

## 2024-09-03 PROCEDURE — 1200000000 HC SEMI PRIVATE

## 2024-09-03 PROCEDURE — 6360000002 HC RX W HCPCS: Performed by: STUDENT IN AN ORGANIZED HEALTH CARE EDUCATION/TRAINING PROGRAM

## 2024-09-03 PROCEDURE — 6370000000 HC RX 637 (ALT 250 FOR IP): Performed by: STUDENT IN AN ORGANIZED HEALTH CARE EDUCATION/TRAINING PROGRAM

## 2024-09-03 PROCEDURE — 99232 SBSQ HOSP IP/OBS MODERATE 35: CPT | Performed by: STUDENT IN AN ORGANIZED HEALTH CARE EDUCATION/TRAINING PROGRAM

## 2024-09-03 PROCEDURE — 97112 NEUROMUSCULAR REEDUCATION: CPT

## 2024-09-03 PROCEDURE — 97530 THERAPEUTIC ACTIVITIES: CPT

## 2024-09-03 PROCEDURE — 6370000000 HC RX 637 (ALT 250 FOR IP): Performed by: INTERNAL MEDICINE

## 2024-09-03 PROCEDURE — 97535 SELF CARE MNGMENT TRAINING: CPT

## 2024-09-03 RX ADMIN — CLOPIDOGREL BISULFATE 75 MG: 75 TABLET ORAL at 10:33

## 2024-09-03 RX ADMIN — ASPIRIN 81 MG 81 MG: 81 TABLET ORAL at 10:32

## 2024-09-03 RX ADMIN — AMLODIPINE BESYLATE 10 MG: 10 TABLET ORAL at 10:31

## 2024-09-03 RX ADMIN — ATORVASTATIN CALCIUM 40 MG: 40 TABLET, FILM COATED ORAL at 20:13

## 2024-09-03 RX ADMIN — Medication 100 MG: at 10:32

## 2024-09-03 RX ADMIN — PANTOPRAZOLE SODIUM 40 MG: 40 TABLET, DELAYED RELEASE ORAL at 05:03

## 2024-09-03 RX ADMIN — FOLIC ACID 1 MG: 1 TABLET ORAL at 10:34

## 2024-09-03 RX ADMIN — ENOXAPARIN SODIUM 40 MG: 100 INJECTION SUBCUTANEOUS at 20:13

## 2024-09-03 ASSESSMENT — PAIN SCALES - GENERAL: PAINLEVEL_OUTOF10: 0

## 2024-09-03 NOTE — PLAN OF CARE
Problem: Discharge Planning  Goal: Discharge to home or other facility with appropriate resources  9/3/2024 0959 by Adalberto Brantley RN  Outcome: Progressing  9/2/2024 2208 by Mae Marie RN  Outcome: Progressing  Flowsheets (Taken 9/2/2024 1524 by Anne Rajan RN)  Discharge to home or other facility with appropriate resources:   Identify barriers to discharge with patient and caregiver   Arrange for needed discharge resources and transportation as appropriate     Problem: Chronic Conditions and Co-morbidities  Goal: Patient's chronic conditions and co-morbidity symptoms are monitored and maintained or improved  9/3/2024 0959 by Adalberto Brantley RN  Outcome: Progressing  9/2/2024 2208 by Mae Marie RN  Outcome: Progressing  Flowsheets (Taken 9/2/2024 1524 by Anne Rajan RN)  Care Plan - Patient's Chronic Conditions and Co-Morbidity Symptoms are Monitored and Maintained or Improved:   Monitor and assess patient's chronic conditions and comorbid symptoms for stability, deterioration, or improvement   Collaborate with multidisciplinary team to address chronic and comorbid conditions and prevent exacerbation or deterioration     Problem: Safety - Adult  Goal: Free from fall injury  9/3/2024 0959 by Adalberto Brantley RN  Outcome: Progressing  9/2/2024 2208 by Mae Marie RN  Outcome: Progressing  Flowsheets (Taken 9/2/2024 2208)  Free From Fall Injury: Instruct family/caregiver on patient safety     Problem: Pain  Goal: Verbalizes/displays adequate comfort level or baseline comfort level  9/3/2024 0959 by Adalberto Brantley RN  Outcome: Progressing  9/2/2024 2208 by Mae Marie RN  Outcome: Progressing  Flowsheets (Taken 9/2/2024 1524 by Anne Rajan RN)  Verbalizes/displays adequate comfort level or baseline comfort level:   Encourage patient to monitor pain and request assistance   Implement non-pharmacological measures as appropriate and evaluate response     Problem: Skin/Tissue Integrity  Goal:  Device)  Description: INTERVENTIONS:  1. Determine that other, less restrictive measures have been tried or would not be effective before applying the restraint  2. Evaluate the patient's condition at the time of restraint application  3. Inform patient/family regarding the reason for restraint  4. Q2H: Monitor safety, psychosocial status, comfort, nutrition and hydration  9/3/2024 0959 by Adalberto Brantley RN  Outcome: Progressing  9/2/2024 2208 by Mae Marie RN  Outcome: Progressing  Flowsheets (Taken 8/29/2024 0329 by Kimmy Story, RN)  Remains free of injury from restraints (restraint for interference with medical device): Determine that other, less restrictive measures have been tried or would not be effective before applying the restraint     Problem: Safety - Violent/Self-destructive Restraint  Goal: Remains free of injury from restraints (Restraint for Violent/Self-Destructive Behavior)  Description: INTERVENTIONS:  1. Determine that de-escalation and other, less restrictive measures have been tried or would not be effective before applying the restraint  2. Identify and document the criteria for restraint  3. Evaluate the patient's condition at the time of restraint application  4. Inform patient/family regarding the reason for restraint/seclusion  5. Q2H: Monitor comfort, nutrition and hydration needs  6. Q15M: Perform safety checks including skin, circulation, sensory, respiratory and psychological status  7. Ensure continuous observation  8. Identify and implement measures to help patient regain control, assess readiness for release and initiate progressive release per policy  9/3/2024 0959 by Adalberto Brantley RN  Outcome: Progressing  9/2/2024 2208 by Mae Marie RN  Outcome: Progressing  Flowsheets (Taken 8/29/2024 0329 by Kimmy Story RN)  Remains Free of Injury from Restraints (Restraint for Violent/Self-destructive Behavior): Determine that de-escalation and other, less restrictive measures

## 2024-09-03 NOTE — PLAN OF CARE
Problem: Discharge Planning  Goal: Discharge to home or other facility with appropriate resources  9/2/2024 2208 by Mae Marie RN  Outcome: Progressing  Flowsheets (Taken 9/2/2024 1524 by Anne Rajan RN)  Discharge to home or other facility with appropriate resources:   Identify barriers to discharge with patient and caregiver   Arrange for needed discharge resources and transportation as appropriate  9/2/2024 1524 by Anne Rajan RN  Outcome: Progressing  Flowsheets  Taken 9/2/2024 1524  Discharge to home or other facility with appropriate resources:   Identify barriers to discharge with patient and caregiver   Arrange for needed discharge resources and transportation as appropriate  Taken 9/2/2024 1210  Discharge to home or other facility with appropriate resources: Identify barriers to discharge with patient and caregiver     Problem: Chronic Conditions and Co-morbidities  Goal: Patient's chronic conditions and co-morbidity symptoms are monitored and maintained or improved  9/2/2024 2208 by Mae Marie RN  Outcome: Progressing  Flowsheets (Taken 9/2/2024 1524 by Anne Rajan RN)  Care Plan - Patient's Chronic Conditions and Co-Morbidity Symptoms are Monitored and Maintained or Improved:   Monitor and assess patient's chronic conditions and comorbid symptoms for stability, deterioration, or improvement   Collaborate with multidisciplinary team to address chronic and comorbid conditions and prevent exacerbation or deterioration  9/2/2024 1524 by Anne Rajan RN  Outcome: Progressing  Flowsheets  Taken 9/2/2024 1524  Care Plan - Patient's Chronic Conditions and Co-Morbidity Symptoms are Monitored and Maintained or Improved:   Monitor and assess patient's chronic conditions and comorbid symptoms for stability, deterioration, or improvement   Collaborate with multidisciplinary team to address chronic and comorbid conditions and prevent exacerbation or deterioration  Taken 9/2/2024 1210  Care Plan -         Problem: Neurosensory - Adult  Goal: Achieves stable or improved neurological status  9/2/2024 2208 by Mae Marie RN  Outcome: Progressing  Flowsheets (Taken 9/2/2024 1524 by Anne Rajan RN)  Achieves stable or improved neurological status:   Assess for and report changes in neurological status   Initiate measures to prevent increased intracranial pressure  9/2/2024 1524 by Anne Rajan RN  Outcome: Progressing  Flowsheets  Taken 9/2/2024 1524  Achieves stable or improved neurological status:   Assess for and report changes in neurological status   Initiate measures to prevent increased intracranial pressure  Taken 9/2/2024 1210  Achieves stable or improved neurological status: Assess for and report changes in neurological status  Goal: Achieves maximal functionality and self care  9/2/2024 2208 by Mae Marie RN  Outcome: Progressing  Flowsheets (Taken 9/2/2024 1524 by Anne Rajan RN)  Achieves maximal functionality and self care: Monitor swallowing and airway patency with patient fatigue and changes in neurological status  9/2/2024 1524 by Anne Rajan RN  Outcome: Progressing  Flowsheets  Taken 9/2/2024 1524  Achieves maximal functionality and self care: Monitor swallowing and airway patency with patient fatigue and changes in neurological status  Taken 9/2/2024 1210  Achieves maximal functionality and self care: Monitor swallowing and airway patency with patient fatigue and changes in neurological status     Problem: ABCDS Injury Assessment  Goal: Absence of physical injury  9/2/2024 2208 by Mae Marie RN  Outcome: Progressing  Flowsheets (Taken 9/2/2024 1524 by Anne Rajan RN)  Absence of Physical Injury: Implement safety measures based on patient assessment  9/2/2024 1524 by Anne Rajan RN  Outcome: Progressing  Flowsheets (Taken 9/2/2024 1524)  Absence of Physical Injury: Implement safety measures based on patient assessment     Problem: Confusion  Goal: Confusion,

## 2024-09-03 NOTE — PALLIATIVE CARE
Follow Up / Progress Note        Patient:   Mila Andrade  YOB: 1950  Age:  74 y.o.  Room:  [facility]07/[facility]  MRN:  [id number]           Chart reviewed. Plan to start precert to Iesha Turner. Per Dr Laboy's note patient is now speaking, appetite has improved. Please call if palliative care can further assist.        Electronically signed by Nadine Flores RN on 9/3/2024 at 2:11 PM             Palliative Care Office: [phone]

## 2024-09-03 NOTE — CARE COORDINATION
9/3/24, 1:53 PM EDT    DISCHARGE ON GOING EVALUATION    Mila [facility] day: 11  Location: -07/007-A Reason for admit: Confusion [R41.0]  Anemia [D64.9]  Altered mental status, unspecified altered mental status type [R41.82]  Iron deficiency anemia, unspecified iron deficiency anemia type [D50.9]  Cerebrovascular accident (CVA) due to bilateral stenosis of middle cerebral arteries (HCC) [I63.513]     Procedures:   8/23 EEG: This EEG was abnormal due to: -Diffuse background slowing and disorganization -Asymmetric right hemispheric slowing  8/24 ECHO: EF 55-60%.   8/28 JODI: EF 50-55%. No septal malformations or shunts. No mention of thrombi.     Imaging since last note: none     Barriers to Discharge: Transfer from . Hospitalist following. PT/OT/SLP. Dietician. Tolerating pureed diet. Plan for MBS. Telesitter at bedside- will need removed prior to starting precert.     PCP: Saundra Nelson APRN - CNP  Readmission Risk Score: 16.3    Patient Goals/Plan/Treatment Preferences: [place]. Requires precert. SW following.

## 2024-09-03 NOTE — PROGRESS NOTES
[facility]  [facility] ONC MED 5K  Occupational Therapy  Daily Note    Discharge Recommendations: Subacute/skilled nursing facility  Equipment Recommendations: Equipment Needed: No  Other: continue to monitor, will likely require some AD dependent on abilities      Time In: 1224  Time Out: 1254  Timed Code Treatment Minutes: 30 Minutes  Minutes: 30          Date: 9/3/2024  Patient Name: Mila Andrade,   Gender: female      Room: [facility]/007-  MRN: [id number]  : 1950  (74 y.o.)  Referring Practitioner: Geremias Thomas DO  Diagnosis: Confusion  Additional Pertinent Hx: Patient is a very pleasant 74-year-old female with medical history of prior stroke from home.  The patient has garbled speech is unable to contribute much to the history thus history is obtained from the patient's  is present at bedside.  He states that she was in her normal state of health approxi-9 PM return to bed last night.  He states this morning, she has been confused and in a \"daze\".  He states that she walked in the kitchen to make coffee, and then forgot everything that she was doing.  He states that she then went back to the kitchen made her coughing came back without the coffee.  As a attempt to talk to her, she has staring episodes and does not converse very much.     Upon my examination, the patient is mostly nonverbal.  She is able to answer few simple questions with garbled speech.  The  relates that the patient has been taking aspirin daily, but stopped taking all of her other medications including Lipitor approximate 6 months prior.     While in the ED, the patient was noted to have a hemoglobin of 7 down from approximate 11.6 approximately 1 year earlier.  Patient has never had a colonoscopy per the .  They have noticed no bleeding. - Per neurology note, \"Multiple infarcts involving the right frontal lobe right corona radiata right parietal lobe and punctate infarcts in both cerebellar

## 2024-09-03 NOTE — PROGRESS NOTES
Hospitalist Progress Note  Internal Medicine Resident      Patient: Mila Andrade 74 y.o. female      Unit/Bed: -07/007-A    Admit Date: 8/23/2024      ASSESSMENT AND PLAN      Multiple infarcts involving the right frontal lobe right corona radiata right parietal lobe and punctate infarcts in both cerebellar hemisphere:JODI showed no Septal malformations or shunts. No mention of thrombi.   -Continue speech.   -Continue PT/OT   -Patient spoke this morning which is a big change from yesterday  -Plan is for patient to go to [facility] for rehab once approved     Encephalopathy: likely 2/2 above recent stroke: Concern for decreased nutritional intake.   -Continue PT/OT/SLP  -Delirium precautions.   -Big change from my assessment yesterday.  Patient is speaking to me we had a conversation about how she is feeling and what she ate.  This is a big change and good progress.  -Plan is for patient to go to [facility]  -Speech delay continues and appears very selective     Acute anemia and shows iron deficiency: No evidence of bleed at this time. LDH normal. Retic count is low. Likely a bone marrow problem. With low iron may be a resource problem. Pt had Hgb 6.7 on admit. Received 1 unit. Was stable around 7.2. Dropped to 6.5 got another unit.  Today Hgb 10.8. Hematology saw on 8/26. Stated pt has severe iron deficiency anemia. She has completed 3 days of Iron infusions.   -Received 3 days Ferrilecit and 5 days of B12 injection.   -need GI workup for Iron def out pt.   -Continue Protonix 40 bid.   -continue to monitor H&H Levels        Leukopenia (stable): Vitamin B12 and folate level are normal. Severe iron deficiency noted  -Hematology is following.   -Completed 5 days of B12 shots & 3 days Ferrlicit.      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 Seems largely unchanged. Will consider Pelvic us pending clinical course.      Moderate malnutrition: Seeing dietician. NG is no longer in. Dietician recommends

## 2024-09-03 NOTE — PROGRESS NOTES
documented this date, though certainly cannot r/o airway invasion without formal instrumentation. Given patient's positive imaging for acute infarcts, dependence for feeding (Direct 1:1 Supervision/Assistance), minimal verbalizations, suspected reduced mentation, and overt s/s of airway invasion this date recommendations for completion of instrumental evaluation (MBS) in subsequent therapy session. ST provided education re: instrumental evaluation with agreement to participate in evaluation.    Recommendations for continuation of puree diet with thin liquids (NO Straw) with plans to complete instrumental evaluation 9/4/2024.    Patient with call light in place and withOUT respiratory distress upon leaving room; RN Adalberto notified re: recommendations with verbal receptiveness noted.    Pulmonary Status  WBC (9/2/2024): 6.0 - WNL  Lung Sounds (9/3/2024)   Right Lung Sounds: Clear   Left Lung Sounds: Clear    CXR (8/23/2024)  IMPRESSION:  1. No acute cardiopulmonary finding.    SHORT TERM GOAL #2:  Goal 2: Staff and ST to promote and adhere to aggressive comprehensive oral care procedural analysis to mitigate potential bacteria colonization/infection.  INTERVENTIONS:DNT due to focus on other STGs; OT, Giordano, with completion immediately prior to ST arrival.    SHORT TERM GOAL #3:  Goal 3: Patient will participate in completion of rbthmk-mxqukdwv-xymaqufcc evaluation as clinically indicated to further develop plan of care.  INTERVENTIONS: ST attempted completion of mpdqqz-hweuoeon-mgjdiplvg evaluation this date, however, unable to complete given focus on dysphagia management this date. Plans to complete in subsequent sessions as patient is available/appropriate.     Long-Term Goals:  No LTGs established due to short ELOS.       Functional Oral Intake Scale: Total Oral Intake: 5.  Total oral intake of multiple consistencies requiring special preparation    EDUCATION:  Learner: Patient and Significant Other  Education:   Reviewed diet and strategies, Reviewed signs, symptoms and risks of aspiration, Reviewed ST goals and Plan of Care, Reviewed recommendations for follow-up, Education Related to Potential Risks and Complications Due to Impairment/Illness/Injury, and Education Related to Stroke Diagnosis  Evaluation of Education: Verbalizes understanding and Needs further instruction    ASSESSMENT/PLAN:  Activity Tolerance:  Patient tolerance of  treatment: good.      Assessment/Plan: Patient progressing toward established goals.  Continues to require skilled care of licensed speech pathologist to progress toward achievement of established goals and plan of care..     Plan for Next Session: MBS  Discharge Recommendations: SNF     Alexis Page M.S., CCC-SLP [id number]

## 2024-09-03 NOTE — CARE COORDINATION
9/3/24, 7:20 AM EDT    DISCHARGE BARRIERS        Patient transferred to  7. Report given to unit SW, Aruna, regarding discharge plan for this patient.    [facility]erral [facility], will need precert if accepting.  [facility] concerned about diet and previous need for restraints.

## 2024-09-03 NOTE — CARE COORDINATION
9/3/24, 1:15 PM EDT    DISCHARGE PLANNING EVALUATION    Call to [facility], left a voicemail for a call back.     1:58 PM EDT  GERSON spoke with Pura with [facility], they can accept pt. Pt currently with virtual , [facility] would like to have this out before starting precert. SW let her know she would check with nursing and provider to see if precert ready to be started and get back with her.     Gerson met with pt and  at bedside. Pt up in her chair. Sw did update pt and  on acceptance.  reports pt ate well today.     SW did message provider asking if pt is ready to have precert started.

## 2024-09-04 ENCOUNTER — APPOINTMENT (OUTPATIENT)
Dept: GENERAL RADIOLOGY | Age: 74
End: 2024-09-04
Payer: MEDICARE

## 2024-09-04 LAB
BASOPHILS ABSOLUTE: 0 THOU/MM3 (ref 0–0.1)
BASOPHILS NFR BLD AUTO: 0.6 %
DEPRECATED RDW RBC AUTO: 67.9 FL (ref 35–45)
EOSINOPHIL NFR BLD AUTO: 2.6 %
EOSINOPHILS ABSOLUTE: 0.1 THOU/MM3 (ref 0–0.4)
ERYTHROCYTE [DISTWIDTH] IN BLOOD BY AUTOMATED COUNT: 27.9 % (ref 11.5–14.5)
HCT VFR BLD AUTO: 33.8 % (ref 37–47)
HGB BLD-MCNC: 9.9 GM/DL (ref 12–16)
IMM GRANULOCYTES # BLD AUTO: 0.01 THOU/MM3 (ref 0–0.07)
IMM GRANULOCYTES NFR BLD AUTO: 0.2 %
LYMPHOCYTES ABSOLUTE: 1.2 THOU/MM3 (ref 1–4.8)
LYMPHOCYTES NFR BLD AUTO: 23.2 %
MCH RBC QN AUTO: 20.9 PG (ref 26–33)
MCHC RBC AUTO-ENTMCNC: 29.3 GM/DL (ref 32.2–35.5)
MCV RBC AUTO: 71.5 FL (ref 81–99)
MONOCYTES ABSOLUTE: 0.5 THOU/MM3 (ref 0.4–1.3)
MONOCYTES NFR BLD AUTO: 9.4 %
NEUTROPHILS ABSOLUTE: 3.3 THOU/MM3 (ref 1.8–7.7)
NEUTROPHILS NFR BLD AUTO: 64 %
NRBC BLD AUTO-RTO: 0 /100 WBC
PLATELET # BLD AUTO: 158 THOU/MM3 (ref 130–400)
PMV BLD AUTO: ABNORMAL FL (ref 9.4–12.4)
RBC # BLD AUTO: 4.73 MILL/MM3 (ref 4.2–5.4)
WBC # BLD AUTO: 5.1 THOU/MM3 (ref 4.8–10.8)

## 2024-09-04 PROCEDURE — 6370000000 HC RX 637 (ALT 250 FOR IP): Performed by: INTERNAL MEDICINE

## 2024-09-04 PROCEDURE — 6370000000 HC RX 637 (ALT 250 FOR IP): Performed by: PHYSICIAN ASSISTANT

## 2024-09-04 PROCEDURE — 85025 COMPLETE CBC W/AUTO DIFF WBC: CPT

## 2024-09-04 PROCEDURE — 6370000000 HC RX 637 (ALT 250 FOR IP): Performed by: STUDENT IN AN ORGANIZED HEALTH CARE EDUCATION/TRAINING PROGRAM

## 2024-09-04 PROCEDURE — 97530 THERAPEUTIC ACTIVITIES: CPT

## 2024-09-04 PROCEDURE — 92526 ORAL FUNCTION THERAPY: CPT

## 2024-09-04 PROCEDURE — 97110 THERAPEUTIC EXERCISES: CPT

## 2024-09-04 PROCEDURE — 1200000000 HC SEMI PRIVATE

## 2024-09-04 PROCEDURE — 6360000002 HC RX W HCPCS: Performed by: STUDENT IN AN ORGANIZED HEALTH CARE EDUCATION/TRAINING PROGRAM

## 2024-09-04 PROCEDURE — 36415 COLL VENOUS BLD VENIPUNCTURE: CPT

## 2024-09-04 PROCEDURE — 97116 GAIT TRAINING THERAPY: CPT

## 2024-09-04 PROCEDURE — 74230 X-RAY XM SWLNG FUNCJ C+: CPT

## 2024-09-04 PROCEDURE — 92611 MOTION FLUOROSCOPY/SWALLOW: CPT

## 2024-09-04 PROCEDURE — 2500000003 HC RX 250 WO HCPCS: Performed by: STUDENT IN AN ORGANIZED HEALTH CARE EDUCATION/TRAINING PROGRAM

## 2024-09-04 RX ADMIN — FOLIC ACID 1 MG: 1 TABLET ORAL at 09:38

## 2024-09-04 RX ADMIN — ENOXAPARIN SODIUM 40 MG: 100 INJECTION SUBCUTANEOUS at 20:07

## 2024-09-04 RX ADMIN — Medication 100 MG: at 09:38

## 2024-09-04 RX ADMIN — ASPIRIN 81 MG 81 MG: 81 TABLET ORAL at 09:38

## 2024-09-04 RX ADMIN — ATORVASTATIN CALCIUM 40 MG: 40 TABLET, FILM COATED ORAL at 20:08

## 2024-09-04 RX ADMIN — BARIUM SULFATE 10 ML: 400 PASTE ORAL at 08:42

## 2024-09-04 RX ADMIN — Medication 6 MG: at 20:08

## 2024-09-04 RX ADMIN — AMLODIPINE BESYLATE 10 MG: 10 TABLET ORAL at 09:38

## 2024-09-04 RX ADMIN — PANTOPRAZOLE SODIUM 40 MG: 40 TABLET, DELAYED RELEASE ORAL at 05:13

## 2024-09-04 RX ADMIN — CLOPIDOGREL BISULFATE 75 MG: 75 TABLET ORAL at 09:38

## 2024-09-04 RX ADMIN — BARIUM SULFATE 20 ML: 0.81 POWDER, FOR SUSPENSION ORAL at 08:42

## 2024-09-04 ASSESSMENT — PAIN SCALES - GENERAL: PAINLEVEL_OUTOF10: 0

## 2024-09-04 NOTE — DISCHARGE INSTR - COC
Continuity of Care Form    Patient Name: Mila Andrade   :  1950  MRN:  [id number]    Admit date:  2024  Discharge date:  2024    Code Status Order: Limited   Advance Directives:   Advance Care Flowsheet Documentation             Admitting Physician:  No admitting provider for patient encounter.  PCP: Saundra Nelson, APRN - CNP    Discharging Nurse: Adalberto Brantley RN  Discharging Hospital Unit/Room#: 5K-07/007-A  Discharging Unit Phone Number: [phone]    Emergency Contact:   Extended Emergency Contact Information  Primary Emergency Contact: Anil Andrade  Address: [address] [place] [id number] [facility]  Home Phone: [phone]  Relation: Spouse  Secondary Emergency Contact: Chasidy Higgins  Home Phone: [phone]  Relation: Brother/Sister   needed? No    Past Surgical History:  Past Surgical History:   Procedure Laterality Date    CATARACT REMOVAL      TRANSESOPHAGEAL ECHOCARDIOGRAM N/A 2024    TRANSESOPHAGEAL ECHOCARDIOGRAM performed by Everton Finley MD at [facility] ENDOSCOPY       Immunization History:   Immunization History   Administered Date(s) Administered    COVID-19, PFIZER PURPLE top, DILUTE for use, (age 12 y+), 30mcg/0.3mL 2021, 2021, 2021       Active Problems:  Patient Active Problem List   Diagnosis Code    Cerebrovascular accident (CVA) due to bilateral stenosis of middle cerebral arteries (HCC) I63.513    Ascending aortic aneurysm (HCC) I71.21    Late effect of cerebrovascular accident (CVA) I69.30    Essential hypertension I10    Aphasia R47.01    Homonymous hemianopia, right H53.461    B12 deficiency E53.8    Expressive aphasia R47.01    Fall from stool W08.XXXA    Traumatic hematoma of scalp S00.03XA    Alcoholic intoxication without complication (HCC) F10.920    Infestation by bed bug B88.8    Intraparenchymal hemorrhage of brain (HCC) I61.9    Anemia D64.9    Altered mental status R41.82    Confusion

## 2024-09-04 NOTE — PROGRESS NOTES
09/04/24 0945   Encounter Summary   Encounter Overview/Reason Spiritual/Emotional Needs   Service Provided For Patient   Referral/Consult From Rounding   Support System Spouse;Family members   Last Encounter  09/04/24   Complexity of Encounter Moderate   Begin Time 0936   End Time  0945   Total Time Calculated 9 min   Spiritual/Emotional needs   Type Spiritual Support   Assessment/Intervention/Outcome   Assessment Calm;Coping   Intervention Nurtured Hope;Prayer (assurance of)/[place];Sustaining Presence/Ministry of presence   Outcome Comfort     Assessment:  In my encounter with the 74 yr old patient, while rounding  the unit 5K,  I provided spiritual care to patient through conversation, I also came to assess the patient's spiritual needs present.     Interventions:  I provided prayer, emotional support and words of comfort.     Outcomes:  The patient was encouraged and didn't share any further spiritual needs at this time.     Plan:  Chaplains will follow-up at a later time for assessment of any spiritual care needs present.

## 2024-09-04 NOTE — CARE COORDINATION
9/4/24, 11:35 AM EDT    DISCHARGE ON GOING EVALUATION    Mila MEYER [facility] day: 12  Location: -07/007-A Reason for admit: Confusion [R41.0]  Anemia [D64.9]  Altered mental status, unspecified altered mental status type [R41.82]  Iron deficiency anemia, unspecified iron deficiency anemia type [D50.9]  Cerebrovascular accident (CVA) due to bilateral stenosis of middle cerebral arteries (HCC) [I63.513]     Procedures:   8/23 EEG: This EEG was abnormal due to:  -Diffuse background slowing and disorganization  -Asymmetric right hemispheric slowing  8/24 ECHO: EF 55-60%.   8/28 JODI   9/04 MBS    IMPRESSION:  1. No laryngeal penetration or aspiration of barium.  2. Additional recommendations from the speech therapist will follow.    Barriers to Discharge: Hospitalist following, Lovenox, prn Tylenol, and Zofran, electrolyte replacement protocol, pureed diet, SCD's, PT/OT, SS, Dietician, and Palliative Care.     PCP: Saundra Nelson APRN - CNP  Readmission Risk Score: 16.1    Patient Goals/Plan/Treatment Preferences: Mila is from home with her . She is planned for [facility], pending insurance approval. Pre-cert initiated today.

## 2024-09-04 NOTE — PLAN OF CARE
Problem: Discharge Planning  Goal: Discharge to home or other facility with appropriate resources  9/3/2024 2225 by Mae Marie RN  Outcome: Progressing  Flowsheets (Taken 9/2/2024 1524 by Anne Rajan RN)  Discharge to home or other facility with appropriate resources:   Identify barriers to discharge with patient and caregiver   Arrange for needed discharge resources and transportation as appropriate  9/3/2024 0959 by Adalberto Brantley RN  Outcome: Progressing     Problem: Chronic Conditions and Co-morbidities  Goal: Patient's chronic conditions and co-morbidity symptoms are monitored and maintained or improved  9/3/2024 2225 by Mae Marie RN  Outcome: Progressing  Flowsheets (Taken 9/2/2024 1524 by Anne Rajan RN)  Care Plan - Patient's Chronic Conditions and Co-Morbidity Symptoms are Monitored and Maintained or Improved:   Monitor and assess patient's chronic conditions and comorbid symptoms for stability, deterioration, or improvement   Collaborate with multidisciplinary team to address chronic and comorbid conditions and prevent exacerbation or deterioration  9/3/2024 0959 by Adalberto Brantley RN  Outcome: Progressing     Problem: Safety - Adult  Goal: Free from fall injury  9/3/2024 2225 by Mae Marie RN  Outcome: Progressing  Flowsheets (Taken 9/2/2024 2208)  Free From Fall Injury: Instruct family/caregiver on patient safety  9/3/2024 0959 by Adalberto Brantley RN  Outcome: Progressing     Problem: Pain  Goal: Verbalizes/displays adequate comfort level or baseline comfort level  9/3/2024 2225 by Mae Marie RN  Outcome: Progressing  Flowsheets (Taken 9/2/2024 1524 by Anne Rajan RN)  Verbalizes/displays adequate comfort level or baseline comfort level:   Encourage patient to monitor pain and request assistance   Implement non-pharmacological measures as appropriate and evaluate response  9/3/2024 0959 by Adalberto Brantley RN  Outcome: Progressing     Problem: Skin/Tissue Integrity  Goal:  the restraint  9/3/2024 0959 by Adalberto Brantley, RN  Outcome: Progressing     Care plan reviewed with patient. Patient verbalized understanding of the plan of care and contribute to goal setting.

## 2024-09-04 NOTE — PROGRESS NOTES
[facility]  SPEECH THERAPY  STRZ ONC MED 5K  Modified Barium Swallow + Dysphagia Therapy    Discharge Recommendations: SNF  DIET ORDER RECOMMENDATIONS AFTER EVALUATION: Puree diet, thin liquids -- Medications whole in puree     Time In: 0830  Time Out: 0850  Total Minutes: 20 minutes   MBS: 10 minutes  Dysphagia: 10 minutes     Date: 2024  Patient Name: Mila Andrade      CSN: [id number]   : 1950  (74 y.o.)  Gender: female   Referring Physician:  Tiff Lee PA-C   Diagnosis: Confusion   Precautions: Fall risk  History of Present Illness/Injury: Patient admitted to [facility] for above dx. Per chart review, \"75 yo female w/ PMHx of CVA, AAA, EtOH dependence, Hx of L subarachnoid hemorrhage , L PCA infarct , who presented to [facility] ED on 2024 w/ CC: generalized acute confusion since 9 pm the previous evening Found to have acute infarcts in posterior frontal lobe, R corona radiata. Deep white matter of R parietal lobe & punctate infarcts in both cerebellar hemispheres on MRI.\"    Patient has a past medical history of Ascending aortic aneurysm (HCC), Stroke (HCC), and Unspecified cerebral artery occlusion with cerebral infarction.    Current Diet: Puree, thin liquids     Pain: No pain reported.    SUBJECTIVE:  Patient arrived to fluoro suite by bed. Alert and cooperative throughout the controlled study. Minimal verbalizations evident throughout.     OBJECTIVE:    Respiratory Status:  Room Air    Behavioral Observation:  Alert    PATIENT WAS EVALUATED USING:  Barium:Thin Liquids and Puree    ORAL PHASE TOM SCORE: (Dysphagia outcome and severity scale)  3 = Moderate Dysphagia - Total assist with strategies - Two or more consistencies restricted - Moderate oral residue clears with cue    PHARYNGEAL PHASE TOM SCORE: (Dysphagia outcome and severity scale)  6 = WFL/Modified Independent - May have mild pharyngeal delay or residue but clears spontaneously - No aspiration or laryngeal  penetration    EVIDENCE FOR LARYNGEAL PENETRATION AND/OR ASPIRATION:  No evidence of laryngeal penetration  No evidence of aspiration    PENETRATION-ASPIRATION SCALE (PAS):  Thin Liquids: 1 = Material does not enter the airway  Puree:  1 = Material does not enter the airway    ESOPHAGEAL PHASE:   No significant findings    ATTEMPTED TECHNIQUES:  Small Bolus Size Effective    Straw Effective    Cup Effective    Large Drinks Not Attempted    Consecutive Drinks Not Attempted    Chin Tuck Not Attempted    Head Turn Not Attempted    Spoon Presentations Effective    Volitional Cough Not Attempted    Spontaneous Cough Not Attempted           DIAGNOSTIC IMPRESSIONS:  Patient presents with moderate pharyngeal dysphagia and MOD I pharyngeal phase characterized by clinical findings outlined above. Cognition overall impacting oral phase of swallow. Patient with significantly prolonged oral bolus holding ranging from 23-55 seconds. Unable to improve timeliness of AP transit/pharyngeal swallow onset with verbal and tactile cues. Patient with uncoordinated manipulation and AP transit evident. Appropriate bolus containment evident and timely pharyngeal trigger with bolus entry into the pharynx. Appropriate hyolaryngeal elevation and anterior excursion to result in adequate epiglottic inversion for airway protection. Mild oral residuals evident within oral cavity, no pharyngeal residue present. No laryngeal penetration or tracheal aspiration observed across all trials.     Recommend puree diet, thin liquids with intermittent supervision. Skilled ST services recommended for follow up interventions.       Diet Recommendations:  Puree diet, thin liquids   Strategies:  Full Upright Position, Small Bite/Sip, Pulmonary Monitoring, Intermittent Supervision, Medications Whole with Puree, Alternate Solids and Liquids, Limit Distractions, and Monitor for Fatigue   Rehabilitation Potential: good    EDUCATION:  Learner: Patient  Education:

## 2024-09-04 NOTE — PROGRESS NOTES
[facility]  STRZ ONC MED 5K  Occupational Therapy  Daily Note    Discharge Recommendations: ECF with OT, 24 hour assistance or supervision, Not safe to return home at this time, and Patient would benefit from continued OT at discharge  Equipment Recommendations: Equipment Needed: No  Other: continue to monitor, will likely require some AD dependent on abilities      Time In: 901  Time Out: 926  Timed Code Treatment Minutes: 25 Minutes  Minutes: 25          Date: 2024  Patient Name: Mila Andrade,   Gender: female      Room: [facility]  MRN: [id number]  : 1950  (74 y.o.)  Referring Practitioner: Geremias Thomas DO  Diagnosis: Confusion  Additional Pertinent Hx: Patient is a very pleasant 74-year-old female with medical history of prior stroke from home.  The patient has garbled speech is unable to contribute much to the history thus history is obtained from the patient's  is present at bedside.  He states that she was in her normal state of health approxi-9 PM return to bed last night.  He states this morning, she has been confused and in a \"daze\".  He states that she walked in the kitchen to make coffee, and then forgot everything that she was doing.  He states that she then went back to the kitchen made her coughing came back without the coffee.  As a attempt to talk to her, she has staring episodes and does not converse very much.     Upon my examination, the patient is mostly nonverbal.  She is able to answer few simple questions with garbled speech.  The  relates that the patient has been taking aspirin daily, but stopped taking all of her other medications including Lipitor approximate 6 months prior.     While in the ED, the patient was noted to have a hemoglobin of 7 down from approximate 11.6 approximately 1 year earlier.  Patient has never had a colonoscopy per the .  They have noticed no bleeding. - Per neurology note, \"Multiple infarcts involving the

## 2024-09-04 NOTE — PLAN OF CARE
Problem: Discharge Planning  Goal: Discharge to home or other facility with appropriate resources  9/3/2024 2225 by Mae Marie RN  Outcome: Progressing  Flowsheets (Taken 9/2/2024 1524 by Anne Rajan RN)  Discharge to home or other facility with appropriate resources:   Identify barriers to discharge with patient and caregiver   Arrange for needed discharge resources and transportation as appropriate     Problem: Chronic Conditions and Co-morbidities  Goal: Patient's chronic conditions and co-morbidity symptoms are monitored and maintained or improved  9/3/2024 2225 by Mae Marie RN  Outcome: Progressing  Flowsheets (Taken 9/2/2024 1524 by Anne Rajan RN)  Care Plan - Patient's Chronic Conditions and Co-Morbidity Symptoms are Monitored and Maintained or Improved:   Monitor and assess patient's chronic conditions and comorbid symptoms for stability, deterioration, or improvement   Collaborate with multidisciplinary team to address chronic and comorbid conditions and prevent exacerbation or deterioration     Problem: Safety - Adult  Goal: Free from fall injury  9/3/2024 2225 by Mae Marie RN  Outcome: Progressing  Flowsheets (Taken 9/2/2024 2208)  Free From Fall Injury: Instruct family/caregiver on patient safety     Problem: Pain  Goal: Verbalizes/displays adequate comfort level or baseline comfort level  9/3/2024 2225 by Mae Marie RN  Outcome: Progressing  Flowsheets (Taken 9/2/2024 1524 by Anne Rajan RN)  Verbalizes/displays adequate comfort level or baseline comfort level:   Encourage patient to monitor pain and request assistance   Implement non-pharmacological measures as appropriate and evaluate response     Problem: Skin/Tissue Integrity  Goal: Absence of new skin breakdown  Description: 1.  Monitor for areas of redness and/or skin breakdown  2.  Assess vascular access sites hourly  3.  Every 4-6 hours minimum:  Change oxygen saturation probe site  4.  Every 4-6 hours:  If on  CNS and Spiritual Care consult, as indicated  9/3/2024 2225 by Mae Marie RN  Outcome: Progressing  Flowsheets (Taken 9/2/2024 1524 by Anne Rajan RN)  Effect of thought disturbance (confusion, delirium, dementia, or psychosis) are managed with adequate functional status:   Assess for contributors to thought disturbance, including medications, impaired vision or hearing, underlying metabolic abnormalities, dehydration, psychiatric diagnoses, notify [facility]   [place] high risk fall precautions, as indicated     Problem: Hematologic - Adult  Goal: Maintains hematologic stability  9/3/2024 2225 by Mae Marie RN  Outcome: Progressing  Flowsheets (Taken 9/2/2024 1524 by Anne Rajan RN)  Maintains hematologic stability: Assess for signs and symptoms of bleeding or hemorrhage     Problem: Nutrition Deficit:  Goal: Optimize nutritional status  9/3/2024 2225 by Mae Marie RN  Outcome: Progressing  Flowsheets (Taken 9/2/2024 1524 by Anne Rajan RN)  Nutrient intake appropriate for improving, restoring, or maintaining nutritional needs:   Assess nutritional status and recommend course of action   Monitor oral intake, labs, and treatment plans     Problem: Safety - Medical Restraint  Goal: Remains free of injury from restraints (Restraint for Interference with Medical Device)  Description: INTERVENTIONS:  1. Determine that other, less restrictive measures have been tried or would not be effective before applying the restraint  2. Evaluate the patient's condition at the time of restraint application  3. Inform patient/family regarding the reason for restraint  4. Q2H: Monitor safety, psychosocial status, comfort, nutrition and hydration  9/3/2024 2225 by Mae Marie RN  Outcome: Progressing  Flowsheets (Taken 8/29/2024 0329 by Kimmy Story RN)  Remains free of injury from restraints (restraint for interference with medical device): Determine that other, less restrictive measures have been tried or

## 2024-09-04 NOTE — CARE COORDINATION
9/4/24, 8:26 AM EDT    DISCHARGE PLANNING EVALUATION    Call to Pura with [facility], virtual  discontinued yesterday, SW asked to have precert started, they will do this and keep SW updated.

## 2024-09-04 NOTE — PROGRESS NOTES
very pleasant 74-year-old female with medical history of prior stroke from home.  The patient has garbled speech is unable to contribute much to the history thus history is obtained from the patient's  is present at bedside.  He states that she was in her normal state of health approxi-9 PM return to bed last night.  He states this morning, she has been confused and in a \"daze\".  He states that she walked in the kitchen to make coffee, and then forgot everything that she was doing.  He states that she then went back to the kitchen made her coughing came back without the coffee.  As a attempt to talk to her, she has staring episodes and does not converse very much.     Upon my examination, the patient is mostly nonverbal.  She is able to answer few simple questions with garbled speech.  The  relates that the patient has been taking aspirin daily, but stopped taking all of her other medications including Lipitor approximate 6 months prior.     While in the ED, the patient was noted to have a hemoglobin of 7 down from approximate 11.6 approximately 1 year earlier.  Patient has never had a colonoscopy per the .  They have noticed no bleeding.\"     8/24: \"74-year-old who was brought in by  for altered mental status. Patient was noted to have multiple infarcts involving the right hemisphere and also the cerebellum. This morning I was called for patient who was having increasing confusion and change in her NIH score. But when I got to see her she was attempting to get out of bed. She was able to follow instructions at times but not consistently. She was moving both lower extremities and her right upper extremity but not much of the left. She appears not to be able to see. She definitely had garbled speech. Patient has been n.p.o. at this time speech therapy and physical therapy has been consulted. I did reach out to neurology and recommended repeat stat CT at this time. She is also receiving  the left carotid bulb. This does not contribute to a hemodynamically significant stenosis. These findings were related to Jennifer Colon of the neurology service via [employer] at the time of this dictation. **This report has been created using voice recognition software. It may contain minor errors which are inherent in voice recognition technology.** Electronically signed by Dr. Alyce Nelson    CTA HEAD W WO CONTRAST (CODE STROKE)    Result Date: 8/23/2024  PROCEDURE: CTA NECK W WO CONTRAST, CTA HEAD W WO CONTRAST CLINICAL INFORMATION: stroke. Stroke symptoms. Slurred speech. COMPARISON: Head CT 8/23/2024, CTA head neck 2/19/2020. TECHNIQUE: 1 mm axial images were obtained through the head and neck after the fast bolus administration of contrast. A noncontrast localizer was obtained. 3-D reconstructions were performed on a dedicated 3-D workstation. These include multiplanar MPR images and multiplanar MIP images.  Centerline reconstructions were obtained of the carotid systems. Isovue intravenous contrast was given. The [employer] application was used in analyzing the CTA head. An image was sent to PACS. All CT scans at this facility use dose modulation, iterative reconstruction, and/or weight-based dosing when appropriate to reduce radiation dose to as low as reasonably achievable. FINDINGS: CTA NECK: Aortic arch and branches: There is mild atherosclerosis of the aortic arch. The origins of the innominate artery and left common carotid artery are normal. The subclavian artery origins are normal. Right common carotid artery/ICA: The right common carotid artery is normal. There is minimal calcified atherosclerosis of the right carotid bulb. There is no stenosis. The right internal carotid artery is normal. Left common carotid artery/ICA: The left common carotid artery is normal. There is worsening atherosclerosis of the left carotid bulb. Using NASCET criteria and the distal left internal carotid artery as the

## 2024-09-04 NOTE — PROGRESS NOTES
[facility]  INPATIENT PHYSICAL THERAPY  DAILY NOTE  STRZ ONC MED 5K - 5K-07/007-A      Discharge Recommendations: Subacte/Skilled Nursing Facility and Patient would benefit from continued PT at discharge  Equipment Recommendations:Other: monitor for needs      Time In: 1002  Time Out: 1030  Timed Code Treatment Minutes: 28 Minutes  Minutes: 28          Date: 2024  Patient Name: Mila Andrade,  Gender:  female        MRN: [id number]  : 1950  (74 y.o.)     Referring Practitioner: Geremias Thomas DO  Diagnosis: Confusion  Additional Pertinent Hx: Patient is a very pleasant 74-year-old female with medical history of prior stroke from home.  The patient has garbled speech is unable to contribute much to the history thus history is obtained from the patient's  is present at bedside.  He states that she was in her normal state of health approxi-9 PM return to bed last night.  He states this morning, she has been confused and in a \"daze\".  He states that she walked in the kitchen to make coffee, and then forgot everything that she was doing.  He states that she then went back to the kitchen made her coughing came back without the coffee.  As a attempt to talk to her, she has staring episodes and does not converse very much.     Upon my examination, the patient is mostly nonverbal.  She is able to answer few simple questions with garbled speech.  The  relates that the patient has been taking aspirin daily, but stopped taking all of her other medications including Lipitor approximate 6 months prior.     While in the ED, the patient was noted to have a hemoglobin of 7 down from approximate 11.6 approximately 1 year earlier.  Patient has never had a colonoscopy per the .  They have noticed no bleeding. - Per neurology note, \"Multiple infarcts involving the right frontal lobe right corona radiata right parietal lobe and punctate infarcts in both cerebellar hemisphere.\"     Prior

## 2024-09-05 VITALS
HEART RATE: 56 BPM | DIASTOLIC BLOOD PRESSURE: 58 MMHG | OXYGEN SATURATION: 99 % | SYSTOLIC BLOOD PRESSURE: 129 MMHG | HEIGHT: 60 IN | TEMPERATURE: 97.5 F | BODY MASS INDEX: 24.67 KG/M2 | WEIGHT: 125.66 LBS | RESPIRATION RATE: 14 BRPM

## 2024-09-05 PROBLEM — R41.0 CONFUSION: Status: RESOLVED | Noted: 2024-08-23 | Resolved: 2024-09-05

## 2024-09-05 PROBLEM — S00.03XA TRAUMATIC HEMATOMA OF SCALP: Status: RESOLVED | Noted: 2023-07-22 | Resolved: 2024-09-05

## 2024-09-05 PROBLEM — R41.82 ALTERED MENTAL STATUS: Status: RESOLVED | Noted: 2024-08-23 | Resolved: 2024-09-05
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
## 2024-09-05 LAB
ANION GAP SERPL CALC-SCNC: 10 MEQ/L (ref 8–16)
ANISOCYTOSIS BLD QL SMEAR: PRESENT
BASOPHILS ABSOLUTE: 0 THOU/MM3 (ref 0–0.1)
BASOPHILS NFR BLD AUTO: 0.9 %
BUN SERPL-MCNC: 20 MG/DL (ref 7–22)
CALCIUM SERPL-MCNC: 8.6 MG/DL (ref 8.5–10.5)
CHLORIDE SERPL-SCNC: 103 MEQ/L (ref 98–111)
CO2 SERPL-SCNC: 25 MEQ/L (ref 23–33)
CREAT SERPL-MCNC: 0.6 MG/DL (ref 0.4–1.2)
DEPRECATED RDW RBC AUTO: 69.1 FL (ref 35–45)
ELLIPTOCYTES: ABNORMAL
EOSINOPHIL NFR BLD AUTO: 2.8 %
EOSINOPHILS ABSOLUTE: 0.1 THOU/MM3 (ref 0–0.4)
ERYTHROCYTE [DISTWIDTH] IN BLOOD BY AUTOMATED COUNT: 28.1 % (ref 11.5–14.5)
GFR SERPL CREATININE-BSD FRML MDRD: > 90 ML/MIN/1.73M2
GLUCOSE SERPL-MCNC: 94 MG/DL (ref 70–108)
HCT VFR BLD AUTO: 34.9 % (ref 37–47)
HGB BLD-MCNC: 10.2 GM/DL (ref 12–16)
IMM GRANULOCYTES # BLD AUTO: 0.02 THOU/MM3 (ref 0–0.07)
IMM GRANULOCYTES NFR BLD AUTO: 0.4 %
LYMPHOCYTES ABSOLUTE: 1.1 THOU/MM3 (ref 1–4.8)
LYMPHOCYTES NFR BLD AUTO: 22.6 %
MCH RBC QN AUTO: 21.2 PG (ref 26–33)
MCHC RBC AUTO-ENTMCNC: 29.2 GM/DL (ref 32.2–35.5)
MCV RBC AUTO: 72.6 FL (ref 81–99)
MONOCYTES ABSOLUTE: 0.5 THOU/MM3 (ref 0.4–1.3)
MONOCYTES NFR BLD AUTO: 10.9 %
NEUTROPHILS ABSOLUTE: 2.9 THOU/MM3 (ref 1.8–7.7)
NEUTROPHILS NFR BLD AUTO: 62.4 %
NRBC BLD AUTO-RTO: 0 /100 WBC
PLATELET # BLD AUTO: 157 THOU/MM3 (ref 130–400)
PMV BLD AUTO: ABNORMAL FL (ref 9.4–12.4)
POTASSIUM SERPL-SCNC: 4.1 MEQ/L (ref 3.5–5.2)
RBC # BLD AUTO: 4.81 MILL/MM3 (ref 4.2–5.4)
SCAN OF BLOOD SMEAR: NORMAL
SODIUM SERPL-SCNC: 138 MEQ/L (ref 135–145)
WBC # BLD AUTO: 4.7 THOU/MM3 (ref 4.8–10.8)

## 2024-09-05 PROCEDURE — 97535 SELF CARE MNGMENT TRAINING: CPT

## 2024-09-05 PROCEDURE — 6370000000 HC RX 637 (ALT 250 FOR IP): Performed by: INTERNAL MEDICINE

## 2024-09-05 PROCEDURE — 36415 COLL VENOUS BLD VENIPUNCTURE: CPT

## 2024-09-05 PROCEDURE — 6370000000 HC RX 637 (ALT 250 FOR IP): Performed by: STUDENT IN AN ORGANIZED HEALTH CARE EDUCATION/TRAINING PROGRAM

## 2024-09-05 PROCEDURE — 97530 THERAPEUTIC ACTIVITIES: CPT

## 2024-09-05 PROCEDURE — 85025 COMPLETE CBC W/AUTO DIFF WBC: CPT

## 2024-09-05 PROCEDURE — 97116 GAIT TRAINING THERAPY: CPT

## 2024-09-05 PROCEDURE — 80048 BASIC METABOLIC PNL TOTAL CA: CPT

## 2024-09-05 RX ORDER — LANOLIN ALCOHOL/MO/W.PET/CERES
100 CREAM (GRAM) TOPICAL DAILY
Qty: 30 TABLET | Refills: 3 | Status: SHIPPED | OUTPATIENT
Start: 2024-09-06

## 2024-09-05 RX ORDER — BISACODYL 10 MG
10 SUPPOSITORY, RECTAL RECTAL PRN
Qty: 4 SUPPOSITORY | Refills: 0 | Status: SHIPPED | OUTPATIENT
Start: 2024-09-05

## 2024-09-05 RX ORDER — PANTOPRAZOLE SODIUM 40 MG/1
40 TABLET, DELAYED RELEASE ORAL DAILY
Qty: 30 TABLET | Refills: 3 | Status: SHIPPED | OUTPATIENT
Start: 2024-09-05

## 2024-09-05 RX ORDER — FOLIC ACID 1 MG/1
1 TABLET ORAL DAILY
Qty: 30 TABLET | Refills: 3 | Status: SHIPPED | OUTPATIENT
Start: 2024-09-05

## 2024-09-05 RX ORDER — ATORVASTATIN CALCIUM 40 MG/1
40 TABLET, FILM COATED ORAL NIGHTLY
Qty: 30 TABLET | Refills: 3 | Status: SHIPPED | OUTPATIENT
Start: 2024-09-05

## 2024-09-05 RX ORDER — CLOPIDOGREL BISULFATE 75 MG/1
75 TABLET ORAL DAILY
Qty: 90 TABLET | Refills: 3 | Status: SHIPPED | OUTPATIENT
Start: 2024-09-05

## 2024-09-05 RX ADMIN — Medication 100 MG: at 10:21

## 2024-09-05 RX ADMIN — AMLODIPINE BESYLATE 10 MG: 10 TABLET ORAL at 10:18

## 2024-09-05 RX ADMIN — CLOPIDOGREL BISULFATE 75 MG: 75 TABLET ORAL at 10:20

## 2024-09-05 RX ADMIN — ASPIRIN 81 MG 81 MG: 81 TABLET ORAL at 10:22

## 2024-09-05 RX ADMIN — FOLIC ACID 1 MG: 1 TABLET ORAL at 10:19

## 2024-09-05 ASSESSMENT — PAIN SCALES - GENERAL: PAINLEVEL_OUTOF10: 0

## 2024-09-05 NOTE — PROGRESS NOTES
Comprehensive Nutrition Assessment    Type and Reason for Visit:  Reassess    Nutrition Recommendations/Plan:   Recommend diet as per SLP.  Continue Magic Cups TID - RN reports pt. Consuming.  Consider MVI.  Monitor po, ONS intake vs. Need for appetite stimulant, additional nutrition interventions.     Malnutrition Assessment:  Malnutrition Status:  Moderate malnutrition (08/26/24 1337)    Context:  Chronic Illness     Findings of the 6 clinical characteristics of malnutrition:  Energy Intake:  75% or less estimated energy requirements for 1 month or longer  Weight Loss:  No significant weight loss     Body Fat Loss:  Mild body fat loss Orbital   Muscle Mass Loss:  Mild muscle mass loss Temples (temporalis), Clavicles (pectoralis & deltoids), Hand (interosseous)  Fluid Accumulation:  No significant fluid accumulation     Strength:  Not Performed    Nutrition Assessment:     Remains at risk for further nutritional compromise r/t moderate malnutrition, dysphagia, previous NPO status with NGT feedings (patient pulled out NGT 8/26/24-reinserted but then came out 8/28/24 during [facility]), encephalopathy, multiple infarcts, acute anemia, leukopenia, multiple uterine masses, and underlying medical condition (hx: CVA, AAA, alcohol use (4 bud lights-5 times per week).     Nutrition Related Findings:    Pt. Report/Treatments/Miscellaneous:   9/5- pt. Seen - awake, appears alert; does not answer questions; per RN - pt. Will say hello, etc but no meaningful conversations; RN states pt. Requires much encouragement w/ eating but is doing fairly well; states he makes sure pt. Consumes Magic Cups; plan ECF at discharge; SLP following, s/p MBS 9/4 8/30-Pt seen, appears limited, appears tearful,  here & he mentions pt ate all of her soup, peaches & magic cup at lunch. He reports she likes magic cups consumes ~ 2 per day. Pt denies chewing/swallowing difficulty on current diet. Pt has a telesitter.   8/29-Patient and spouse

## 2024-09-05 NOTE — PLAN OF CARE
1232: Pt departed the floor and was discharged via ambulance service in stable condition. Pt and spouse were educated on pts hospital course, discharge instructions, and medication changes - the spouse verbalized his understanding. Pts belongings were accounted for and sent with her. Iesha Turner Nurse () was contact and reported to- questions were answered.     COMPLETED CARE PLANS    Problem: Discharge Planning  Goal: Discharge to home or other facility with appropriate resources  9/5/2024 1025 by Adalberto Brantley RN  Outcome: Adequate for Discharge  9/5/2024 0851 by Adalberto Brantley RN  Outcome: Progressing  9/5/2024 0620 by Jacob Bueno RN  Outcome: Progressing     Problem: Chronic Conditions and Co-morbidities  Goal: Patient's chronic conditions and co-morbidity symptoms are monitored and maintained or improved  9/5/2024 1025 by Adalberto Brantley RN  Outcome: Adequate for Discharge  9/5/2024 0851 by Adalberto Brantley RN  Outcome: Progressing  9/5/2024 0620 by Jacob Bueno RN  Outcome: Progressing     Problem: Safety - Adult  Goal: Free from fall injury  9/5/2024 1025 by Adalberto Brantley RN  Outcome: Adequate for Discharge  9/5/2024 0851 by Adalberto Brantley RN  Outcome: Progressing  9/5/2024 0620 by Jacob Bueno RN  Outcome: Progressing     Problem: Pain  Goal: Verbalizes/displays adequate comfort level or baseline comfort level  9/5/2024 1025 by Adalberto Brantley RN  Outcome: Adequate for Discharge  9/5/2024 0851 by Adalberto Brantley RN  Outcome: Progressing  9/5/2024 0620 by Jacob Bueno RN  Outcome: Progressing     Problem: Skin/Tissue Integrity  Goal: Absence of new skin breakdown  Description: 1.  Monitor for areas of redness and/or skin breakdown  2.  Assess vascular access sites hourly  3.  Every 4-6 hours minimum:  Change oxygen saturation probe site  4.  Every 4-6 hours:  If on nasal continuous positive airway pressure, respiratory therapy assess nares and determine need for appliance change or resting  applying the restraint  2. Identify and document the criteria for restraint  3. Evaluate the patient's condition at the time of restraint application  4. Inform patient/family regarding the reason for restraint/seclusion  5. Q2H: Monitor comfort, nutrition and hydration needs  6. Q15M: Perform safety checks including skin, circulation, sensory, respiratory and psychological status  7. Ensure continuous observation  8. Identify and implement measures to help patient regain control, assess readiness for release and initiate progressive release per policy  9/5/2024 1025 by Adalberto Brantley, RN  Outcome: Adequate for Discharge  9/5/2024 0851 by Adalberto Brantley, RN  Outcome: Progressing  9/5/2024 0620 by Jacob Bueno, RN  Outcome: Progressing

## 2024-09-05 NOTE — PLAN OF CARE
Problem: Discharge Planning  Goal: Discharge to home or other facility with appropriate resources  9/5/2024 0851 by Adalberto Brantley RN  Outcome: Progressing  9/5/2024 0620 by Jacob Bueno RN  Outcome: Progressing     Problem: Chronic Conditions and Co-morbidities  Goal: Patient's chronic conditions and co-morbidity symptoms are monitored and maintained or improved  9/5/2024 0851 by Adalberto Brantley RN  Outcome: Progressing  9/5/2024 0620 by Jacob Bueno RN  Outcome: Progressing     Problem: Safety - Adult  Goal: Free from fall injury  9/5/2024 0851 by Adalberto Brantley RN  Outcome: Progressing  9/5/2024 0620 by Jacob Bueno RN  Outcome: Progressing     Problem: Pain  Goal: Verbalizes/displays adequate comfort level or baseline comfort level  9/5/2024 0851 by Adalberto Brantley RN  Outcome: Progressing  9/5/2024 0620 by Jacob Bueno RN  Outcome: Progressing     Problem: Skin/Tissue Integrity  Goal: Absence of new skin breakdown  Description: 1.  Monitor for areas of redness and/or skin breakdown  2.  Assess vascular access sites hourly  3.  Every 4-6 hours minimum:  Change oxygen saturation probe site  4.  Every 4-6 hours:  If on nasal continuous positive airway pressure, respiratory therapy assess nares and determine need for appliance change or resting period.  9/5/2024 0851 by Adalberto Brantley RN  Outcome: Progressing  9/5/2024 0620 by Jacob Bueno RN  Outcome: Progressing     Problem: Neurosensory - Adult  Goal: Achieves stable or improved neurological status  9/5/2024 0851 by Adalberto Brantley RN  Outcome: Progressing  9/5/2024 0620 by Jacob Bueno RN  Outcome: Progressing  Goal: Achieves maximal functionality and self care  9/5/2024 0851 by Adalberto Brantley RN  Outcome: Progressing  9/5/2024 0620 by Jacob Bueno RN  Outcome: Progressing     Problem: ABCDS Injury Assessment  Goal: Absence of physical injury  9/5/2024 0851 by Adalberto Brantley RN  Outcome: Progressing  9/5/2024 0620 by Jacob Bueno  Jacob Bueno, RN  Outcome: Progressing     Problem: Safety - Violent/Self-destructive Restraint  Goal: Remains free of injury from restraints (Restraint for Violent/Self-Destructive Behavior)  Description: INTERVENTIONS:  1. Determine that de-escalation and other, less restrictive measures have been tried or would not be effective before applying the restraint  2. Identify and document the criteria for restraint  3. Evaluate the patient's condition at the time of restraint application  4. Inform patient/family regarding the reason for restraint/seclusion  5. Q2H: Monitor comfort, nutrition and hydration needs  6. Q15M: Perform safety checks including skin, circulation, sensory, respiratory and psychological status  7. Ensure continuous observation  8. Identify and implement measures to help patient regain control, assess readiness for release and initiate progressive release per policy  9/5/2024 0851 by Adalberto Brantley, RN  Outcome: Progressing  9/5/2024 0620 by Jacob Bueno, RN  Outcome: Progressing

## 2024-09-05 NOTE — DISCHARGE INSTR - DIET

## 2024-09-05 NOTE — DISCHARGE SUMMARY
A linear   low-attenuation focus within the body of the spleen measures 8 x 18 mm (series   603, image 29) with differential considerations including infarct, old injury,   infiltrative process not excluded. Correlate with hematologic values.      3. The nonenhancing cystic structure in the right adnexa is slightly smaller   measuring 30 x 25 mm (previously measured up to 35 mm). The uterus remains   enlarged with a lobulated contour possibly indicating underlying uterine   fibroids. Pelvic ultrasound and contrast-enhanced pelvic MRI are more sensitive   modalities better suited for characterization of these findings.      4. Stable benign 27 mm left adrenal myelolipoma. Chronic findings are discussed.      **This report has been created using voice recognition software.  It may contain   minor errors which are inherent in voice recognition technology.**      Electronically signed by Dr. Madison Villa      XR ABDOMEN FOR NG/OG/NE TUBE PLACEMENT   Final Result   Tip of NG tube in the antrum of stomach.      This document has been electronically signed by: Monica Hall MD on 08/26/2024 09:35 PM      XR ABDOMEN FOR NG/OG/NE TUBE PLACEMENT   Final Result   Impression:   Nasogastric tube extends to the stomach.      This document has been electronically signed by: Sergio Ribeiro MD on    08/26/2024 06:46 PM      CT HEAD WO CONTRAST   Final Result      1. No hemorrhage.   2. Subtle low-attenuation in the subcortical white matter of the posterior right   frontal lobe at the area of recent infarction.   3. Stable appearing old infarcts in the left cerebral hemisphere.               **This report has been created using voice recognition software. It may contain   minor errors which are inherent in voice recognition technology.**      Electronically signed by Dr. Alyce Nelson      MRI BRAIN WO CONTRAST   Final Result      1. Acute infarcts in the posterior right frontal lobe and right corona radiata.   There  significant stenosis.      These findings were related to Jennifer Colon of the neurology service via   [employer] at the time of this dictation.         **This report has been created using voice recognition software. It may contain   minor errors which are inherent in voice recognition technology.**         Electronically signed by Dr. Alyce Nelson      CT HEAD WO CONTRAST   Final Result      1. Mild atrophy and encephalomalacia especially in the left occipital and left   posterior temporal lobes.   2. Otherwise negative noncontrast CT scan of the brain.   3. Results called to the stroke team at 12:35 p.m.      **This report has been created using voice recognition software. It may contain   minor errors which are inherent in voice recognition technology.**      Electronically signed by Dr. Ksenia Cardenas          Consults:   IP CONSULT TO NEUROLOGY  IP CONSULT TO HEM/ONC  IP CONSULT TO SOCIAL WORK  IP CONSULT TO CARDIOLOGY  IP CONSULT TO PHYSICAL MEDICINE REHAB  IP CONSULT TO REHAB/TCU ADMISSION COORDINATOR  IP CONSULT TO DIETITIAN  PALLIATIVE CARE EVAL  IP CONSULT TO PALLIATIVE CARE    Discharge Medications:      Medication List        CONTINUE taking these medications      Handicap Placard Misc  by Does not apply route Expires 12/17/23            ASK your doctor about these medications      Acetaminophen 650 MG Tabs  Take 650 mg by mouth every 4 hours as needed.     amLODIPine 10 MG tablet  Commonly known as: NORVASC  Take 1 tablet by mouth daily     aspirin 81 MG tablet  Take 1 tablet by mouth daily DO NOT TAKE UNTIL YOU ARE ADVISED TO RESTART BY NEUROSURGEONDR. PINO     clopidogrel 75 MG tablet  Commonly known as: PLAVIX  Take 1 tablet by mouth daily DO NOT TAKE UNTIL YOU ARE ADVISED TO RESTART BY NEUROSURGEONDR. PINO     CoQ10 100 MG Caps     hydroCHLOROthiazide 25 MG tablet  Commonly known as: HYDRODIURIL  Take 1 tablet by mouth daily               Patient Instructions:    Discharge lab work: CBC in

## 2024-09-05 NOTE — PROGRESS NOTES
[facility]  INPATIENT PHYSICAL THERAPY  DAILY NOTE  STRZ ONC MED 5K - 5K-07/007-A      Discharge Recommendations: Subacte/Skilled Nursing Facility  Equipment Recommendations:Other: monitor for needs      Time In: 0815  Time Out: 0854  Timed Code Treatment Minutes: 39 Minutes  Minutes: 39          Date: 2024  Patient Name: Mila Andrade,  Gender:  female        MRN: [id number]  : 1950  (74 y.o.)     Referring Practitioner: Geremias Thomas DO  Diagnosis: Confusion  Additional Pertinent Hx: Patient is a very pleasant 74-year-old female with medical history of prior stroke from home.  The patient has garbled speech is unable to contribute much to the history thus history is obtained from the patient's  is present at bedside.  He states that she was in her normal state of health approxi-9 PM return to bed last night.  He states this morning, she has been confused and in a \"daze\".  He states that she walked in the kitchen to make coffee, and then forgot everything that she was doing.  He states that she then went back to the kitchen made her coughing came back without the coffee.  As a attempt to talk to her, she has staring episodes and does not converse very much.     Upon my examination, the patient is mostly nonverbal.  She is able to answer few simple questions with garbled speech.  The  relates that the patient has been taking aspirin daily, but stopped taking all of her other medications including Lipitor approximate 6 months prior.     While in the ED, the patient was noted to have a hemoglobin of 7 down from approximate 11.6 approximately 1 year earlier.  Patient has never had a colonoscopy per the .  They have noticed no bleeding. - Per neurology note, \"Multiple infarcts involving the right frontal lobe right corona radiata right parietal lobe and punctate infarcts in both cerebellar hemisphere.\"     Prior Level of Function:  Lives With: Spouse  Type of Home:  Goal 1: Pt to be Mod I for supine <> sit to get in/out of bed  Short Term Goal 2: Pt to be Mod I for sit <> stand to get up to ambulate  Short Term Goal 3: Pt to ambulate > 15 ft with RW with Supervision for household distances  Short Term Goal 4: Pt to negotiate 4 steps with 1 rail with CGA for home access  Long Term Goals  Time Frame for Long Term Goals : not set due to short ELOS    Following session, patient left in safe position with all fall risk precautions in place.

## 2024-09-05 NOTE — PROGRESS NOTES
Temp-Not able to obtain, pt was confused with the oral temp so we tried axillary and couldn't get a read.  Pain:  Pt stated no pain  LOC:  Pt is alert and oriented to herself, where she is, and her birthday (also knows who her  is)  Speech:  Speech is slurred, slow, and appropriate  Pupils:  Round, even, reactive, dilated from 3-2 mm  Lips/Mucous Membranes:  Pink and moist, no lesions   Upper Extremities:  Pink, warm, and dry.  Turgor was >3 seconds  Cap refill was <3 seconds  Hand grasp was unequal  Pt had a strong grasp on right side and weaker on left  Pt also stated pain in left arm (it is bruised)  Arm drift was unable to be assessed as the pt was confused  Respiratory:  Unlabored respirations, on room air  Lung Sounds:  Clear   Bowl Sounds:  Active  Abdomen:  Flat  Non tender  Without masses  Skin:  Pink, intact  Lower Extremities:  Pink  Warm  No edema present  Sensation present  Pedal pulses weak  Pedal push and pull uneven, right side is stronger than left  ROM limited

## 2024-09-05 NOTE — PROGRESS NOTES
[facility]  STRZ ONC MED 5K  Occupational Therapy  Daily Note    Discharge Recommendations: Subacute/skilled nursing facility  Equipment Recommendations: Equipment Needed: No  Other: continue to monitor, will likely require some AD dependent on abilities       Time In: 1023  Time Out: 1051  Timed Code Treatment Minutes: 28 Minutes  Minutes: 28          Date: 2024  Patient Name: Mila Andrade,   Gender: female      Room: [facility]/007-  MRN: [id number]  : 1950  (74 y.o.)  Referring Practitioner: Geremias Thomas DO  Diagnosis: Confusion  Additional Pertinent Hx: Patient is a very pleasant 74-year-old female with medical history of prior stroke from home.  The patient has garbled speech is unable to contribute much to the history thus history is obtained from the patient's  is present at bedside.  He states that she was in her normal state of health approxi-9 PM return to bed last night.  He states this morning, she has been confused and in a \"daze\".  He states that she walked in the kitchen to make coffee, and then forgot everything that she was doing.  He states that she then went back to the kitchen made her coughing came back without the coffee.  As a attempt to talk to her, she has staring episodes and does not converse very much.     Upon my examination, the patient is mostly nonverbal.  She is able to answer few simple questions with garbled speech.  The  relates that the patient has been taking aspirin daily, but stopped taking all of her other medications including Lipitor approximate 6 months prior.     While in the ED, the patient was noted to have a hemoglobin of 7 down from approximate 11.6 approximately 1 year earlier.  Patient has never had a colonoscopy per the .  They have noticed no bleeding. - Per neurology note, \"Multiple infarcts involving the right frontal lobe right corona radiata right parietal lobe and punctate infarcts in both cerebellar

## 2024-09-05 NOTE — CARE COORDINATION
9/5/24, 11:44 AM EDT    Patient goals/plan/ treatment preferences discussed by  and .  Patient goals/plan/ treatment preferences reviewed with patient/ family.  Patient/ family verbalize understanding of discharge plan and are in agreement with goal/plan/treatment preferences.  Understanding was demonstrated using the teach back method.  AVS provided by RN at time of discharge, which includes all necessary medical information pertaining to the patients current course of illness, treatment, post-discharge goals of care, and treatment preferences.     Services At/After Discharge: Skilled Nursing Facility (SNF)    Voicemail left by [facility] that pre-cert had been approved.  Transport was set up for 12:30 pm.  Facility aware of transport time and able to accept patient.  Spoke with spouse and patient and they are aware of 1230 transport time and spouse aware that they maybe billed for transport if insurance does not cover it.  Faxed AVS and last dose MAR, no further concerns voiced.

## 2024-09-06 PROBLEM — I69.30 HISTORY OF CVA WITH RESIDUAL DEFICIT: Status: ACTIVE | Noted: 2024-09-06

## 2024-09-06 PROBLEM — Z86.79 HISTORY OF SUBARACHNOID HEMORRHAGE: Status: ACTIVE | Noted: 2024-09-06

## 2024-09-06 PROBLEM — R53.81 PHYSICAL DECONDITIONING: Status: ACTIVE | Noted: 2024-09-06

## 2024-09-06 PROBLEM — E78.5 DYSLIPIDEMIA: Status: ACTIVE | Noted: 2024-09-06

## 2024-09-06 PROBLEM — I69.391 DYSPHAGIA S/P CVA (CEREBROVASCULAR ACCIDENT): Status: ACTIVE | Noted: 2024-09-06

## 2024-09-06 PROBLEM — N85.2 BULKY OR ENLARGED UTERUS: Status: ACTIVE | Noted: 2024-09-06

## 2024-09-06 PROBLEM — K21.9 GASTROESOPHAGEAL REFLUX DISEASE: Status: ACTIVE | Noted: 2024-09-06

## 2024-09-19 PROBLEM — F10.920 ALCOHOLIC INTOXICATION WITHOUT COMPLICATION (HCC): Status: RESOLVED | Noted: 2023-07-22 | Resolved: 2024-09-19

## 2024-10-08 ENCOUNTER — OFFICE VISIT (OUTPATIENT)
Dept: FAMILY MEDICINE CLINIC | Age: 74
End: 2024-10-08
Payer: MEDICARE

## 2024-10-08 VITALS
WEIGHT: 118.4 LBS | HEIGHT: 60 IN | HEART RATE: 56 BPM | DIASTOLIC BLOOD PRESSURE: 64 MMHG | RESPIRATION RATE: 14 BRPM | BODY MASS INDEX: 23.25 KG/M2 | OXYGEN SATURATION: 97 % | SYSTOLIC BLOOD PRESSURE: 108 MMHG | TEMPERATURE: 97.8 F

## 2024-10-08 DIAGNOSIS — I71.21 ANEURYSM OF ASCENDING AORTA WITHOUT RUPTURE (HCC): ICD-10-CM

## 2024-10-08 DIAGNOSIS — N85.2 BULKY OR ENLARGED UTERUS: ICD-10-CM

## 2024-10-08 DIAGNOSIS — E53.8 B12 DEFICIENCY: ICD-10-CM

## 2024-10-08 DIAGNOSIS — I10 ESSENTIAL HYPERTENSION: Primary | ICD-10-CM

## 2024-10-08 DIAGNOSIS — I69.30 HISTORY OF CVA WITH RESIDUAL DEFICIT: ICD-10-CM

## 2024-10-08 DIAGNOSIS — E78.5 DYSLIPIDEMIA: ICD-10-CM

## 2024-10-08 DIAGNOSIS — D50.9 IRON DEFICIENCY ANEMIA, UNSPECIFIED IRON DEFICIENCY ANEMIA TYPE: ICD-10-CM

## 2024-10-08 PROBLEM — I63.513: Status: ACTIVE | Noted: 2024-09-05

## 2024-10-08 PROBLEM — I69.351 HEMIPLEGIA AND HEMIPARESIS FOLLOWING CEREBRAL INFARCTION AFFECTING RIGHT DOMINANT SIDE (HCC): Status: ACTIVE | Noted: 2024-09-05

## 2024-10-08 PROBLEM — I71.40 ABDOMINAL AORTIC ANEURYSM, WITHOUT RUPTURE, UNSPECIFIED (HCC): Status: ACTIVE | Noted: 2024-09-05

## 2024-10-08 PROBLEM — G93.49 OTHER ENCEPHALOPATHY: Status: ACTIVE | Noted: 2024-09-05

## 2024-10-08 PROBLEM — I69.912: Status: ACTIVE | Noted: 2024-09-05

## 2024-10-08 PROCEDURE — 1123F ACP DISCUSS/DSCN MKR DOCD: CPT | Performed by: NURSE PRACTITIONER

## 2024-10-08 PROCEDURE — 3078F DIAST BP <80 MM HG: CPT | Performed by: NURSE PRACTITIONER

## 2024-10-08 PROCEDURE — 99214 OFFICE O/P EST MOD 30 MIN: CPT | Performed by: NURSE PRACTITIONER

## 2024-10-08 PROCEDURE — 3074F SYST BP LT 130 MM HG: CPT | Performed by: NURSE PRACTITIONER

## 2024-10-08 RX ORDER — CLOPIDOGREL BISULFATE 75 MG/1
75 TABLET ORAL DAILY
Qty: 90 TABLET | Refills: 3 | Status: SHIPPED | OUTPATIENT
Start: 2024-10-08

## 2024-10-08 RX ORDER — PANTOPRAZOLE SODIUM 40 MG/1
40 TABLET, DELAYED RELEASE ORAL DAILY
Qty: 90 TABLET | Refills: 3 | Status: SHIPPED | OUTPATIENT
Start: 2024-10-08

## 2024-10-08 RX ORDER — AMLODIPINE BESYLATE 10 MG/1
10 TABLET ORAL DAILY
Qty: 90 TABLET | Refills: 3 | Status: SHIPPED | OUTPATIENT
Start: 2024-10-08 | End: 2025-10-03

## 2024-10-08 RX ORDER — LANOLIN ALCOHOL/MO/W.PET/CERES
100 CREAM (GRAM) TOPICAL DAILY
Qty: 90 TABLET | Refills: 3 | Status: SHIPPED | OUTPATIENT
Start: 2024-10-08

## 2024-10-08 RX ORDER — HYDROCHLOROTHIAZIDE 25 MG/1
25 TABLET ORAL DAILY
Qty: 90 TABLET | Refills: 3 | Status: SHIPPED | OUTPATIENT
Start: 2024-10-08

## 2024-10-08 ASSESSMENT — PATIENT HEALTH QUESTIONNAIRE - PHQ9
SUM OF ALL RESPONSES TO PHQ QUESTIONS 1-9: 0
SUM OF ALL RESPONSES TO PHQ9 QUESTIONS 1 & 2: 0
SUM OF ALL RESPONSES TO PHQ QUESTIONS 1-9: 0
SUM OF ALL RESPONSES TO PHQ QUESTIONS 1-9: 0
1. LITTLE INTEREST OR PLEASURE IN DOING THINGS: NOT AT ALL
SUM OF ALL RESPONSES TO PHQ QUESTIONS 1-9: 0
2. FEELING DOWN, DEPRESSED OR HOPELESS: NOT AT ALL

## 2024-10-22 ENCOUNTER — OFFICE VISIT (OUTPATIENT)
Dept: CARDIOLOGY CLINIC | Age: 74
End: 2024-10-22

## 2024-10-22 VITALS
DIASTOLIC BLOOD PRESSURE: 77 MMHG | HEIGHT: 62 IN | BODY MASS INDEX: 21.71 KG/M2 | HEART RATE: 78 BPM | SYSTOLIC BLOOD PRESSURE: 117 MMHG | WEIGHT: 118 LBS

## 2024-10-22 DIAGNOSIS — I35.0 NONRHEUMATIC AORTIC VALVE STENOSIS: ICD-10-CM

## 2024-10-22 DIAGNOSIS — R94.31 ABNORMAL ELECTROCARDIOGRAM (ECG) (EKG): ICD-10-CM

## 2024-10-22 DIAGNOSIS — I35.1 NONRHEUMATIC AORTIC VALVE INSUFFICIENCY: ICD-10-CM

## 2024-10-22 DIAGNOSIS — I71.21 ANEURYSM OF ASCENDING AORTA WITHOUT RUPTURE (HCC): Primary | ICD-10-CM

## 2024-10-22 DIAGNOSIS — E78.5 HYPERLIPIDEMIA, UNSPECIFIED HYPERLIPIDEMIA TYPE: ICD-10-CM

## 2024-10-22 DIAGNOSIS — I63.9 CEREBROVASCULAR ACCIDENT (CVA), UNSPECIFIED MECHANISM (HCC): ICD-10-CM

## 2024-10-22 DIAGNOSIS — I10 PRIMARY HYPERTENSION: ICD-10-CM

## 2024-10-22 NOTE — PROGRESS NOTES
[facility] [place] [facility]  [facility] CARDIOLOGY  [address]  [address]  [facility] [address]  Dept: [phone]  Dept Fax: [phone]  Loc: [phone]         CHIEF COMPLAINT:  Hospital follow up for CVA  Ascending Aortic aneurysm  AS  AI  HTN  Abnormal ECG    HISTORY OF PRESENT ILLNESS:      The patient is a 74 y.o. white female who is being seen in hospital follow-up after a stroke and for cardiac care.  The patient denies any history of CAD, MI, CHF or angina.  She has a history of an ascending aortic aneurysm.  The patient also has a history of several strokes over the past several years.  The patient had an echo in the hospital which showed normal LV size and function with EF of 55 to 60%.  There was mild aortic stenosis along with moderate aortic insufficiency and a dilated ascending aorta at 4.3 cm.  She also had a JODI done by Dr. Finley.    The patient denies any chest pain, pressure, squeezing, tightness or discomfort with exertion.  She denies any significant shortness of breath or dyspnea on exertion.  She denies any palpitations, heart racing, skipping or pounding.  She denies any significant shortness of breath or dyspnea on exertion.  She denies any palpitations, heart racing, skipping or pounding.  She denies any dizziness or lightheadedness.  She denies any syncope     The patient has a history of hypertension.  No diabetes.  She has dyslipidemia and is being treated with a statin.  She has never been a smoker.  There is a positive family history of some heart disease mostly on her mother side of the family.  In addition to several regular strokes she also had a subarachnoid hemorrhage several years ago.    Past Medical History:  Past Medical History:   Diagnosis Date    Ascending aortic aneurysm (HCC)     Dyslipidemia     GERD (gastroesophageal reflux disease)     History of CVA with residual deficit     Multiple strokes    History of subarachnoid hemorrhage

## 2024-10-31 ENCOUNTER — HOSPITAL ENCOUNTER (OUTPATIENT)
Age: 74
Discharge: HOME | End: 2024-11-02
Attending: INTERNAL MEDICINE
Payer: MEDICARE

## 2024-10-31 DIAGNOSIS — R94.31 ABNORMAL ELECTROCARDIOGRAM (ECG) (EKG): ICD-10-CM

## 2024-10-31 DIAGNOSIS — I63.9 CEREBROVASCULAR ACCIDENT (CVA), UNSPECIFIED MECHANISM (HCC): ICD-10-CM

## 2024-10-31 PROCEDURE — 93225 XTRNL ECG REC<48 HRS REC: CPT

## 2024-11-07 LAB
ACQUISITION DURATION: NORMAL S
AVERAGE HEART RATE: 60 BPM
HOOKUP DATE: NORMAL
HOOKUP TIME: NORMAL
MAX HEART RATE TIME/DATE: NORMAL
MAX HEART RATE: 123 BPM
MIN HEART RATE TIME/DATE: NORMAL
MIN HEART RATE: 43 BPM
NUMBER OF QRS COMPLEXES: NORMAL
NUMBER OF SUPRAVENTRICULAR COUPLETS: 14
NUMBER OF SUPRAVENTRICULAR ECTOPICS: 502
NUMBER OF SUPRAVENTRICULAR ISOLATED BEATS: 431
NUMBER OF VENTRICULAR BIGEMINAL CYCLES: 0
NUMBER OF VENTRICULAR COUPLETS: 0
NUMBER OF VENTRICULAR ECTOPICS: 0

## 2024-12-10 ENCOUNTER — OFFICE VISIT (OUTPATIENT)
Dept: NEUROLOGY | Age: 74
End: 2024-12-10
Payer: MEDICARE

## 2024-12-10 VITALS
HEIGHT: 62 IN | SYSTOLIC BLOOD PRESSURE: 125 MMHG | DIASTOLIC BLOOD PRESSURE: 85 MMHG | HEART RATE: 84 BPM | OXYGEN SATURATION: 99 % | BODY MASS INDEX: 19.32 KG/M2 | WEIGHT: 105 LBS

## 2024-12-10 DIAGNOSIS — Z86.79 HISTORY OF INTRACRANIAL HEMORRHAGE: Chronic | ICD-10-CM

## 2024-12-10 DIAGNOSIS — I69.30 HISTORY OF STROKE WITH RESIDUAL DEFICIT: Primary | ICD-10-CM

## 2024-12-10 DIAGNOSIS — H53.40 VISUAL FIELD CUT: ICD-10-CM

## 2024-12-10 DIAGNOSIS — R47.01 APHASIA: ICD-10-CM

## 2024-12-10 DIAGNOSIS — R48.2 APRAXIA: ICD-10-CM

## 2024-12-10 PROCEDURE — 3074F SYST BP LT 130 MM HG: CPT | Performed by: PSYCHIATRY & NEUROLOGY

## 2024-12-10 PROCEDURE — 3079F DIAST BP 80-89 MM HG: CPT | Performed by: PSYCHIATRY & NEUROLOGY

## 2024-12-10 PROCEDURE — 99205 OFFICE O/P NEW HI 60 MIN: CPT | Performed by: PSYCHIATRY & NEUROLOGY

## 2024-12-10 PROCEDURE — 1159F MED LIST DOCD IN RCRD: CPT | Performed by: PSYCHIATRY & NEUROLOGY

## 2024-12-10 PROCEDURE — 1123F ACP DISCUSS/DSCN MKR DOCD: CPT | Performed by: PSYCHIATRY & NEUROLOGY

## 2024-12-10 NOTE — PATIENT INSTRUCTIONS
Continue with dual antiplatelets  Continue with lipid lowering medications, target LDL below 70  Homocystine level  Sed rate   CRP  Referral to cardiology, Dr. Chapa, (established cardiologist) for ILR placement.   Modify risk factors for stroke (blood pressure, cholesterol, diabetes, smoking cessation etc.. Control)  Call with any new symptoms or concerns.   Follow up as needed

## 2024-12-20 PROBLEM — H53.40 VISUAL FIELD CUT: Status: ACTIVE | Noted: 2024-12-20

## 2024-12-20 PROBLEM — R48.2 APRAXIA: Status: ACTIVE | Noted: 2024-12-20

## 2024-12-21 NOTE — PROGRESS NOTES
Chief Complaint   Patient presents with    Consultation/Stroke    Mila Andrade is a 74 y.o. female who presents today for evaluation of history of multiple strokes. Most recent stroke event was in August 2024. Initial presenting symptoms was confusion, speech difficulty, MRI brain WO contrast done 8/23/24 showed Acute infarcts in the posterior right frontal lobe and right corona radiata. There are also acute infarcts in the deep white matter of the right parietal lobe and punctate infarcts in both cerebellar hemispheres. Old infarcts on the left in the corona radiata, left frontal lobe, left parietal lobe, left temporal and left occipital lobes.Mild severity chronic small vessel ischemic changes. CTA head and neck W/WO contrast done 8/23/24 showed New absent M2 branch of the right middle cerebral artery. Stable mild stenosis of the right M1 segment.  Stable occluded left middle cerebral artery.  Stable occluded left P1 segment. Mild worsening of the atherosclerosis of the left carotid bulb. This does not contribute to a hemodynamically significant stenosis. She does not smoke, she is not diabetic. She is on aspirin and plavix in addition to lipid lowering medication. JODI done 8/28/24 Normal left ventricular systolic function with a visually estimated EF of 50 - 55%. Left ventricle size is normal. . She has history of traumatic SAH in 2023 after falling off bar stool. She   denies chest pain. No shortness of breath, no neck pain. No vision changes. No dysphagia. No fever. No rash. No weight loss. Patient is a poor historian, history obtained from patient's  and review of medical record.        Past Medical History:   Diagnosis Date    Ascending aortic aneurysm (HCC)     Dyslipidemia     GERD (gastroesophageal reflux disease)     History of CVA with residual deficit     Multiple strokes    History of subarachnoid hemorrhage     Hypertension, essential        Patient Active Problem List   Diagnosis

## 2025-02-23 ENCOUNTER — TELEPHONE (OUTPATIENT)
Dept: FAMILY MEDICINE CLINIC | Age: 75
End: 2025-02-23

## 2025-02-23 DIAGNOSIS — I71.21 ANEURYSM OF ASCENDING AORTA WITHOUT RUPTURE: Primary | ICD-10-CM

## 2025-02-24 NOTE — TELEPHONE ENCOUNTER
Left message on answering machine. Requested pt to call back at [phone], at their earliest convenience.

## 2025-02-25 NOTE — TELEPHONE ENCOUNTER
Left message on answering machine. Requested pt to call back at [phone], at their earliest convenience.

## 2025-02-26 NOTE — TELEPHONE ENCOUNTER
Left message on answering machine. Requested pt to call back at [phone], at their earliest convenience.       3rd attempt, letter sent to pt.

## 2025-04-10 ENCOUNTER — TELEPHONE (OUTPATIENT)
Dept: FAMILY MEDICINE CLINIC | Age: 75
End: 2025-04-10

## (undated) DEVICE — GLOVE ORTHO 8   MSG9480